# Patient Record
Sex: FEMALE | Race: WHITE | Employment: PART TIME | URBAN - METROPOLITAN AREA
[De-identification: names, ages, dates, MRNs, and addresses within clinical notes are randomized per-mention and may not be internally consistent; named-entity substitution may affect disease eponyms.]

---

## 2017-01-03 ENCOUNTER — HOSPITAL ENCOUNTER (OUTPATIENT)
Dept: OTHER | Age: 27
Discharge: HOME | End: 2017-01-03
Attending: NURSE PRACTITIONER | Admitting: NURSE PRACTITIONER

## 2017-01-03 ENCOUNTER — OFFICE VISIT (OUTPATIENT)
Dept: FAMILY MEDICINE CLINIC | Age: 27
End: 2017-01-03

## 2017-01-03 ENCOUNTER — TELEPHONE (OUTPATIENT)
Dept: CARDIOLOGY CLINIC | Age: 27
End: 2017-01-03

## 2017-01-03 VITALS
SYSTOLIC BLOOD PRESSURE: 132 MMHG | TEMPERATURE: 98.1 F | DIASTOLIC BLOOD PRESSURE: 94 MMHG | HEART RATE: 110 BPM | BODY MASS INDEX: 30.79 KG/M2 | WEIGHT: 173.8 LBS | HEIGHT: 63 IN | OXYGEN SATURATION: 97 % | RESPIRATION RATE: 14 BRPM

## 2017-01-03 DIAGNOSIS — F32.A DEPRESSION, UNSPECIFIED DEPRESSION TYPE: ICD-10-CM

## 2017-01-03 DIAGNOSIS — R07.9 CHEST PAIN, UNSPECIFIED TYPE: ICD-10-CM

## 2017-01-03 DIAGNOSIS — R00.0 TACHYCARDIA: ICD-10-CM

## 2017-01-03 DIAGNOSIS — M54.42 ACUTE MIDLINE LOW BACK PAIN WITH LEFT-SIDED SCIATICA: ICD-10-CM

## 2017-01-03 DIAGNOSIS — M54.42 ACUTE MIDLINE LOW BACK PAIN WITH LEFT-SIDED SCIATICA: Primary | ICD-10-CM

## 2017-01-03 DIAGNOSIS — R51.9 ACUTE NONINTRACTABLE HEADACHE, UNSPECIFIED HEADACHE TYPE: ICD-10-CM

## 2017-01-03 PROCEDURE — 93000 ELECTROCARDIOGRAM COMPLETE: CPT | Performed by: NURSE PRACTITIONER

## 2017-01-03 PROCEDURE — 99213 OFFICE O/P EST LOW 20 MIN: CPT | Performed by: NURSE PRACTITIONER

## 2017-01-03 RX ORDER — BUPROPION HYDROCHLORIDE 100 MG/1
100 TABLET ORAL 2 TIMES DAILY
Qty: 60 TABLET | Refills: 3 | Status: SHIPPED | OUTPATIENT
Start: 2017-01-03 | End: 2017-04-04 | Stop reason: ALTCHOICE

## 2017-01-03 RX ORDER — METHOCARBAMOL 500 MG/1
500 TABLET, FILM COATED ORAL 4 TIMES DAILY
Qty: 40 TABLET | Refills: 0 | Status: SHIPPED | OUTPATIENT
Start: 2017-01-03 | End: 2017-01-13

## 2017-01-03 ASSESSMENT — ENCOUNTER SYMPTOMS
GASTROINTESTINAL NEGATIVE: 1
EYES NEGATIVE: 1
BACK PAIN: 1

## 2017-01-04 ENCOUNTER — TELEPHONE (OUTPATIENT)
Dept: FAMILY MEDICINE CLINIC | Age: 27
End: 2017-01-04

## 2017-01-04 DIAGNOSIS — M54.42 ACUTE BILATERAL LOW BACK PAIN WITH BILATERAL SCIATICA: Primary | ICD-10-CM

## 2017-01-04 DIAGNOSIS — M54.41 ACUTE BILATERAL LOW BACK PAIN WITH BILATERAL SCIATICA: Primary | ICD-10-CM

## 2017-01-04 DIAGNOSIS — M54.5 ACUTE MIDLINE LOW BACK PAIN, WITH SCIATICA PRESENCE UNSPECIFIED: Primary | ICD-10-CM

## 2017-01-04 RX ORDER — HYDROCODONE BITARTRATE AND ACETAMINOPHEN 5; 325 MG/1; MG/1
1 TABLET ORAL EVERY 8 HOURS PRN
Qty: 15 TABLET | Refills: 0 | Status: SHIPPED | OUTPATIENT
Start: 2017-01-04 | End: 2017-01-11

## 2017-01-11 ENCOUNTER — OFFICE VISIT (OUTPATIENT)
Dept: CARDIOLOGY CLINIC | Age: 27
End: 2017-01-11

## 2017-01-11 VITALS
OXYGEN SATURATION: 98 % | HEIGHT: 63 IN | WEIGHT: 177 LBS | BODY MASS INDEX: 31.36 KG/M2 | HEART RATE: 90 BPM | SYSTOLIC BLOOD PRESSURE: 131 MMHG | DIASTOLIC BLOOD PRESSURE: 82 MMHG

## 2017-01-11 DIAGNOSIS — R55 SYNCOPE AND COLLAPSE: ICD-10-CM

## 2017-01-11 DIAGNOSIS — R00.0 SINUS TACHYCARDIA: Primary | ICD-10-CM

## 2017-01-11 PROCEDURE — 99214 OFFICE O/P EST MOD 30 MIN: CPT | Performed by: NURSE PRACTITIONER

## 2017-01-11 RX ORDER — PROPRANOLOL HYDROCHLORIDE 20 MG/1
20 TABLET ORAL 2 TIMES DAILY
Qty: 60 TABLET | Refills: 3 | Status: SHIPPED | OUTPATIENT
Start: 2017-01-11 | End: 2017-06-28 | Stop reason: SDUPTHER

## 2017-01-24 ENCOUNTER — TELEPHONE (OUTPATIENT)
Dept: CARDIOLOGY CLINIC | Age: 27
End: 2017-01-24

## 2017-01-25 DIAGNOSIS — R45.4 IRRITABILITY: ICD-10-CM

## 2017-01-25 DIAGNOSIS — F32.89 OTHER DEPRESSION: ICD-10-CM

## 2017-01-25 RX ORDER — ESCITALOPRAM OXALATE 10 MG/1
TABLET ORAL
Qty: 30 TABLET | Refills: 5 | Status: SHIPPED | OUTPATIENT
Start: 2017-01-25 | End: 2017-02-07 | Stop reason: ALTCHOICE

## 2017-02-07 ENCOUNTER — TELEPHONE (OUTPATIENT)
Dept: CARDIOLOGY CLINIC | Age: 27
End: 2017-02-07

## 2017-02-07 ENCOUNTER — OFFICE VISIT (OUTPATIENT)
Dept: CARDIOLOGY CLINIC | Age: 27
End: 2017-02-07

## 2017-02-07 VITALS
SYSTOLIC BLOOD PRESSURE: 102 MMHG | DIASTOLIC BLOOD PRESSURE: 66 MMHG | HEART RATE: 72 BPM | WEIGHT: 176 LBS | HEIGHT: 63 IN | BODY MASS INDEX: 31.18 KG/M2

## 2017-02-07 DIAGNOSIS — R00.2 PALPITATIONS: ICD-10-CM

## 2017-02-07 DIAGNOSIS — R55 SYNCOPE AND COLLAPSE: Primary | ICD-10-CM

## 2017-02-07 DIAGNOSIS — R00.0 SINUS TACHYCARDIA: ICD-10-CM

## 2017-02-07 PROCEDURE — 93000 ELECTROCARDIOGRAM COMPLETE: CPT | Performed by: NURSE PRACTITIONER

## 2017-02-07 PROCEDURE — 99214 OFFICE O/P EST MOD 30 MIN: CPT | Performed by: NURSE PRACTITIONER

## 2017-02-08 ENCOUNTER — OFFICE VISIT (OUTPATIENT)
Dept: FAMILY MEDICINE CLINIC | Age: 27
End: 2017-02-08

## 2017-02-08 VITALS
DIASTOLIC BLOOD PRESSURE: 68 MMHG | TEMPERATURE: 98.3 F | HEIGHT: 63 IN | RESPIRATION RATE: 16 BRPM | SYSTOLIC BLOOD PRESSURE: 112 MMHG | OXYGEN SATURATION: 98 % | WEIGHT: 179.2 LBS | HEART RATE: 98 BPM | BODY MASS INDEX: 31.75 KG/M2

## 2017-02-08 DIAGNOSIS — R73.09 BLOOD GLUCOSE LOWER THAN PRIOR MEASUREMENT: ICD-10-CM

## 2017-02-08 DIAGNOSIS — R55 NEAR SYNCOPE: Primary | ICD-10-CM

## 2017-02-08 LAB — GLUCOSE BLD-MCNC: 113 MG/DL

## 2017-02-08 PROCEDURE — 82962 GLUCOSE BLOOD TEST: CPT | Performed by: NURSE PRACTITIONER

## 2017-02-08 PROCEDURE — 99213 OFFICE O/P EST LOW 20 MIN: CPT | Performed by: NURSE PRACTITIONER

## 2017-02-08 RX ORDER — LANCETS 30 GAUGE
EACH MISCELLANEOUS
Qty: 100 EACH | Refills: 3 | Status: SHIPPED | OUTPATIENT
Start: 2017-02-08 | End: 2017-05-08

## 2017-02-08 RX ORDER — GLUCOSAMINE HCL/CHONDROITIN SU 500-400 MG
CAPSULE ORAL
Qty: 100 STRIP | Refills: 0 | Status: SHIPPED | OUTPATIENT
Start: 2017-02-08 | End: 2017-05-08

## 2017-02-08 ASSESSMENT — ENCOUNTER SYMPTOMS
EYES NEGATIVE: 1
RESPIRATORY NEGATIVE: 1
ABDOMINAL PAIN: 0
VISUAL CHANGE: 0
NAUSEA: 1

## 2017-02-10 ENCOUNTER — TELEPHONE (OUTPATIENT)
Dept: FAMILY MEDICINE CLINIC | Age: 27
End: 2017-02-10

## 2017-03-08 ENCOUNTER — OFFICE VISIT (OUTPATIENT)
Dept: FAMILY MEDICINE CLINIC | Age: 27
End: 2017-03-08

## 2017-03-08 VITALS
BODY MASS INDEX: 31.36 KG/M2 | OXYGEN SATURATION: 97 % | SYSTOLIC BLOOD PRESSURE: 128 MMHG | HEIGHT: 63 IN | TEMPERATURE: 98.1 F | HEART RATE: 80 BPM | DIASTOLIC BLOOD PRESSURE: 78 MMHG | WEIGHT: 177 LBS

## 2017-03-08 DIAGNOSIS — R10.33 PERIUMBILICAL ABDOMINAL PAIN: ICD-10-CM

## 2017-03-08 DIAGNOSIS — Z98.890 S/P ENDOMETRIAL ABLATION: Primary | ICD-10-CM

## 2017-03-08 PROCEDURE — 99213 OFFICE O/P EST LOW 20 MIN: CPT | Performed by: NURSE PRACTITIONER

## 2017-03-08 ASSESSMENT — ENCOUNTER SYMPTOMS
VOMITING: 0
EYES NEGATIVE: 1
DIARRHEA: 0
RESPIRATORY NEGATIVE: 1
CONSTIPATION: 0
NAUSEA: 0
ABDOMINAL PAIN: 1

## 2017-04-04 ENCOUNTER — OFFICE VISIT (OUTPATIENT)
Dept: FAMILY MEDICINE CLINIC | Age: 27
End: 2017-04-04

## 2017-04-04 VITALS
OXYGEN SATURATION: 97 % | DIASTOLIC BLOOD PRESSURE: 76 MMHG | SYSTOLIC BLOOD PRESSURE: 104 MMHG | BODY MASS INDEX: 31.18 KG/M2 | HEIGHT: 63 IN | WEIGHT: 176 LBS | HEART RATE: 113 BPM

## 2017-04-04 DIAGNOSIS — M25.562 ACUTE PAIN OF LEFT KNEE: Primary | ICD-10-CM

## 2017-04-04 DIAGNOSIS — R00.0 TACHYCARDIA: ICD-10-CM

## 2017-04-04 DIAGNOSIS — F32.89 OTHER DEPRESSION: ICD-10-CM

## 2017-04-04 PROCEDURE — 99214 OFFICE O/P EST MOD 30 MIN: CPT | Performed by: NURSE PRACTITIONER

## 2017-04-04 RX ORDER — BUPROPION HYDROCHLORIDE 150 MG/1
150 TABLET, EXTENDED RELEASE ORAL 2 TIMES DAILY
Qty: 60 TABLET | Refills: 3 | Status: SHIPPED | OUTPATIENT
Start: 2017-04-04 | End: 2017-12-11 | Stop reason: SDUPTHER

## 2017-04-04 ASSESSMENT — ENCOUNTER SYMPTOMS
DIARRHEA: 0
CONSTIPATION: 0
VOMITING: 0
WHEEZING: 0
BLURRED VISION: 0
NAUSEA: 0
COUGH: 0

## 2017-04-27 ENCOUNTER — TELEPHONE (OUTPATIENT)
Dept: CARDIOLOGY CLINIC | Age: 27
End: 2017-04-27

## 2017-04-28 ENCOUNTER — OFFICE VISIT (OUTPATIENT)
Dept: CARDIOLOGY CLINIC | Age: 27
End: 2017-04-28

## 2017-04-28 VITALS
WEIGHT: 176 LBS | BODY MASS INDEX: 31.18 KG/M2 | HEIGHT: 63 IN | DIASTOLIC BLOOD PRESSURE: 94 MMHG | HEART RATE: 79 BPM | SYSTOLIC BLOOD PRESSURE: 120 MMHG

## 2017-04-28 DIAGNOSIS — R00.2 PALPITATIONS: Primary | ICD-10-CM

## 2017-04-28 DIAGNOSIS — R41.3 MEMORY LOSS: ICD-10-CM

## 2017-04-28 DIAGNOSIS — R55 SYNCOPE AND COLLAPSE: ICD-10-CM

## 2017-04-28 PROCEDURE — 93000 ELECTROCARDIOGRAM COMPLETE: CPT | Performed by: INTERNAL MEDICINE

## 2017-04-28 PROCEDURE — 99214 OFFICE O/P EST MOD 30 MIN: CPT | Performed by: INTERNAL MEDICINE

## 2017-05-01 ENCOUNTER — TELEPHONE (OUTPATIENT)
Dept: CARDIOLOGY CLINIC | Age: 27
End: 2017-05-01

## 2017-05-02 ENCOUNTER — OFFICE VISIT (OUTPATIENT)
Dept: FAMILY MEDICINE CLINIC | Age: 27
End: 2017-05-02

## 2017-05-02 VITALS
SYSTOLIC BLOOD PRESSURE: 110 MMHG | WEIGHT: 176.6 LBS | DIASTOLIC BLOOD PRESSURE: 64 MMHG | HEART RATE: 102 BPM | HEIGHT: 63 IN | OXYGEN SATURATION: 98 % | BODY MASS INDEX: 31.29 KG/M2

## 2017-05-02 DIAGNOSIS — R00.2 PALPITATIONS: ICD-10-CM

## 2017-05-02 DIAGNOSIS — G89.29 CHRONIC PAIN OF BOTH KNEES: ICD-10-CM

## 2017-05-02 DIAGNOSIS — M25.562 CHRONIC PAIN OF BOTH KNEES: ICD-10-CM

## 2017-05-02 DIAGNOSIS — M25.561 CHRONIC PAIN OF BOTH KNEES: ICD-10-CM

## 2017-05-02 DIAGNOSIS — R55 SYNCOPE, UNSPECIFIED SYNCOPE TYPE: Primary | ICD-10-CM

## 2017-05-02 PROCEDURE — 99214 OFFICE O/P EST MOD 30 MIN: CPT | Performed by: NURSE PRACTITIONER

## 2017-05-02 RX ORDER — HYDROCODONE BITARTRATE AND ACETAMINOPHEN 5; 325 MG/1; MG/1
1 TABLET ORAL EVERY 8 HOURS PRN
Qty: 21 TABLET | Refills: 0 | Status: SHIPPED | OUTPATIENT
Start: 2017-05-02 | End: 2017-05-08

## 2017-05-02 ASSESSMENT — ENCOUNTER SYMPTOMS
WHEEZING: 0
CONSTIPATION: 0
DIARRHEA: 0
NAUSEA: 0
BLURRED VISION: 0
COUGH: 0
VOMITING: 0
BOWEL INCONTINENCE: 0

## 2017-05-08 ENCOUNTER — INITIAL CONSULT (OUTPATIENT)
Dept: NEUROLOGY | Age: 27
End: 2017-05-08

## 2017-05-08 VITALS
DIASTOLIC BLOOD PRESSURE: 73 MMHG | HEIGHT: 63 IN | SYSTOLIC BLOOD PRESSURE: 118 MMHG | WEIGHT: 177 LBS | BODY MASS INDEX: 31.36 KG/M2 | HEART RATE: 94 BPM | OXYGEN SATURATION: 95 %

## 2017-05-08 DIAGNOSIS — R55 SYNCOPE AND COLLAPSE: Primary | ICD-10-CM

## 2017-05-08 DIAGNOSIS — R56.9 NEW ONSET SEIZURE (HCC): ICD-10-CM

## 2017-05-08 DIAGNOSIS — R41.3 MEMORY LOSS: ICD-10-CM

## 2017-05-08 DIAGNOSIS — F51.01 PRIMARY INSOMNIA: ICD-10-CM

## 2017-05-08 PROCEDURE — 99204 OFFICE O/P NEW MOD 45 MIN: CPT | Performed by: PSYCHIATRY & NEUROLOGY

## 2017-05-09 ENCOUNTER — OFFICE VISIT (OUTPATIENT)
Dept: ORTHOPEDIC SURGERY | Age: 27
End: 2017-05-09

## 2017-05-09 VITALS — BODY MASS INDEX: 31.37 KG/M2 | HEIGHT: 63 IN | WEIGHT: 177.03 LBS

## 2017-05-09 DIAGNOSIS — M22.2X1 PATELLOFEMORAL STRESS SYNDROME OF BOTH KNEES: Primary | ICD-10-CM

## 2017-05-09 DIAGNOSIS — M25.562 LEFT KNEE PAIN, UNSPECIFIED CHRONICITY: ICD-10-CM

## 2017-05-09 DIAGNOSIS — M25.561 RIGHT KNEE PAIN, UNSPECIFIED CHRONICITY: ICD-10-CM

## 2017-05-09 DIAGNOSIS — M22.2X2 PATELLOFEMORAL STRESS SYNDROME OF BOTH KNEES: Primary | ICD-10-CM

## 2017-05-09 PROCEDURE — 99203 OFFICE O/P NEW LOW 30 MIN: CPT | Performed by: ORTHOPAEDIC SURGERY

## 2017-05-09 PROCEDURE — 73564 X-RAY EXAM KNEE 4 OR MORE: CPT | Performed by: ORTHOPAEDIC SURGERY

## 2017-05-11 ENCOUNTER — HOSPITAL ENCOUNTER (OUTPATIENT)
Dept: MRI IMAGING | Age: 27
Discharge: HOME | End: 2017-05-11
Attending: PSYCHIATRY & NEUROLOGY | Admitting: PSYCHIATRY & NEUROLOGY

## 2017-05-11 DIAGNOSIS — R55 SYNCOPE AND COLLAPSE: ICD-10-CM

## 2017-05-11 PROCEDURE — 95816 EEG AWAKE AND DROWSY: CPT | Performed by: PSYCHIATRY & NEUROLOGY

## 2017-05-15 ENCOUNTER — TELEPHONE (OUTPATIENT)
Dept: NEUROLOGY | Age: 27
End: 2017-05-15

## 2017-05-24 DIAGNOSIS — Z45.09 ENCOUNTER FOR LOOP RECORDER CHECK: ICD-10-CM

## 2017-05-24 DIAGNOSIS — Z45.09 ENCOUNTER FOR ELECTRONIC ANALYSIS OF REVEAL EVENT RECORDER: Primary | ICD-10-CM

## 2017-05-30 ENCOUNTER — PROCEDURE VISIT (OUTPATIENT)
Dept: CARDIOLOGY CLINIC | Age: 27
End: 2017-05-30

## 2017-05-30 DIAGNOSIS — Z45.09 ENCOUNTER FOR ELECTRONIC ANALYSIS OF REVEAL EVENT RECORDER: Primary | ICD-10-CM

## 2017-06-12 ENCOUNTER — PROCEDURE VISIT (OUTPATIENT)
Dept: CARDIOLOGY CLINIC | Age: 27
End: 2017-06-12

## 2017-06-12 DIAGNOSIS — Z45.09 ENCOUNTER FOR ELECTRONIC ANALYSIS OF REVEAL EVENT RECORDER: Primary | ICD-10-CM

## 2017-06-12 DIAGNOSIS — R55 SYNCOPE AND COLLAPSE: ICD-10-CM

## 2017-06-12 DIAGNOSIS — R00.0 SINUS TACHYCARDIA: ICD-10-CM

## 2017-06-12 PROCEDURE — 93298 REM INTERROG DEV EVAL SCRMS: CPT | Performed by: INTERNAL MEDICINE

## 2017-06-12 PROCEDURE — 93299 PR REM INTERROG ICPMS/SCRMS <30 D TECH REVIEW: CPT | Performed by: INTERNAL MEDICINE

## 2017-06-14 ENCOUNTER — TELEPHONE (OUTPATIENT)
Dept: CARDIOLOGY CLINIC | Age: 27
End: 2017-06-14

## 2017-06-20 ENCOUNTER — TELEPHONE (OUTPATIENT)
Dept: CARDIOLOGY CLINIC | Age: 27
End: 2017-06-20

## 2017-06-28 ENCOUNTER — NURSE ONLY (OUTPATIENT)
Dept: CARDIOLOGY CLINIC | Age: 27
End: 2017-06-28

## 2017-06-28 ENCOUNTER — TELEPHONE (OUTPATIENT)
Dept: CARDIOLOGY CLINIC | Age: 27
End: 2017-06-28

## 2017-06-28 DIAGNOSIS — Z45.09 ENCOUNTER FOR ELECTRONIC ANALYSIS OF REVEAL EVENT RECORDER: Primary | ICD-10-CM

## 2017-06-29 RX ORDER — PROPRANOLOL HYDROCHLORIDE 20 MG/1
20 TABLET ORAL DAILY
Qty: 60 TABLET | Refills: 2
Start: 2017-06-29 | End: 2017-07-10 | Stop reason: ALTCHOICE

## 2017-06-30 ENCOUNTER — TELEPHONE (OUTPATIENT)
Dept: CARDIOLOGY CLINIC | Age: 27
End: 2017-06-30

## 2017-07-10 ENCOUNTER — PROCEDURE VISIT (OUTPATIENT)
Dept: CARDIOLOGY CLINIC | Age: 27
End: 2017-07-10

## 2017-07-10 ENCOUNTER — TELEPHONE (OUTPATIENT)
Dept: CARDIOLOGY CLINIC | Age: 27
End: 2017-07-10

## 2017-07-10 DIAGNOSIS — R00.0 SINUS TACHYCARDIA: ICD-10-CM

## 2017-07-10 DIAGNOSIS — R55 SYNCOPE AND COLLAPSE: ICD-10-CM

## 2017-07-10 DIAGNOSIS — Z45.09 ENCOUNTER FOR ELECTRONIC ANALYSIS OF REVEAL EVENT RECORDER: Primary | ICD-10-CM

## 2017-07-10 PROCEDURE — 93291 INTERROG DEV EVAL SCRMS IP: CPT | Performed by: INTERNAL MEDICINE

## 2017-07-10 RX ORDER — METOPROLOL SUCCINATE 25 MG/1
25 TABLET, EXTENDED RELEASE ORAL DAILY
Qty: 90 TABLET | Refills: 3 | Status: SHIPPED | OUTPATIENT
Start: 2017-07-10 | End: 2017-09-18 | Stop reason: SDUPTHER

## 2017-08-06 PROBLEM — R10.2 ADNEXAL PAIN: Status: ACTIVE | Noted: 2017-01-23

## 2017-08-06 RX ORDER — HYDROCODONE BITARTRATE AND ACETAMINOPHEN 5; 325 MG/1; MG/1
1 TABLET ORAL EVERY 6 HOURS PRN
COMMUNITY
Start: 2017-07-26 | End: 2017-12-07 | Stop reason: ALTCHOICE

## 2017-08-06 RX ORDER — ONDANSETRON 4 MG/1
4 TABLET, FILM COATED ORAL EVERY 8 HOURS PRN
COMMUNITY
Start: 2017-07-26 | End: 2017-12-07 | Stop reason: ALTCHOICE

## 2017-08-06 RX ORDER — IBUPROFEN 800 MG/1
800 TABLET ORAL EVERY 6 HOURS PRN
COMMUNITY
Start: 2017-07-14 | End: 2018-03-31 | Stop reason: ALTCHOICE

## 2017-08-08 ENCOUNTER — PROCEDURE VISIT (OUTPATIENT)
Dept: CARDIOLOGY CLINIC | Age: 27
End: 2017-08-08

## 2017-08-08 DIAGNOSIS — Z45.09 ENCOUNTER FOR ELECTRONIC ANALYSIS OF REVEAL EVENT RECORDER: Primary | ICD-10-CM

## 2017-08-15 ENCOUNTER — NURSE ONLY (OUTPATIENT)
Dept: CARDIOLOGY CLINIC | Age: 27
End: 2017-08-15

## 2017-08-15 DIAGNOSIS — Z45.09 ENCOUNTER FOR ELECTRONIC ANALYSIS OF REVEAL EVENT RECORDER: Primary | ICD-10-CM

## 2017-08-15 DIAGNOSIS — R00.0 SINUS TACHYCARDIA: ICD-10-CM

## 2017-08-15 PROCEDURE — 93299 PR REM INTERROG ICPMS/SCRMS <30 D TECH REVIEW: CPT | Performed by: INTERNAL MEDICINE

## 2017-08-15 PROCEDURE — 93298 REM INTERROG DEV EVAL SCRMS: CPT | Performed by: INTERNAL MEDICINE

## 2017-09-15 ENCOUNTER — PROCEDURE VISIT (OUTPATIENT)
Dept: CARDIOLOGY CLINIC | Age: 27
End: 2017-09-15

## 2017-09-15 DIAGNOSIS — Z45.09 ENCOUNTER FOR ELECTRONIC ANALYSIS OF REVEAL EVENT RECORDER: Primary | ICD-10-CM

## 2017-09-15 DIAGNOSIS — R55 SYNCOPE AND COLLAPSE: ICD-10-CM

## 2017-09-15 PROCEDURE — 93299 PR REM INTERROG ICPMS/SCRMS <30 D TECH REVIEW: CPT | Performed by: INTERNAL MEDICINE

## 2017-09-15 PROCEDURE — 93298 REM INTERROG DEV EVAL SCRMS: CPT | Performed by: INTERNAL MEDICINE

## 2017-09-19 RX ORDER — METOPROLOL SUCCINATE 50 MG/1
50 TABLET, EXTENDED RELEASE ORAL DAILY
Qty: 90 TABLET | Refills: 1 | Status: SHIPPED | OUTPATIENT
Start: 2017-09-19 | End: 2017-11-02 | Stop reason: SDUPTHER

## 2017-09-28 ENCOUNTER — OFFICE VISIT (OUTPATIENT)
Dept: FAMILY MEDICINE CLINIC | Age: 27
End: 2017-09-28

## 2017-09-28 ENCOUNTER — HOSPITAL ENCOUNTER (OUTPATIENT)
Dept: OTHER | Age: 27
Discharge: HOME | End: 2017-09-28
Attending: NURSE PRACTITIONER | Admitting: NURSE PRACTITIONER

## 2017-09-28 VITALS
BODY MASS INDEX: 30.82 KG/M2 | RESPIRATION RATE: 18 BRPM | SYSTOLIC BLOOD PRESSURE: 112 MMHG | OXYGEN SATURATION: 98 % | WEIGHT: 174 LBS | DIASTOLIC BLOOD PRESSURE: 70 MMHG | HEART RATE: 85 BPM

## 2017-09-28 DIAGNOSIS — M25.511 CHRONIC PAIN OF BOTH SHOULDERS: ICD-10-CM

## 2017-09-28 DIAGNOSIS — M25.551 RIGHT HIP PAIN: ICD-10-CM

## 2017-09-28 DIAGNOSIS — M25.512 CHRONIC PAIN OF BOTH SHOULDERS: ICD-10-CM

## 2017-09-28 DIAGNOSIS — G89.29 CHRONIC PAIN OF BOTH SHOULDERS: ICD-10-CM

## 2017-09-28 DIAGNOSIS — G56.03 BILATERAL CARPAL TUNNEL SYNDROME: ICD-10-CM

## 2017-09-28 DIAGNOSIS — R53.83 FATIGUE, UNSPECIFIED TYPE: ICD-10-CM

## 2017-09-28 DIAGNOSIS — R12 HEARTBURN: Primary | ICD-10-CM

## 2017-09-28 LAB
BASOPHILS ABSOLUTE: 0 K/UL (ref 0–0.2)
BASOPHILS RELATIVE PERCENT: 0.8 %
EOSINOPHILS ABSOLUTE: 0.1 K/UL (ref 0–0.6)
EOSINOPHILS RELATIVE PERCENT: 1.4 %
HCT VFR BLD CALC: 40.9 % (ref 36–48)
HEMOGLOBIN: 13.8 G/DL (ref 12–16)
LYMPHOCYTES ABSOLUTE: 1.7 K/UL (ref 1–5.1)
LYMPHOCYTES RELATIVE PERCENT: 31.5 %
MCH RBC QN AUTO: 32.4 PG (ref 26–34)
MCHC RBC AUTO-ENTMCNC: 33.7 G/DL (ref 31–36)
MCV RBC AUTO: 96 FL (ref 80–100)
MONOCYTES ABSOLUTE: 0.3 K/UL (ref 0–1.3)
MONOCYTES RELATIVE PERCENT: 5.8 %
NEUTROPHILS ABSOLUTE: 3.2 K/UL (ref 1.7–7.7)
NEUTROPHILS RELATIVE PERCENT: 60.5 %
PDW BLD-RTO: 12.8 % (ref 12.4–15.4)
PLATELET # BLD: 243 K/UL (ref 135–450)
PMV BLD AUTO: 9.6 FL (ref 5–10.5)
RBC # BLD: 4.26 M/UL (ref 4–5.2)
WBC # BLD: 5.3 K/UL (ref 4–11)

## 2017-09-28 PROCEDURE — 36415 COLL VENOUS BLD VENIPUNCTURE: CPT | Performed by: NURSE PRACTITIONER

## 2017-09-28 PROCEDURE — 99214 OFFICE O/P EST MOD 30 MIN: CPT | Performed by: NURSE PRACTITIONER

## 2017-09-28 RX ORDER — PREDNISONE 10 MG/1
TABLET ORAL
Qty: 30 TABLET | Refills: 0 | Status: SHIPPED | OUTPATIENT
Start: 2017-09-28 | End: 2017-09-28 | Stop reason: CLARIF

## 2017-09-28 RX ORDER — PANTOPRAZOLE SODIUM 40 MG/1
40 TABLET, DELAYED RELEASE ORAL DAILY
Qty: 30 TABLET | Refills: 3 | Status: SHIPPED | OUTPATIENT
Start: 2017-09-28 | End: 2017-09-28 | Stop reason: CLARIF

## 2017-09-28 RX ORDER — PREDNISONE 10 MG/1
TABLET ORAL
Qty: 30 TABLET | Refills: 0 | Status: SHIPPED | OUTPATIENT
Start: 2017-09-28 | End: 2017-12-07 | Stop reason: ALTCHOICE

## 2017-09-28 RX ORDER — PANTOPRAZOLE SODIUM 40 MG/1
40 TABLET, DELAYED RELEASE ORAL DAILY
Qty: 30 TABLET | Refills: 3 | Status: SHIPPED | OUTPATIENT
Start: 2017-09-28 | End: 2017-12-07 | Stop reason: ALTCHOICE

## 2017-09-28 RX ORDER — METHOCARBAMOL 500 MG/1
500 TABLET, FILM COATED ORAL 3 TIMES DAILY PRN
Qty: 30 TABLET | Refills: 0 | Status: SHIPPED | OUTPATIENT
Start: 2017-09-28 | End: 2017-12-07 | Stop reason: ALTCHOICE

## 2017-09-28 RX ORDER — METHOCARBAMOL 500 MG/1
500 TABLET, FILM COATED ORAL 4 TIMES DAILY PRN
Qty: 40 TABLET | Refills: 0 | Status: SHIPPED | OUTPATIENT
Start: 2017-09-28 | End: 2017-09-28 | Stop reason: CLARIF

## 2017-09-28 NOTE — PATIENT INSTRUCTIONS
are not able to stand or walk or bear weight. · Your buttocks, legs, or feet feel numb or tingly. · Your leg or foot is cool or pale or changes color. · You have severe pain. Call your doctor now or seek immediate medical care if:  · You have signs of infection, such as:  ¨ Increased pain, swelling, warmth, or redness in the hip area. ¨ Red streaks leading from the hip area. ¨ Pus draining from the hip area. ¨ A fever. · You have signs of a blood clot, such as:  ¨ Pain in your calf, back of the knee, thigh, or groin. ¨ Redness and swelling in your leg or groin. · You are not able to bend, straighten, or move your leg normally. · You have trouble urinating or having bowel movements. Watch closely for changes in your health, and be sure to contact your doctor if:  · You do not get better as expected. Where can you learn more? Go to https://HearToday.Org.Nitro PDF. org and sign in to your CitalDoc account. Enter U117 in the SozializeMe box to learn more about \"Hip Pain: Care Instructions. \"     If you do not have an account, please click on the \"Sign Up Now\" link. Current as of: March 20, 2017  Content Version: 11.3  © 0493-0707 [employer]. Care instructions adapted under license by [facility] ([facility]). If you have questions about a medical condition or this instruction, always ask your healthcare professional. [place] [address] any warranty or liability for your use of this information. Plantar Fasciitis: Care Instructions  Your Care Instructions    Plantar fasciitis is pain and inflammation of the plantar fascia, the tissue at the bottom of your foot that connects the heel bone to the toes. The plantar fascia also supports the arch. If you strain the plantar fascia, it can develop small tears and cause heel pain when you stand or walk. Plantar fasciitis can be caused by running or other sports.  It also may occur in people who are overweight or who have high arches or flat feet. You may get plantar fasciitis if you walk or stand for long periods, or have a tight Achilles tendon or calf muscles. You can improve your foot pain with rest and other care at home. It might take a few weeks to a few months for your foot to heal completely. Follow-up care is a key part of your treatment and safety. Be sure to make and go to all appointments, and call your doctor if you are having problems. It's also a good idea to know your test results and keep a list of the medicines you take. How can you care for yourself at home? · Rest your feet often. Reduce your activity to a level that lets you avoid pain. If possible, do not run or walk on hard surfaces. · Take pain medicines exactly as directed. ¨ If the doctor gave you a prescription medicine for pain, take it as prescribed. ¨ If you are not taking a prescription pain medicine, take an over-the-counter anti-inflammatory medicine for pain and swelling, such as ibuprofen (Advil, Motrin) or naproxen (Aleve). Read and follow all instructions on the label. · Use ice massage to help with pain and swelling. You can use an ice cube or an ice cup several times a day. To make an ice cup, fill a paper cup with water and freeze it. Cut off the top of the cup until a half-inch of ice shows. Hold onto the remaining paper to use the cup. Rub the ice in small circles over the area for 5 to 7 minutes. · Contrast baths, which alternate hot and cold water, can also help reduce swelling. But because heat alone may make pain and swelling worse, end a contrast bath with a soak in cold water. · Wear a night splint if your doctor suggests it. A night splint holds your foot with the toes pointed up and the foot and ankle at a 90-degree angle. This position gives the bottom of your foot a constant, gentle stretch. · Do simple exercises such as calf stretches and towel stretches 2 to 3 times each day, especially when you first get up in the morning. These can help the plantar fascia become more flexible. They also make the muscles that support your arch stronger. Hold these stretches for 15 to 30 seconds per stretch. Repeat 2 to 4 times. ¨ Stand about 1 foot from a wall. Place the palms of both hands against the wall at chest level. Lean forward against the wall, keeping one leg with the knee straight and heel on the ground while bending the knee of the other leg. ¨ Sit down on the floor or a mat with your feet stretched in front of you. Roll up a towel lengthwise, and loop it over the ball of your foot. Holding the towel at both ends, gently pull the towel toward you to stretch your foot. · Wear shoes with good arch support. Athletic shoes or shoes with a well-cushioned sole are good choices. · Try heel cups or shoe inserts (orthotics) to help cushion your heel. You can buy these at many shoe stores. · Put on your shoes as soon as you get out of bed. Going barefoot or wearing slippers may make your pain worse. · Reach and stay at a good weight for your height. This puts less strain on your feet. When should you call for help? Call your doctor now or seek immediate medical care if:  · You have heel pain with fever, redness, or warmth in your heel. · You cannot put weight on the sore foot. Watch closely for changes in your health, and be sure to contact your doctor if:  · You have numbness or tingling in your heel. · Your heel pain lasts more than 2 weeks. Where can you learn more? Go to https://Lighting Science GroupyovanyAddy.Uni-Power Group. org and sign in to your Kenguru account. Enter X584 in the Scripted box to learn more about \"Plantar Fasciitis: Care Instructions. \"     If you do not have an account, please click on the \"Sign Up Now\" link. Current as of: March 21, 2017  Content Version: 11.3  © 5150-5898 [employer]. Care instructions adapted under license by [facility] ([facility]).  If you have questions about a medical condition or this

## 2017-09-29 LAB
A/G RATIO: 1.6 (ref 1.1–2.2)
ALBUMIN SERPL-MCNC: 4.4 G/DL (ref 3.4–5)
ALP BLD-CCNC: 59 U/L (ref 40–129)
ALT SERPL-CCNC: 21 U/L (ref 10–40)
ANION GAP SERPL CALCULATED.3IONS-SCNC: 13 MMOL/L (ref 3–16)
AST SERPL-CCNC: 19 U/L (ref 15–37)
BILIRUB SERPL-MCNC: 0.4 MG/DL (ref 0–1)
BUN BLDV-MCNC: 12 MG/DL (ref 7–20)
CALCIUM SERPL-MCNC: 9.6 MG/DL (ref 8.3–10.6)
CHLORIDE BLD-SCNC: 101 MMOL/L (ref 99–110)
CO2: 26 MMOL/L (ref 21–32)
CREAT SERPL-MCNC: 0.6 MG/DL (ref 0.6–1.1)
FOLATE: >20 NG/ML (ref 4.78–24.2)
GFR AFRICAN AMERICAN: >60
GFR NON-AFRICAN AMERICAN: >60
GLOBULIN: 2.7 G/DL
GLUCOSE BLD-MCNC: 100 MG/DL (ref 70–99)
MAGNESIUM: 1.9 MG/DL (ref 1.8–2.4)
POTASSIUM SERPL-SCNC: 4.2 MMOL/L (ref 3.5–5.1)
RHEUMATOID FACTOR: <10 IU/ML
SEDIMENTATION RATE, ERYTHROCYTE: 10 MM/HR (ref 0–20)
SODIUM BLD-SCNC: 140 MMOL/L (ref 136–145)
T4 FREE: 1 NG/DL (ref 0.9–1.8)
TOTAL PROTEIN: 7.1 G/DL (ref 6.4–8.2)
TSH SERPL DL<=0.05 MIU/L-ACNC: 3.02 UIU/ML (ref 0.27–4.2)
VITAMIN B-12: 356 PG/ML (ref 211–911)
VITAMIN D 25-HYDROXY: 30.9 NG/ML

## 2017-10-01 ASSESSMENT — ENCOUNTER SYMPTOMS
ABDOMINAL PAIN: 0
ABDOMINAL DISTENTION: 0
COUGH: 0
CHEST TIGHTNESS: 0
BLOOD IN STOOL: 0
NAUSEA: 0
RECTAL PAIN: 0
WHEEZING: 0
VOMITING: 0
DIARRHEA: 0
SHORTNESS OF BREATH: 0
BACK PAIN: 0
ANAL BLEEDING: 0
CONSTIPATION: 0

## 2017-10-02 ENCOUNTER — TELEPHONE (OUTPATIENT)
Dept: FAMILY MEDICINE CLINIC | Age: 27
End: 2017-10-02

## 2017-10-05 ENCOUNTER — TELEPHONE (OUTPATIENT)
Dept: FAMILY MEDICINE CLINIC | Age: 27
End: 2017-10-05

## 2017-10-05 NOTE — TELEPHONE ENCOUNTER
[phone] (home)  I spoke to the pt at this time and made her aware we do not provide pain medication and she states \" yeah thanks\" The pt instructed to follow up with ortho as directed while in the office and she states she does have an appointment on 10/10/17 with eliel.

## 2017-10-10 ENCOUNTER — OFFICE VISIT (OUTPATIENT)
Dept: ORTHOPEDIC SURGERY | Age: 27
End: 2017-10-10

## 2017-10-10 VITALS
WEIGHT: 173.94 LBS | BODY MASS INDEX: 30.82 KG/M2 | HEIGHT: 63 IN | HEART RATE: 68 BPM | SYSTOLIC BLOOD PRESSURE: 121 MMHG | DIASTOLIC BLOOD PRESSURE: 89 MMHG

## 2017-10-10 DIAGNOSIS — M25.552 PAIN OF LEFT HIP JOINT: Primary | ICD-10-CM

## 2017-10-10 PROBLEM — Z90.710 H/O: HYSTERECTOMY: Status: ACTIVE | Noted: 2017-08-21

## 2017-10-10 PROCEDURE — 99214 OFFICE O/P EST MOD 30 MIN: CPT | Performed by: ORTHOPAEDIC SURGERY

## 2017-10-10 NOTE — PROGRESS NOTES
Spouse name: N/A    Number of children: N/A    Years of education: N/A     Occupational History    Not on file. Social History Main Topics    Smoking status: Never Smoker    Smokeless tobacco: Never Used    Alcohol use No    Drug use: No    Sexual activity: Yes     Partners: Male     Other Topics Concern    Not on file     Social History Narrative    No narrative on file       Family History   Problem Relation Age of Onset    Cancer Maternal Grandmother      Thyroid    Cancer Paternal Grandmother     Arthritis Mother     Mental Retardation Maternal Uncle         Review of Systems  Pertinent items are noted in HPI  Review of systems reviewed from Patient History Form dated on 10/10/17 and available in the patient's chart under the Media tab. Vital Signs  Vitals:    10/10/17 1407   BP: 121/89   Pulse: 68       General Exam:   Constitutional: Patient is adequately groomed with no evidence of malnutrition  Mental Status: The patient is oriented to time, place and person. The patient's mood and affect are appropriate. Lymphatic: The lymphatic examination bilaterally reveals all areas to be without enlargement or induration. Neurological: The patient has good coordination. There is no weakness or sensory deficit.      Gait: Mildly antalgic    Left Hip Examination  Inspection:  No skin injury or bruising    Palpation:  Moderate tenderness around IT band    Range of Motion:  Flexion 120°, internal rotation at 90° to 30°, external rotation at 90° to 45°, symmetric range of motion    Sensation: In tact to light touch all nerve distributions     Strength:  5 out of 5 dorsiflexion, plantar flexion, EHL, knee flexion, knee extension, hip flexion    Special Tests:  Log Roll Pain  negative  JOSE C Pain:    Positive  FADIR Pain:    Positive  Pain with extension positive  Lolly's test  Positive  Negative straight leg test or radicular symptoms down the left leg    Skin: There are no additional worrisome rashes, ulcerations or lesions. Circulation normal    Additional Examinations:  Right Lower Extremity: Examination of the right lower extremity does not show any tenderness, deformity or injury. Range of motion is unremarkable. There is no gross instability. There are no rashes, ulcerations or lesions. Strength and tone are normal.        Radiology:     X-rays obtained and reviewed in office:  AP pelvis and lateral of left hip obtained on 9/28/17 demonstrate no major degenerative changes. She does not have evidence of cam or pincer deformities to the left hip joint. No acute fractures, or other injuries      Assessment:  43-year-old female with left hip pain, IT band syndrome and iliopsoas tendinitis    Office Procedures:  No orders of the defined types were placed in this encounter. Plan:   1. We will start with nonsurgical treatment at this time. We discussed that she does. Have some pain that is from her IT band but also may have some pain as radiating from her low back. She appears to have some iliopsoas pain as well as a snapping feeling in the anterior aspect of her hip. We'll start with physical therapy to address these things. 2.  Discussed that if she continues to have symptoms that suggest either a intra-articular issue for her left hip or iliopsoas tendon may have her see another physician who addresses acing surgically, but we will follow and see if she does nonsurgically first.  3.  She can continue to take the medications prescribed by her primary care doctor including ibuprofen. 4.  I'll see her back in 6 weeks and if there are any issues the interim she should contact the office      Voice Recognition Dictation disclaimer: Please note that portions of this chart were generated using Dragon dictation software. Although every effort was made to ensure the accuracy of this automated transcription, some errors in transcription may have occurred.

## 2017-10-17 ENCOUNTER — NURSE ONLY (OUTPATIENT)
Dept: CARDIOLOGY CLINIC | Age: 27
End: 2017-10-17

## 2017-10-17 DIAGNOSIS — Z45.09 ENCOUNTER FOR ELECTRONIC ANALYSIS OF REVEAL EVENT RECORDER: Primary | ICD-10-CM

## 2017-10-17 DIAGNOSIS — I49.5 SINOATRIAL NODE DYSFUNCTION (HCC): ICD-10-CM

## 2017-10-17 DIAGNOSIS — R00.0 SINUS TACHYCARDIA: ICD-10-CM

## 2017-10-17 DIAGNOSIS — R55 SYNCOPE AND COLLAPSE: ICD-10-CM

## 2017-10-17 PROCEDURE — 93298 REM INTERROG DEV EVAL SCRMS: CPT | Performed by: INTERNAL MEDICINE

## 2017-10-17 PROCEDURE — 93299 PR REM INTERROG ICPMS/SCRMS <30 D TECH REVIEW: CPT | Performed by: INTERNAL MEDICINE

## 2017-10-23 ENCOUNTER — TELEPHONE (OUTPATIENT)
Dept: ORTHOPEDIC SURGERY | Age: 27
End: 2017-10-23

## 2017-10-23 NOTE — TELEPHONE ENCOUNTER
Patient called stating her hip pain is increasing and that she has been up for the past two days crying with pain. Patient states her first therapy appointment is scheduled for 10/31/17. Per Dr. Cat Mcdonald, I advised patient to see if she could move up her therapy visit and she states she is unable to due to her  has her car. I advised trying heat, she states it causes more pressure.  Patient states \"nothing you are recommending is going to help\" I apologized to patient and advised once she is able to start PT they will be able to do heat and ultrasound to help get her symptoms under control, patient voiced understanding.---DMD

## 2017-10-24 NOTE — PROGRESS NOTES
See PACEART report under Cardiology tab. Carelink/loop recorder transmission. 2 new tachy episodes recorded-ST/SVT-< 1 min. No symptom episodes recorded. She is on Toprol XL 50 mg daily. Follow up in 1 month via carelink.

## 2017-10-30 ENCOUNTER — TELEPHONE (OUTPATIENT)
Dept: CARDIOLOGY CLINIC | Age: 27
End: 2017-10-30

## 2017-10-30 NOTE — TELEPHONE ENCOUNTER
I left a message on her voicemail. I gave her the number to call [employer] to receive another symptom recorder.  I spoke with Aviva Laws who said she had a transmission sent this morning that was normal.

## 2017-10-31 ENCOUNTER — HOSPITAL ENCOUNTER (OUTPATIENT)
Dept: PHYSICAL THERAPY | Age: 27
Discharge: HOME | End: 2017-10-31
Admitting: ORTHOPAEDIC SURGERY

## 2017-11-01 ENCOUNTER — HOSPITAL ENCOUNTER (OUTPATIENT)
Dept: PHYSICAL THERAPY | Age: 27
Discharge: HOME | End: 2017-11-30
Attending: ORTHOPAEDIC SURGERY | Admitting: ORTHOPAEDIC SURGERY

## 2017-11-02 ENCOUNTER — OFFICE VISIT (OUTPATIENT)
Dept: CARDIOLOGY CLINIC | Age: 27
End: 2017-11-02

## 2017-11-02 ENCOUNTER — PROCEDURE VISIT (OUTPATIENT)
Dept: CARDIOLOGY CLINIC | Age: 27
End: 2017-11-02

## 2017-11-02 VITALS
WEIGHT: 174 LBS | SYSTOLIC BLOOD PRESSURE: 96 MMHG | DIASTOLIC BLOOD PRESSURE: 64 MMHG | HEIGHT: 63 IN | HEART RATE: 76 BPM | BODY MASS INDEX: 30.83 KG/M2

## 2017-11-02 DIAGNOSIS — R00.0 SINUS TACHYCARDIA: ICD-10-CM

## 2017-11-02 DIAGNOSIS — R55 SYNCOPE AND COLLAPSE: ICD-10-CM

## 2017-11-02 DIAGNOSIS — R00.0 SINUS TACHYCARDIA: Primary | ICD-10-CM

## 2017-11-02 DIAGNOSIS — Z45.09 ENCOUNTER FOR ELECTRONIC ANALYSIS OF REVEAL EVENT RECORDER: Primary | ICD-10-CM

## 2017-11-02 PROCEDURE — 93291 INTERROG DEV EVAL SCRMS IP: CPT | Performed by: INTERNAL MEDICINE

## 2017-11-02 PROCEDURE — G8427 DOCREV CUR MEDS BY ELIG CLIN: HCPCS | Performed by: NURSE PRACTITIONER

## 2017-11-02 PROCEDURE — G8417 CALC BMI ABV UP PARAM F/U: HCPCS | Performed by: NURSE PRACTITIONER

## 2017-11-02 PROCEDURE — G8484 FLU IMMUNIZE NO ADMIN: HCPCS | Performed by: NURSE PRACTITIONER

## 2017-11-02 PROCEDURE — 99213 OFFICE O/P EST LOW 20 MIN: CPT | Performed by: NURSE PRACTITIONER

## 2017-11-02 PROCEDURE — 1036F TOBACCO NON-USER: CPT | Performed by: NURSE PRACTITIONER

## 2017-11-02 RX ORDER — METOPROLOL SUCCINATE 25 MG/1
50 TABLET, EXTENDED RELEASE ORAL 2 TIMES DAILY
Qty: 60 TABLET | Refills: 11 | Status: SHIPPED | OUTPATIENT
Start: 2017-11-02 | End: 2017-11-07 | Stop reason: DRUGHIGH

## 2017-11-02 NOTE — LETTER
· Heart tones are crisp and normal. Regular S1 and S2.  · Jugular venous pulsation Normal  · The carotid upstroke is normal in amplitude and contour without delay or bruit  · Peripheral pulses are symmetrical and full   Abdomen:  · No masses or tenderness  · Bowel sounds present  Extremities:  ·  No cyanosis or clubbing  ·  No lower extremity edema  ·  Skin: warm and dry  Neurological:  · Alert and oriented  · Moves all extremities well  · No abnormalities of mood, affect, memory, mentation, or behavior are noted    DATA:    ECG 2/7/2017:  NSR HR 72    2 week monitor 6/2016:   Symptoms with sinus tach, max     Echo 10/22/2015:  Normal left ventricle size, wall thickness and systolic function with an   estimated ejection fraction of 55%.   No regional wall motion abnormalities are seen.   Diastolic filling parameters suggest normal diastolic filing pressure.   Mild tricuspid regurgitation.   Systolic pulmonary artery pressure (SPAP) is normal and estimated at 29 mmHg   (RA pressure 3 mmHg). Stress echo 1/6/2016:  Normal stress ECHO.   The stress ECG showed no ischemia at target heart rate. Pryor Score of 4. Pt with poor exercise tolerance. CARDIOLOGY LABS:   CBC: No results for input(s): WBC, HGB, HCT, PLT in the last 72 hours. BMP: No results for input(s): NA, K, CO2, BUN, CREATININE, LABGLOM, GLUCOSE in the last 72 hours. PT/INR: No results for input(s): PROTIME, INR in the last 72 hours. APTT:No results for input(s): APTT in the last 72 hours. FASTING LIPID PANEL:No results found for: HDL, LDLDIRECT, LDLCALC, TRIG  LIVER PROFILE:No results for input(s): AST, ALT, ALB in the last 72 hours. Assessment:   1.  Symptomatic tachycardia: s/p loop recorder implant in 5/2017   -device check today shows one tachy episode since increasing Toprol in 9/2017   -patient had agitation with metoprolol tartrate, declined betaxolol, had hypotension with propranolol, and has tolerated metoprolol succinate

## 2017-11-02 NOTE — PROGRESS NOTES
[address]   Electrophysiology  Note              Date:  November 2, 2017  Patient name: Alice Black  YOB: 1990    Primary Care physician: Yamile Miner CNP    HISTORY OF PRESENT ILLNESS: The patient is a 28 y.o.  female with a past medical history of syncope, sinus tachycardia, decreased exercise tolerance, and palpitations. In 10/2015 she complained of presyncope, blacking out, and tachycardia. Echo in 10/2015 was normal. She was started on midodrine and continued to have presyncope associated with exertion. Metoprolol was then started. A stress echo in 1/2016 was normal. She wore a 2 week monitor off all cardiac medications in 6/2016 that showed symptoms associated with sinus tachycardia, max . She complained of agitation with metoprolol and was switched to betaxolol. The patient never started the betaxolol due to her fear of potential side effects. At her visit on 1/11/2017 she complained of tachycardia and palpitations. Propranolol 20mg po BID was started. She went to the ER on 1/24/2017 complaining of near syncope. Her symptoms improved with IV fluids. She called to report experiencing a low BP and heart rate after taking propranolol so it was decreased to 20mg po QD. She continued to have near syncopal events and had a negative tilt table test in 2/2017. In 4/2017 she complained of recurrent syncope and memory loss. She was referred to neurology and had a normal MRI and EEG. A loop recorder was implanted in 5/2017 to rule out an arrhythmia as a cause of syncope. Her device checks showed PSVT in 7/2017 and Toprol was started. In 9/2017, her Toprol was increased to 50mg po QD due to sinus tach. Today she is being seen for a follow up for near syncope and tachycardia. Her device check shows one episode of sinus tach (x22 seconds) since increasing Toprol in 9/2017. BP is 96/64. She states she has good days and bad days.  Had been feeling better with the increased dose of Toprol but has not been well for two weeks. She has difficulty describing her symptoms on a bad day, except reports waking up tired. States she had a near syncopal event on 11/1/2017 after becoming dizzy when bending over. She complains of her heart racing at times and has also has been having hip pain. Denies chest pain and shortness of breath. The patient lost her remote for her loop recorder and has not yet had it replaced. Past Medical History:   has a past medical history of Anemia; Anxiety; Heart abnormalities; Miscarriage; Ovarian cyst; and Tricuspid regurgitation. Past Surgical History:   has a past surgical history that includes Appendectomy (2006); Ovarian cyst surgery; Endometrial ablation (02/01/2017); and Insertable Cardiac Monitor (05/24/2017). Home Medications:    Prior to Admission medications    Medication Sig Start Date End Date Taking? Authorizing Provider   metoprolol succinate (TOPROL XL) 25 MG extended release tablet Take 2 tablets by mouth 2 times daily 11/2/17  Yes Edwin Ochoa CNP   pantoprazole (PROTONIX) 40 MG tablet Take 1 tablet by mouth daily 9/28/17  Yes Favio Black CNP   methocarbamol (ROBAXIN) 500 MG tablet Take 1 tablet by mouth 3 times daily as needed (Muscle spasms) 9/28/17  Yes Favio Black CNP   predniSONE (DELTASONE) 10 MG tablet Take 40 mg for 3 days then 30 mg for 3 days then 20 mg for 3 days then 10 mg for 3 days 9/28/17  Yes Favio Black CNP   HYDROcodone-acetaminophen (NORCO) 5-325 MG per tablet Take 1 tablet by mouth every 6 hours as needed for Pain .  7/26/17  Yes Historical Provider, MD   ibuprofen (ADVIL;MOTRIN) 800 MG tablet Take 800 mg by mouth every 6 hours as needed for Pain 7/14/17  Yes Historical Provider, MD   ondansetron (ZOFRAN) 4 MG tablet Take 4 mg by mouth every 8 hours as needed for Nausea 7/26/17  Yes Historical Provider, MD   buPROPion (WELLBUTRIN SR) 150 MG extended release tablet Take 1 tablet by mouth 2 times daily 4/4/17  Yes Mohan Shrestha NP       Allergies:  Demerol; Meperidine; Percocet [oxycodone-acetaminophen]; and Procardia [nifedipine]    Social History:   reports that she has never smoked. She has never used smokeless tobacco. She reports that she does not drink alcohol or use drugs. Family History: family history includes Arthritis in her mother; Cancer in her maternal grandmother and paternal grandmother; Mental Retardation in her maternal uncle. Review of Systems   Constitutional: + fatigue   HENT: Negative. Eyes: Negative. Respiratory: Negative. Cardiovascular: see HPI  Gastrointestinal: Negative. Genitourinary: Negative. Musculoskeletal: + hip pain  Skin: Negative. Neurological: + dizziness  Hematological: Negative. Psychiatric/Behavioral: Negative. PHYSICAL EXAM:    Physical Examination:    BP 96/64   Pulse 76   Ht 5' 3\" (1.6 m)   Wt 174 lb (78.9 kg)   BMI 30.82 kg/m²      Constitutional and general appearance: alert, cooperative, no distress and appears stated age  [de-identified]: PERRL, no cervical lymphadenopathy. No masses palpable.  Normal oral mucosa  Respiratory:  · Normal excursion and expansion without use of accessory muscles  · Resp auscultation: Normal breath sounds without dullness or wheezing  Cardiovascular:  · The apical impulse is not displaced  · Heart tones are crisp and normal. Regular S1 and S2.  · Jugular venous pulsation Normal  · The carotid upstroke is normal in amplitude and contour without delay or bruit  · Peripheral pulses are symmetrical and full   Abdomen:  · No masses or tenderness  · Bowel sounds present  Extremities:  ·  No cyanosis or clubbing  ·  No lower extremity edema  ·  Skin: warm and dry  Neurological:  · Alert and oriented  · Moves all extremities well  · No abnormalities of mood, affect, memory, mentation, or behavior are noted    DATA:    ECG 2/7/2017:  NSR HR 72    2 week monitor 6/2016:   Symptoms with sinus tach, max

## 2017-11-03 NOTE — PROGRESS NOTES
Device interrogation by company representative. See interrogation for further details. Patient to see NP-Kira today.

## 2017-11-06 ENCOUNTER — TELEPHONE (OUTPATIENT)
Dept: CARDIOLOGY CLINIC | Age: 27
End: 2017-11-06

## 2017-11-06 RX ORDER — METOPROLOL SUCCINATE 100 MG/1
100 TABLET, EXTENDED RELEASE ORAL DAILY
Qty: 30 TABLET | Refills: 3 | Status: CANCELLED | OUTPATIENT
Start: 2017-11-06

## 2017-11-06 RX ORDER — METOPROLOL SUCCINATE 25 MG/1
25 TABLET, EXTENDED RELEASE ORAL 2 TIMES DAILY
Qty: 30 TABLET | Refills: 5 | Status: SHIPPED | OUTPATIENT
Start: 2017-11-06 | End: 2017-11-07 | Stop reason: DRUGHIGH

## 2017-11-06 NOTE — TELEPHONE ENCOUNTER
Prescription was supposed to be for 25mg po BID. I sent it over. If denied, she should split 50mg tablets into half BID.

## 2017-11-06 NOTE — TELEPHONE ENCOUNTER
[employer] will not pay for 50 mg BID being the succinate. Can I order 100 and just let her know to cur them in half?   TY

## 2017-11-07 RX ORDER — METOPROLOL SUCCINATE 50 MG/1
50 TABLET, EXTENDED RELEASE ORAL DAILY
Qty: 30 TABLET | Refills: 3 | Status: SHIPPED | OUTPATIENT
Start: 2017-11-07 | End: 2018-02-02 | Stop reason: SDUPTHER

## 2017-11-07 NOTE — COMMUNICATION BODY
increased dose of Toprol but has not been well for two weeks. She has difficulty describing her symptoms on a bad day, except reports waking up tired. States she had a near syncopal event on 11/1/2017 after becoming dizzy when bending over. She complains of her heart racing at times and has also has been having hip pain. Denies chest pain and shortness of breath. The patient lost her remote for her loop recorder and has not yet had it replaced. Past Medical History:   has a past medical history of Anemia; Anxiety; Heart abnormalities; Miscarriage; Ovarian cyst; and Tricuspid regurgitation. Past Surgical History:   has a past surgical history that includes Appendectomy (2006); Ovarian cyst surgery; Endometrial ablation (02/01/2017); and Insertable Cardiac Monitor (05/24/2017). Home Medications:    Prior to Admission medications    Medication Sig Start Date End Date Taking? Authorizing Provider   metoprolol succinate (TOPROL XL) 25 MG extended release tablet Take 2 tablets by mouth 2 times daily 11/2/17  Yes Claude Yi CNP   pantoprazole (PROTONIX) 40 MG tablet Take 1 tablet by mouth daily 9/28/17  Yes Rella No, CNP   methocarbamol (ROBAXIN) 500 MG tablet Take 1 tablet by mouth 3 times daily as needed (Muscle spasms) 9/28/17  Yes Rella No, CNP   predniSONE (DELTASONE) 10 MG tablet Take 40 mg for 3 days then 30 mg for 3 days then 20 mg for 3 days then 10 mg for 3 days 9/28/17  Yes Rella No, CNP   HYDROcodone-acetaminophen (NORCO) 5-325 MG per tablet Take 1 tablet by mouth every 6 hours as needed for Pain .  7/26/17  Yes Historical Provider, MD   ibuprofen (ADVIL;MOTRIN) 800 MG tablet Take 800 mg by mouth every 6 hours as needed for Pain 7/14/17  Yes Historical Provider, MD   ondansetron (ZOFRAN) 4 MG tablet Take 4 mg by mouth every 8 hours as needed for Nausea 7/26/17  Yes Historical Provider, MD   buPROPion (WELLBUTRIN SR) 150 MG extended release tablet Take 1 tablet

## 2017-12-05 ENCOUNTER — NURSE ONLY (OUTPATIENT)
Dept: CARDIOLOGY CLINIC | Age: 27
End: 2017-12-05

## 2017-12-05 DIAGNOSIS — Z45.09 ENCOUNTER FOR ELECTRONIC ANALYSIS OF REVEAL EVENT RECORDER: Primary | ICD-10-CM

## 2017-12-05 NOTE — LETTER
[address] [facility] [phone]  [address] [phone]    Pacemaker/Defibrillator Clinic          12/11/17        [address] [facility] [address]        Dear Jessica Carmen    This letter is to inform you that we received the transmission from your monitor at home that checks your pacemaker and/or defibrillator, or implanted heart monitor. Everything is within normal limits. The next date your monitor will automatically transmit will be 1/9/18. Please do not send additional routine transmissions unless specifically requested. Your device and monitor are wireless and most transmit cellularly, but please periodically check your monitor is still plugged in to the electrical outlet. If you still use the telephone land line to send please ensure the connection to the phone katy is secure. This will help to ensure successful automatic transmissions in the future. Also, the monitor needs to be close to you while sleeping at night. Please be aware that the remote device transmission sites are periodically monitored only during regular business hours during which simultaneous in-office device clinics are being run. If your transmission requires attention, we will contact you as soon as possible. Thank you.             [address]

## 2017-12-07 ENCOUNTER — OFFICE VISIT (OUTPATIENT)
Dept: FAMILY MEDICINE CLINIC | Age: 27
End: 2017-12-07

## 2017-12-07 VITALS
OXYGEN SATURATION: 97 % | BODY MASS INDEX: 30.23 KG/M2 | HEIGHT: 63 IN | SYSTOLIC BLOOD PRESSURE: 114 MMHG | HEART RATE: 90 BPM | DIASTOLIC BLOOD PRESSURE: 76 MMHG | WEIGHT: 170.6 LBS | RESPIRATION RATE: 16 BRPM

## 2017-12-07 DIAGNOSIS — M25.532 LEFT WRIST PAIN: Primary | ICD-10-CM

## 2017-12-07 DIAGNOSIS — M25.552 LEFT HIP PAIN: ICD-10-CM

## 2017-12-07 DIAGNOSIS — R20.2 LEFT HAND PARESTHESIA: ICD-10-CM

## 2017-12-07 PROCEDURE — G8484 FLU IMMUNIZE NO ADMIN: HCPCS | Performed by: NURSE PRACTITIONER

## 2017-12-07 PROCEDURE — 1036F TOBACCO NON-USER: CPT | Performed by: NURSE PRACTITIONER

## 2017-12-07 PROCEDURE — G8417 CALC BMI ABV UP PARAM F/U: HCPCS | Performed by: NURSE PRACTITIONER

## 2017-12-07 PROCEDURE — 99213 OFFICE O/P EST LOW 20 MIN: CPT | Performed by: NURSE PRACTITIONER

## 2017-12-07 PROCEDURE — G8427 DOCREV CUR MEDS BY ELIG CLIN: HCPCS | Performed by: NURSE PRACTITIONER

## 2017-12-07 ASSESSMENT — PATIENT HEALTH QUESTIONNAIRE - PHQ9
SUM OF ALL RESPONSES TO PHQ QUESTIONS 1-9: 0
1. LITTLE INTEREST OR PLEASURE IN DOING THINGS: 0
2. FEELING DOWN, DEPRESSED OR HOPELESS: 0
SUM OF ALL RESPONSES TO PHQ9 QUESTIONS 1 & 2: 0

## 2017-12-07 ASSESSMENT — ENCOUNTER SYMPTOMS
EYES NEGATIVE: 1
GASTROINTESTINAL NEGATIVE: 1
RESPIRATORY NEGATIVE: 1

## 2017-12-07 NOTE — PROGRESS NOTES
Subjective:     Patient Name: Latonya Valdez is a 28 y.o. female. Chief Complaint   Patient presents with    Wrist Pain     recheck from 09/2017, was told to do exercises, and wers splints, and used meds, no of these have helped. extreme pain and weakness, that is off and on.     Hip Pain     referred to ortho but has not gone     Other     pt states she does not get flu vac    Headache     2 weeks, off on used otc ibuprofen that gave relief, frontal pain mostly        Wrist Pain    The pain is present in the left wrist. This is a recurrent problem. The current episode started more than 1 month ago (started approx 8/2017). There has been no history of extremity trauma. The problem occurs daily. The problem has been gradually worsening. The quality of the pain is described as sharp and aching. The pain is moderate. Associated symptoms include joint swelling, a limited range of motion, numbness, stiffness (in the morning) and tingling. Pertinent negatives include no fever, inability to bear weight, itching or joint locking. The symptoms are aggravated by activity (hand goes numb when talking on phone and while driving). She has tried rest, heat, cold and NSAIDS (wrist,exercises) for the symptoms. The treatment provided no relief. Family history does not include gout or rheumatoid arthritis. There is no history of diabetes, gout, osteoarthritis or rheumatoid arthritis. Hip pain  Patient continues to complain of left hip pain that radiates into the groin area and lateral side of her hip. She has seen or so on October 10, 2017. At that time orthopedist recommended physical therapy and to continue NSAIDs and then to return for further evaluation. Patient only went to one physical therapy evaluation appointment on October 24, 2017 and has not returned.  The patient states that her hip pain has not improved and she has not scheduled follow-up with the orthopedist    Review of Systems   Constitutional: Negative. Negative for fever. HENT: Negative. Eyes: Negative. Respiratory: Negative. Cardiovascular: Negative. Gastrointestinal: Negative. Genitourinary: Negative. Musculoskeletal: Positive for stiffness (in the morning). Negative for gout. Skin: Negative. Negative for itching. Neurological: Positive for tingling and numbness. Endo/Heme/Allergies: Negative. Psychiatric/Behavioral: Negative. All other systems reviewed and are negative. Past Medical History:   Diagnosis Date    Anemia     Currently on Iron PO    Anxiety     Heart abnormalities     \"heart flutters real fast\"    Miscarriage 2009    Ovarian cyst     Tricuspid regurgitation      Family History   Problem Relation Age of Onset    Cancer Maternal Grandmother      Thyroid    Cancer Paternal Grandmother     Arthritis Mother     Mental Retardation Maternal Uncle      Past Surgical History:   Procedure Laterality Date    APPENDECTOMY  2006    ENDOMETRIAL ABLATION  02/01/2017    With Tubal ligation    INSERTABLE CARDIAC MONITOR  05/24/2017    Loop Recorder Left Chest    OVARIAN CYST SURGERY       Social History     Social History    Marital status:      Spouse name: N/A    Number of children: N/A    Years of education: N/A     Occupational History    Not on file.      Social History Main Topics    Smoking status: Never Smoker    Smokeless tobacco: Never Used    Alcohol use No    Drug use: No    Sexual activity: Yes     Partners: Male     Other Topics Concern    Not on file     Social History Narrative    No narrative on file     Current Outpatient Prescriptions   Medication Sig Dispense Refill    metoprolol succinate (TOPROL XL) 50 MG extended release tablet Take 1 tablet by mouth daily 30 tablet 3    ibuprofen (ADVIL;MOTRIN) 800 MG tablet Take 800 mg by mouth every 6 hours as needed for Pain      buPROPion (WELLBUTRIN SR) 150 MG extended release tablet Take 1 tablet by mouth 2 times daily 60 tablet 3     No current facility-administered medications for this visit. Objective:       Physical Exam   Constitutional: She is oriented to person, place, and time. Vital signs are normal. She appears well-developed and well-nourished. She is cooperative. Non-toxic appearance. She does not have a sickly appearance. No distress. HENT:   Head: Normocephalic and atraumatic. Right Ear: Hearing, tympanic membrane and ear canal normal.   Left Ear: Hearing, tympanic membrane and ear canal normal.   Nose: Nose normal.   Mouth/Throat: Uvula is midline, oropharynx is clear and moist and mucous membranes are normal.   Eyes: Conjunctivae and lids are normal.   Neck: Neck supple. Cardiovascular: Normal rate, regular rhythm, normal heart sounds and normal pulses. Pulmonary/Chest: Effort normal and breath sounds normal. No accessory muscle usage. No respiratory distress. Abdominal: Soft. Normal appearance. There is no tenderness. Musculoskeletal:        Left wrist: She exhibits tenderness. She exhibits no swelling, no effusion, no crepitus, no deformity and no laceration. Left hip: She exhibits tenderness. She exhibits no swelling and no crepitus. Exam reveals positive Phalen and Tinel sign on left. There is no muscle atrophy noted. Lymphadenopathy:        Head (right side): No submental and no submandibular adenopathy present. Head (left side): No submental and no submandibular adenopathy present. She has no cervical adenopathy. Neurological: She is alert and oriented to person, place, and time. Skin: Skin is warm and dry. No lesion and no rash noted. Psychiatric: She has a normal mood and affect.  Her speech is normal and behavior is normal. Cognition and memory are normal.       /76 (Site: Right Arm, Position: Sitting, Cuff Size: Medium Adult)   Pulse 90   Resp 16   Ht 5' 2.99\" (1.6 m)   Wt 170 lb 9.6 oz (77.4 kg)   LMP 08/07/2017 Comment: hyst 08/2017  SpO2 97%   Breastfeeding? No   BMI 30.23 kg/m²   Body mass index is 30.23 kg/m². BP Readings from Last 2 Encounters:   12/07/17 114/76   11/02/17 96/64       Wt Readings from Last 3 Encounters:   12/07/17 170 lb 9.6 oz (77.4 kg)   11/02/17 174 lb (78.9 kg)   10/10/17 173 lb 15.1 oz (78.9 kg)       Lab Review   No visits with results within 2 Month(s) from this visit.    Latest known visit with results is:   Office Visit on 09/28/2017   Component Date Value    Magnesium 09/29/2017 1.90     WBC 09/28/2017 5.3     RBC 09/28/2017 4.26     Hemoglobin 09/28/2017 13.8     Hematocrit 09/28/2017 40.9     MCV 09/28/2017 96.0     MCH 09/28/2017 32.4     MCHC 09/28/2017 33.7     RDW 09/28/2017 12.8     Platelets 48/58/9699 243     MPV 09/28/2017 9.6     Neutrophils % 09/28/2017 60.5     Lymphocytes % 09/28/2017 31.5     Monocytes % 09/28/2017 5.8     Eosinophils % 09/28/2017 1.4     Basophils % 09/28/2017 0.8     Neutrophils # 09/28/2017 3.2     Lymphocytes # 09/28/2017 1.7     Monocytes # 09/28/2017 0.3     Eosinophils # 09/28/2017 0.1     Basophils # 09/28/2017 0.0     Sodium 09/29/2017 140     Potassium 09/29/2017 4.2     Chloride 09/29/2017 101     CO2 09/29/2017 26     Anion Gap 09/29/2017 13     Glucose 09/29/2017 100*    BUN 09/29/2017 12     CREATININE 09/29/2017 0.6     GFR Non- 09/29/2017 >60     GFR  09/29/2017 >60     Calcium 09/29/2017 9.6     Total Protein 09/29/2017 7.1     Alb 09/29/2017 4.4     Albumin/Globulin Ratio 09/29/2017 1.6     Total Bilirubin 09/29/2017 0.4     Alkaline Phosphatase 09/29/2017 59     ALT 09/29/2017 21     AST 09/29/2017 19     Globulin 09/29/2017 2.7     T4 Free 09/29/2017 1.0     TSH 09/29/2017 3.02     Vit D, 25-Hydroxy 09/29/2017 30.9     Vitamin B-12 09/29/2017 356     Folate 09/29/2017 >20.00     Rheumatoid Factor 09/29/2017 <10.0     Sed Rate 09/29/2017 10        No results found for this visit on 12/07/17. Assessment:       1. Left wrist pain    2. Left hand paresthesia    3. Left hip pain               Plan:       Roselyn was seen today for wrist pain, hip pain and other. Diagnoses and all orders for this visit:    Left wrist pain  -     EMG; Future  Patient is to continue with the wrist splint. Ice and anti-inflammatories. Patient given carpal tunnel exercises to perform at home. Once the EMG results return may consider orthopedic referral    Left hand paresthesia  -     EMG; Future    Left hip pain  I recommend that the patient return to physical therapy for her left hip and schedule a follow-up appointment with Dr. Deshawn Urbina the orthopedist        Patient has been instructed call the office immediately with new symptoms, change in symptoms or worsening of symptoms. If this is not feasible, patient is instructed to report to the emergency room. Medication profile reviewed. Medication side effects and possible impairments from medications were discussed as applicable. Allergies were reviewed. Health maintenance was reviewed and updated as appropriate.

## 2017-12-07 NOTE — PATIENT INSTRUCTIONS
[email or web address] and sign in to your Episonahart account. Enter L796 in the [employer] box to learn more about \"Carpal Tunnel Syndrome: Exercises. \"     If you do not have an account, please click on the \"Sign Up Now\" link. Current as of: March 21, 2017  Content Version: 11.4  © 6522-0393 Healthwise, [employer]. Care instructions adapted under license by [facility] ([facility]). If you have questions about a medical condition or this instruction, always ask your healthcare professional. [address] any warranty or liability for your use of this information.

## 2017-12-11 DIAGNOSIS — F32.89 OTHER DEPRESSION: ICD-10-CM

## 2017-12-11 RX ORDER — BUPROPION HYDROCHLORIDE 150 MG/1
150 TABLET, EXTENDED RELEASE ORAL 2 TIMES DAILY
Qty: 60 TABLET | Refills: 3 | Status: SHIPPED | OUTPATIENT
Start: 2017-12-11 | End: 2018-09-27 | Stop reason: CLARIF

## 2018-01-09 ENCOUNTER — NURSE ONLY (OUTPATIENT)
Dept: CARDIOLOGY CLINIC | Age: 28
End: 2018-01-09

## 2018-01-09 DIAGNOSIS — Z45.09 ENCOUNTER FOR ELECTRONIC ANALYSIS OF REVEAL EVENT RECORDER: Primary | ICD-10-CM

## 2018-01-23 ENCOUNTER — NURSE ONLY (OUTPATIENT)
Dept: CARDIOLOGY CLINIC | Age: 28
End: 2018-01-23

## 2018-01-23 DIAGNOSIS — Z45.09 ENCOUNTER FOR ELECTRONIC ANALYSIS OF REVEAL EVENT RECORDER: Primary | ICD-10-CM

## 2018-01-23 DIAGNOSIS — R00.0 SINUS TACHYCARDIA: ICD-10-CM

## 2018-01-23 DIAGNOSIS — R55 SYNCOPE AND COLLAPSE: ICD-10-CM

## 2018-01-23 PROCEDURE — 93299 PR REM INTERROG ICPMS/SCRMS <30 D TECH REVIEW: CPT | Performed by: INTERNAL MEDICINE

## 2018-01-23 PROCEDURE — 93298 REM INTERROG DEV EVAL SCRMS: CPT | Performed by: INTERNAL MEDICINE

## 2018-02-02 ENCOUNTER — OFFICE VISIT (OUTPATIENT)
Dept: CARDIOLOGY CLINIC | Age: 28
End: 2018-02-02

## 2018-02-02 ENCOUNTER — PROCEDURE VISIT (OUTPATIENT)
Dept: CARDIOLOGY CLINIC | Age: 28
End: 2018-02-02

## 2018-02-02 VITALS
DIASTOLIC BLOOD PRESSURE: 50 MMHG | BODY MASS INDEX: 30.48 KG/M2 | HEART RATE: 69 BPM | SYSTOLIC BLOOD PRESSURE: 102 MMHG | WEIGHT: 172 LBS | OXYGEN SATURATION: 98 %

## 2018-02-02 DIAGNOSIS — R00.0 SINUS TACHYCARDIA: ICD-10-CM

## 2018-02-02 DIAGNOSIS — Z45.09 ENCOUNTER FOR ELECTRONIC ANALYSIS OF REVEAL EVENT RECORDER: ICD-10-CM

## 2018-02-02 DIAGNOSIS — Z45.09 ENCOUNTER FOR LOOP RECORDER CHECK: ICD-10-CM

## 2018-02-02 DIAGNOSIS — R55 SYNCOPE AND COLLAPSE: ICD-10-CM

## 2018-02-02 DIAGNOSIS — Z45.09 ENCOUNTER FOR LOOP RECORDER CHECK: Primary | ICD-10-CM

## 2018-02-02 DIAGNOSIS — R00.2 PALPITATIONS: Primary | ICD-10-CM

## 2018-02-02 PROCEDURE — 99214 OFFICE O/P EST MOD 30 MIN: CPT | Performed by: INTERNAL MEDICINE

## 2018-02-02 PROCEDURE — G8484 FLU IMMUNIZE NO ADMIN: HCPCS | Performed by: INTERNAL MEDICINE

## 2018-02-02 PROCEDURE — G8427 DOCREV CUR MEDS BY ELIG CLIN: HCPCS | Performed by: INTERNAL MEDICINE

## 2018-02-02 PROCEDURE — 1036F TOBACCO NON-USER: CPT | Performed by: INTERNAL MEDICINE

## 2018-02-02 PROCEDURE — G8417 CALC BMI ABV UP PARAM F/U: HCPCS | Performed by: INTERNAL MEDICINE

## 2018-02-02 PROCEDURE — 93291 INTERROG DEV EVAL SCRMS IP: CPT | Performed by: INTERNAL MEDICINE

## 2018-02-02 RX ORDER — METOPROLOL SUCCINATE 25 MG/1
TABLET, EXTENDED RELEASE ORAL
Qty: 90 TABLET | Refills: 1 | Status: SHIPPED | OUTPATIENT
Start: 2018-02-02 | End: 2018-06-12 | Stop reason: SDUPTHER

## 2018-02-02 NOTE — PROGRESS NOTES
Interrogation of implanted cardiac event monitor shows normal function. Symptomatic ST recorded. Patient to see Dr. Martinez Duvall today. 2/2/18-poor cell signal-she will send manual transmission when sees carelink monitor disconnected.

## 2018-02-02 NOTE — LETTER
· No abnormalities of mood, affect, memory, mentation, or behavior are noted      DATA:    ECG:  normal EKG, normal sinus rhythm, unchanged from previous tracings. ECHO: 1/6/2016   Normal stress ECHO.   The stress ECG showed no ischemia at target heart rate. Pryor Score of 4 . Pt   with poor exercise tolerance. IMPRESSION:    1. Sinus Tachycardia    2. Syncope,      Event monitor reviewed with patient. Symptoms with sinus tachycardia and normal sinus rhythm      RECOMMENDATIONS:  1. Increase metoprolol to 50 mg in the AM and 12.5 mg in the PM  2. Continue to monitor BP. Call office in 7-10 days with updates  3. Keep up with hydration  4. Follow up with Rosemary Delgado NP in 6 months    QUALITY MEASURES  1. Tobacco Cessation Counseling: NA  2. Retake of BP if >140/90:   NA  3. Documentation to PCP/referring for new patient:  Sent to PCP at close of office visit  4. CAD patient on anti-platelet: NA  5. CAD patient on STATIN therapy:  NA  6. Patient with CHF and aFib on anticoagulation:  NA       All questions and concerns were addressed to the patient/family. Alternatives to my treatment were discussed. GREGORIA YusufC. Electrophysiology  [facility] [address]. [place], [address]  Phone: [phone]  Fax: [phone]           If you have questions, please do not hesitate to call me. I look forward to following Roselyn along with you.     Sincerely,        Jovana Mariscal MD

## 2018-02-12 ENCOUNTER — TELEPHONE (OUTPATIENT)
Dept: CARDIOLOGY CLINIC | Age: 28
End: 2018-02-12

## 2018-02-12 ENCOUNTER — PROCEDURE VISIT (OUTPATIENT)
Dept: CARDIOLOGY CLINIC | Age: 28
End: 2018-02-12

## 2018-02-12 DIAGNOSIS — Z45.09 ENCOUNTER FOR LOOP RECORDER CHECK: Primary | ICD-10-CM

## 2018-02-12 DIAGNOSIS — Z45.09 ENCOUNTER FOR ELECTRONIC ANALYSIS OF REVEAL EVENT RECORDER: ICD-10-CM

## 2018-02-14 NOTE — PROGRESS NOTES
Remote interrogation of implanted cardiac event monitor shows normal function. 3 new symptom episodes recorded on 2/6/18, 2/9/18 and 2/12/18-NSR.

## 2018-02-14 NOTE — COMMUNICATION BODY
No change in gait, balance, coordination, mood, affect, memory,  mentation, behavior. · Psychiatric: No anxiety, or depression. · Endocrine: No temperature intolerance. No excessive thirst, fluid intake, or urination. No tremor. · Hematologic/Lymphatic: No abnormal bruising or bleeding, blood clots or  swollen lymph nodes. · Allergic/Immunologic: No nasal congestion or hives. Physical Examination:    [facility] 08/07/2017 Comment: hyst 08/2017     Constitutional and General Appearance: alert, cooperative, no distress and appears stated age  [de-identified]: PERRL, no cervical lymphadenopathy. No masses palpable. Normal oral mucosa  Respiratory:  · Normal excursion and expansion without use of accessory muscles  · Resp Auscultation: Normal breath sounds without dullness or wheezing  Cardiovascular:  · The apical impulse is not displaced  · Heart tones are crisp and normal. regular S1 and S2.  · Jugular venous pulsation Normal  · The carotid upstroke is normal in amplitude and contour without delay or bruit  · Peripheral pulses are symmetrical and full  Abdomen:  · No masses or tenderness  · Bowel sounds present  Extremities:  ·  No Cyanosis or Clubbing  ·  Lower extremity edema: No  · Skin: Warm and dry  Neurological:  · Alert and oriented. · Moves all extremities well  · No abnormalities of mood, affect, memory, mentation, or behavior are noted      DATA:    ECG:  normal EKG, normal sinus rhythm, unchanged from previous tracings. ECHO: 1/6/2016   Normal stress ECHO.   The stress ECG showed no ischemia at target heart rate. Pryor Score of 4 . Pt   with poor exercise tolerance. IMPRESSION:    1. Sinus Tachycardia    2. Syncope,      Event monitor reviewed with patient. Symptoms with sinus tachycardia and normal sinus rhythm      RECOMMENDATIONS:  1. Increase metoprolol to 50 mg in the AM and 12.5 mg in the PM  2. Continue to monitor BP. Call office in 7-10 days with updates  3. Keep up with hydration  4.  Follow up with

## 2018-02-15 ENCOUNTER — TELEPHONE (OUTPATIENT)
Dept: CARDIOLOGY CLINIC | Age: 28
End: 2018-02-15

## 2018-02-16 ENCOUNTER — PROCEDURE VISIT (OUTPATIENT)
Dept: CARDIOLOGY CLINIC | Age: 28
End: 2018-02-16

## 2018-02-16 DIAGNOSIS — Z45.09 ENCOUNTER FOR LOOP RECORDER CHECK: Primary | ICD-10-CM

## 2018-02-16 NOTE — PROGRESS NOTES
See PACEART report under Cardiology tab. Carelink/loop recorder transmission. 2 new symptom episodes on 2-14 and 2-15. They appear to be NSR, no ectopy seen. Will forward to Dr. Vahe Paz for review.

## 2018-02-18 ENCOUNTER — TELEPHONE (OUTPATIENT)
Dept: FAMILY MEDICINE CLINIC | Age: 28
End: 2018-02-18

## 2018-02-18 DIAGNOSIS — K64.9 HEMORRHOIDS, UNSPECIFIED HEMORRHOID TYPE: ICD-10-CM

## 2018-02-18 DIAGNOSIS — K59.04 CHRONIC IDIOPATHIC CONSTIPATION: Primary | ICD-10-CM

## 2018-02-18 RX ORDER — MAGNESIUM CITRATE
SOLUTION, ORAL ORAL
Qty: 296 ML | Refills: 0 | Status: SHIPPED | OUTPATIENT
Start: 2018-02-18 | End: 2018-09-27 | Stop reason: CLARIF

## 2018-02-18 RX ORDER — AMOXICILLIN 250 MG
2 CAPSULE ORAL DAILY
Qty: 30 TABLET | Refills: 0 | Status: SHIPPED | OUTPATIENT
Start: 2018-02-18 | End: 2018-03-31

## 2018-02-18 RX ORDER — HYDROCORTISONE ACETATE 25 MG/1
25 SUPPOSITORY RECTAL EVERY 12 HOURS
Qty: 14 SUPPOSITORY | Refills: 0 | Status: SHIPPED | OUTPATIENT
Start: 2018-02-18 | End: 2018-09-27 | Stop reason: CLARIF

## 2018-02-18 RX ORDER — POLYETHYLENE GLYCOL 3350 17 G/17G
POWDER, FOR SOLUTION ORAL
Qty: 3 BOTTLE | Refills: 0 | Status: SHIPPED | OUTPATIENT
Start: 2018-02-18 | End: 2018-03-31

## 2018-03-09 NOTE — PROGRESS NOTES
Remote interrogation of implanted cardiac event monitor shows normal function. Episodes recorded from 1/16-3/5/18.  6 new symptom episodes show SR with occ pac's. 4 new tachy episodes show ST, artifact also noted.   Will review with RPS, she takes Toprol XL

## 2018-03-11 PROCEDURE — 93298 REM INTERROG DEV EVAL SCRMS: CPT | Performed by: INTERNAL MEDICINE

## 2018-03-11 PROCEDURE — 93299 PR REM INTERROG ICPMS/SCRMS <30 D TECH REVIEW: CPT | Performed by: INTERNAL MEDICINE

## 2018-03-12 ENCOUNTER — PROCEDURE VISIT (OUTPATIENT)
Dept: CARDIOLOGY CLINIC | Age: 28
End: 2018-03-12

## 2018-03-12 DIAGNOSIS — Z45.09 ENCOUNTER FOR LOOP RECORDER CHECK: Primary | ICD-10-CM

## 2018-03-12 NOTE — PROGRESS NOTES
Carelink/loop recorder transmission. Symptomatic SVT recorded 3/10/18, she is on Toprol XL 25mg-50mg AM/12.5mg PM.   [facility] PLEASE REVIEW. See PACEART report under Cardiology tab.

## 2018-03-13 ENCOUNTER — NURSE ONLY (OUTPATIENT)
Dept: CARDIOLOGY CLINIC | Age: 28
End: 2018-03-13

## 2018-03-13 DIAGNOSIS — R00.0 SINUS TACHYCARDIA: ICD-10-CM

## 2018-03-13 DIAGNOSIS — Z45.09 ENCOUNTER FOR ELECTRONIC ANALYSIS OF REVEAL EVENT RECORDER: ICD-10-CM

## 2018-03-13 DIAGNOSIS — R55 SYNCOPE AND COLLAPSE: ICD-10-CM

## 2018-03-13 DIAGNOSIS — Z45.09 ENCOUNTER FOR LOOP RECORDER CHECK: Primary | ICD-10-CM

## 2018-04-11 ENCOUNTER — PROCEDURE VISIT (OUTPATIENT)
Dept: CARDIOLOGY CLINIC | Age: 28
End: 2018-04-11

## 2018-04-11 DIAGNOSIS — Z45.09 ENCOUNTER FOR ELECTRONIC ANALYSIS OF REVEAL EVENT RECORDER: ICD-10-CM

## 2018-04-11 DIAGNOSIS — Z45.09 ENCOUNTER FOR LOOP RECORDER CHECK: Primary | ICD-10-CM

## 2018-04-17 ENCOUNTER — NURSE ONLY (OUTPATIENT)
Dept: CARDIOLOGY CLINIC | Age: 28
End: 2018-04-17

## 2018-04-17 DIAGNOSIS — Z45.09 ENCOUNTER FOR ELECTRONIC ANALYSIS OF REVEAL EVENT RECORDER: ICD-10-CM

## 2018-04-17 DIAGNOSIS — R00.0 SINUS TACHYCARDIA: ICD-10-CM

## 2018-04-17 DIAGNOSIS — Z45.09 ENCOUNTER FOR LOOP RECORDER CHECK: Primary | ICD-10-CM

## 2018-04-17 DIAGNOSIS — R55 SYNCOPE AND COLLAPSE: ICD-10-CM

## 2018-04-17 PROCEDURE — 93298 REM INTERROG DEV EVAL SCRMS: CPT | Performed by: INTERNAL MEDICINE

## 2018-04-17 PROCEDURE — 93299 PR REM INTERROG ICPMS/SCRMS <30 D TECH REVIEW: CPT | Performed by: INTERNAL MEDICINE

## 2018-04-20 ENCOUNTER — PROCEDURE VISIT (OUTPATIENT)
Dept: CARDIOLOGY CLINIC | Age: 28
End: 2018-04-20

## 2018-04-20 DIAGNOSIS — Z45.09 ENCOUNTER FOR LOOP RECORDER CHECK: Primary | ICD-10-CM

## 2018-05-22 ENCOUNTER — NURSE ONLY (OUTPATIENT)
Dept: CARDIOLOGY CLINIC | Age: 28
End: 2018-05-22

## 2018-05-22 DIAGNOSIS — Z45.09 ENCOUNTER FOR ELECTRONIC ANALYSIS OF REVEAL EVENT RECORDER: Primary | ICD-10-CM

## 2018-05-22 DIAGNOSIS — R00.2 PALPITATIONS: ICD-10-CM

## 2018-05-22 PROCEDURE — 93298 REM INTERROG DEV EVAL SCRMS: CPT | Performed by: INTERNAL MEDICINE

## 2018-05-22 PROCEDURE — 93299 PR REM INTERROG ICPMS/SCRMS <30 D TECH REVIEW: CPT | Performed by: INTERNAL MEDICINE

## 2018-06-12 DIAGNOSIS — R00.0 SINUS TACHYCARDIA: Primary | ICD-10-CM

## 2018-06-13 RX ORDER — METOPROLOL SUCCINATE 25 MG/1
TABLET, EXTENDED RELEASE ORAL
Qty: 90 TABLET | Refills: 3 | Status: SHIPPED | OUTPATIENT
Start: 2018-06-13 | End: 2018-06-14 | Stop reason: SDUPTHER

## 2018-06-14 ENCOUNTER — TELEPHONE (OUTPATIENT)
Dept: CARDIOLOGY CLINIC | Age: 28
End: 2018-06-14

## 2018-06-14 DIAGNOSIS — R00.0 SINUS TACHYCARDIA: ICD-10-CM

## 2018-06-14 RX ORDER — METOPROLOL SUCCINATE 50 MG/1
25 TABLET, EXTENDED RELEASE ORAL 2 TIMES DAILY
Qty: 30 TABLET | Refills: 5 | Status: SHIPPED | OUTPATIENT
Start: 2018-06-14 | End: 2019-12-03

## 2018-06-14 RX ORDER — METOPROLOL SUCCINATE 25 MG/1
25 TABLET, EXTENDED RELEASE ORAL 2 TIMES DAILY
Qty: 60 TABLET | Refills: 5 | Status: SHIPPED | OUTPATIENT
Start: 2018-06-14 | End: 2018-06-14 | Stop reason: SDUPTHER

## 2018-06-26 ENCOUNTER — NURSE ONLY (OUTPATIENT)
Dept: CARDIOLOGY CLINIC | Age: 28
End: 2018-06-26

## 2018-06-26 DIAGNOSIS — Z45.09 ENCOUNTER FOR ELECTRONIC ANALYSIS OF REVEAL EVENT RECORDER: Primary | ICD-10-CM

## 2018-06-26 DIAGNOSIS — R00.0 SINUS TACHYCARDIA: ICD-10-CM

## 2018-06-26 PROCEDURE — 93298 REM INTERROG DEV EVAL SCRMS: CPT | Performed by: INTERNAL MEDICINE

## 2018-06-26 PROCEDURE — 93299 PR REM INTERROG ICPMS/SCRMS <30 D TECH REVIEW: CPT | Performed by: INTERNAL MEDICINE

## 2018-07-11 ENCOUNTER — PROCEDURE VISIT (OUTPATIENT)
Dept: CARDIOLOGY CLINIC | Age: 28
End: 2018-07-11

## 2018-07-11 DIAGNOSIS — Z45.09 ENCOUNTER FOR ELECTRONIC ANALYSIS OF REVEAL EVENT RECORDER: ICD-10-CM

## 2018-07-11 NOTE — PROGRESS NOTES
See PACEART report under Cardiology tab. Carelink/loop recorder transmission. Symptomatic tachycardia recorded on 7/10/18 x 28 seconds with rates 158-162 bpm. She is on Toprol XL 50mg bid.

## 2018-07-25 ENCOUNTER — PROCEDURE VISIT (OUTPATIENT)
Dept: CARDIOLOGY CLINIC | Age: 28
End: 2018-07-25

## 2018-07-25 DIAGNOSIS — Z45.09 ENCOUNTER FOR ELECTRONIC ANALYSIS OF REVEAL EVENT RECORDER: ICD-10-CM

## 2018-07-25 NOTE — PROGRESS NOTES
Remote interrogation of implanted cardiac event monitor shows normal function. Since last carelink 7/11/18: symptom episode 7/18/18-NSR.  2 tachy episodes on 7/19/18 lasting < 30 seconds at rates of 167 bpm.  Will print report for RPS to review.   NO CHARGE

## 2018-07-31 ENCOUNTER — NURSE ONLY (OUTPATIENT)
Dept: CARDIOLOGY CLINIC | Age: 28
End: 2018-07-31

## 2018-07-31 DIAGNOSIS — R00.2 PALPITATIONS: ICD-10-CM

## 2018-07-31 DIAGNOSIS — Z45.09 ENCOUNTER FOR ELECTRONIC ANALYSIS OF REVEAL EVENT RECORDER: Primary | ICD-10-CM

## 2018-08-25 DIAGNOSIS — R12 HEARTBURN: ICD-10-CM

## 2018-08-28 RX ORDER — PANTOPRAZOLE SODIUM 40 MG/1
TABLET, DELAYED RELEASE ORAL
Qty: 30 TABLET | Refills: 1 | Status: SHIPPED | OUTPATIENT
Start: 2018-08-28 | End: 2018-09-27 | Stop reason: CLARIF

## 2018-08-30 ENCOUNTER — TELEPHONE (OUTPATIENT)
Dept: PRIMARY CARE CLINIC | Age: 28
End: 2018-08-30

## 2018-09-18 ENCOUNTER — NURSE ONLY (OUTPATIENT)
Dept: CARDIOLOGY CLINIC | Age: 28
End: 2018-09-18

## 2018-09-18 DIAGNOSIS — R00.2 PALPITATIONS: ICD-10-CM

## 2018-09-18 DIAGNOSIS — Z45.09 ENCOUNTER FOR ELECTRONIC ANALYSIS OF REVEAL EVENT RECORDER: Primary | ICD-10-CM

## 2018-09-18 PROCEDURE — 93298 REM INTERROG DEV EVAL SCRMS: CPT | Performed by: INTERNAL MEDICINE

## 2018-09-18 PROCEDURE — 93299 PR REM INTERROG ICPMS/SCRMS <30 D TECH REVIEW: CPT | Performed by: INTERNAL MEDICINE

## 2018-09-27 ENCOUNTER — OFFICE VISIT (OUTPATIENT)
Dept: INTERNAL MEDICINE CLINIC | Age: 28
End: 2018-09-27

## 2018-09-27 VITALS
DIASTOLIC BLOOD PRESSURE: 75 MMHG | SYSTOLIC BLOOD PRESSURE: 135 MMHG | BODY MASS INDEX: 30.12 KG/M2 | WEIGHT: 170 LBS | HEART RATE: 70 BPM | RESPIRATION RATE: 18 BRPM | HEIGHT: 63 IN

## 2018-09-27 DIAGNOSIS — Z76.89 ENCOUNTER TO ESTABLISH CARE: Primary | ICD-10-CM

## 2018-09-27 DIAGNOSIS — M25.552 LEFT HIP PAIN: ICD-10-CM

## 2018-09-27 DIAGNOSIS — Z00.00 ANNUAL PHYSICAL EXAM: ICD-10-CM

## 2018-09-27 DIAGNOSIS — M54.50 CHRONIC MIDLINE LOW BACK PAIN WITHOUT SCIATICA: ICD-10-CM

## 2018-09-27 DIAGNOSIS — G89.29 CHRONIC MIDLINE LOW BACK PAIN WITHOUT SCIATICA: ICD-10-CM

## 2018-09-27 DIAGNOSIS — R00.0 SINUS TACHYCARDIA: ICD-10-CM

## 2018-09-27 DIAGNOSIS — N63.0 BREAST NODULE: ICD-10-CM

## 2018-09-27 PROBLEM — R41.3 MEMORY LOSS: Status: RESOLVED | Noted: 2017-05-08 | Resolved: 2018-09-27

## 2018-09-27 PROBLEM — R56.9 NEW ONSET SEIZURE (HCC): Status: RESOLVED | Noted: 2017-05-08 | Resolved: 2018-09-27

## 2018-09-27 PROBLEM — R10.2 ADNEXAL PAIN: Status: RESOLVED | Noted: 2017-01-23 | Resolved: 2018-09-27

## 2018-09-27 PROCEDURE — 99202 OFFICE O/P NEW SF 15 MIN: CPT | Performed by: INTERNAL MEDICINE

## 2018-09-27 PROCEDURE — 81002 URINALYSIS NONAUTO W/O SCOPE: CPT | Performed by: INTERNAL MEDICINE

## 2018-09-27 RX ORDER — MELOXICAM 15 MG/1
15 TABLET ORAL DAILY
Qty: 15 TABLET | Refills: 0 | Status: SHIPPED | OUTPATIENT
Start: 2018-09-27 | End: 2018-10-02 | Stop reason: ALTCHOICE

## 2018-09-27 ASSESSMENT — PATIENT HEALTH QUESTIONNAIRE - PHQ9
SUM OF ALL RESPONSES TO PHQ QUESTIONS 1-9: 0
SUM OF ALL RESPONSES TO PHQ QUESTIONS 1-9: 0
2. FEELING DOWN, DEPRESSED OR HOPELESS: 0
SUM OF ALL RESPONSES TO PHQ9 QUESTIONS 1 & 2: 0
1. LITTLE INTEREST OR PLEASURE IN DOING THINGS: 0

## 2018-09-27 ASSESSMENT — ENCOUNTER SYMPTOMS
TROUBLE SWALLOWING: 0
NAUSEA: 0
COLOR CHANGE: 0
PHOTOPHOBIA: 0
SHORTNESS OF BREATH: 0
ABDOMINAL PAIN: 0
CHEST TIGHTNESS: 0
CONSTIPATION: 0
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
## 2018-09-28 ASSESSMENT — ENCOUNTER SYMPTOMS: BACK PAIN: 1

## 2018-10-02 ENCOUNTER — TELEPHONE (OUTPATIENT)
Dept: INTERNAL MEDICINE CLINIC | Age: 28
End: 2018-10-02

## 2018-10-02 DIAGNOSIS — R26.2 UNABLE TO AMBULATE: ICD-10-CM

## 2018-10-02 DIAGNOSIS — G89.29 OTHER CHRONIC PAIN: Primary | ICD-10-CM

## 2018-10-02 RX ORDER — CELECOXIB 100 MG/1
100 CAPSULE ORAL 2 TIMES DAILY
Qty: 60 CAPSULE | Refills: 0 | Status: SHIPPED | OUTPATIENT
Start: 2018-10-02 | End: 2018-11-17

## 2018-10-03 ENCOUNTER — TELEPHONE (OUTPATIENT)
Dept: INTERNAL MEDICINE CLINIC | Age: 28
End: 2018-10-03

## 2018-10-09 ENCOUNTER — TELEPHONE (OUTPATIENT)
Dept: INTERNAL MEDICINE CLINIC | Age: 28
End: 2018-10-09

## 2018-10-09 ENCOUNTER — HOSPITAL ENCOUNTER (OUTPATIENT)
Dept: MRI IMAGING | Age: 28
Discharge: HOME | End: 2018-10-09
Payer: COMMERCIAL

## 2018-10-09 DIAGNOSIS — M25.552 LEFT HIP PAIN: Primary | ICD-10-CM

## 2018-10-09 DIAGNOSIS — R26.2 UNABLE TO AMBULATE: ICD-10-CM

## 2018-10-09 DIAGNOSIS — G89.29 OTHER CHRONIC PAIN: ICD-10-CM

## 2018-10-09 PROCEDURE — 73721 MRI JNT OF LWR EXTRE W/O DYE: CPT

## 2018-10-09 RX ORDER — TIZANIDINE 4 MG/1
2 TABLET ORAL EVERY 8 HOURS PRN
Qty: 30 TABLET | Refills: 0 | Status: SHIPPED | OUTPATIENT
Start: 2018-10-09 | End: 2019-02-28 | Stop reason: ALTCHOICE

## 2018-10-11 ENCOUNTER — TELEPHONE (OUTPATIENT)
Dept: INTERNAL MEDICINE CLINIC | Age: 28
End: 2018-10-11

## 2018-10-12 ENCOUNTER — TELEPHONE (OUTPATIENT)
Dept: INTERNAL MEDICINE CLINIC | Age: 28
End: 2018-10-12

## 2018-10-12 DIAGNOSIS — M25.559 ARTHRALGIA OF HIP, UNSPECIFIED LATERALITY: Primary | ICD-10-CM

## 2018-11-17 ENCOUNTER — HOSPITAL ENCOUNTER (EMERGENCY)
Age: 28
Discharge: HOME | End: 2018-11-17
Attending: EMERGENCY MEDICINE
Payer: COMMERCIAL

## 2018-11-17 VITALS
OXYGEN SATURATION: 100 % | RESPIRATION RATE: 16 BRPM | BODY MASS INDEX: 31.1 KG/M2 | DIASTOLIC BLOOD PRESSURE: 66 MMHG | WEIGHT: 169 LBS | SYSTOLIC BLOOD PRESSURE: 116 MMHG | HEART RATE: 96 BPM | TEMPERATURE: 97.3 F | HEIGHT: 62 IN

## 2018-11-17 DIAGNOSIS — G56.01 CARPAL TUNNEL SYNDROME OF RIGHT WRIST: Primary | ICD-10-CM

## 2018-11-17 PROCEDURE — 96372 THER/PROPH/DIAG INJ SC/IM: CPT

## 2018-11-17 PROCEDURE — 6360000002 HC RX W HCPCS: Performed by: EMERGENCY MEDICINE

## 2018-11-17 PROCEDURE — 6370000000 HC RX 637 (ALT 250 FOR IP): Performed by: EMERGENCY MEDICINE

## 2018-11-17 PROCEDURE — 99283 EMERGENCY DEPT VISIT LOW MDM: CPT

## 2018-11-17 RX ORDER — PREDNISONE 20 MG/1
40 TABLET ORAL ONCE
Status: COMPLETED | OUTPATIENT
Start: 2018-11-17 | End: 2018-11-17

## 2018-11-17 RX ORDER — PREDNISONE 50 MG/1
50 TABLET ORAL DAILY
Qty: 5 TABLET | Refills: 0 | Status: SHIPPED | OUTPATIENT
Start: 2018-11-17 | End: 2018-11-22

## 2018-11-17 RX ORDER — KETOROLAC TROMETHAMINE 30 MG/ML
30 INJECTION, SOLUTION INTRAMUSCULAR; INTRAVENOUS ONCE
Status: COMPLETED | OUTPATIENT
Start: 2018-11-17 | End: 2018-11-17

## 2018-11-17 RX ADMIN — KETOROLAC TROMETHAMINE 30 MG: 30 INJECTION, SOLUTION INTRAMUSCULAR at 20:59

## 2018-11-17 RX ADMIN — PREDNISONE 40 MG: 20 TABLET ORAL at 20:59

## 2018-11-17 ASSESSMENT — PAIN SCALES - GENERAL: PAINLEVEL_OUTOF10: 10

## 2018-11-18 NOTE — ED PROVIDER NOTES
Emergency Department Attending Note    Lacey Billingsley MD    Date of ED VIsit: 11/17/2018    CHIEF COMPLAINT  No chief complaint on file. HISTORY OF PRESENT ILLNESS  Oumou Nava is a 33 y.o. female  With Vital signs of LMP 08/07/2017 Comment: hyst 08/2017 who presents to the ED with a complaint of right wrist and hand pain. Patient seen and evaluated in room 6. Patient states that she was moving a lot of boxes and stuff yesterday while she was moving her house and also was such cleaning her [employer] today which is a significant increase in her activities. She's previously diagnosed with carpal tunnel syndrome and states that this morning she woke up with pain in the palmar aspect of her hand. She does not report any loss of function. She does state that she couldn't grab the vacuum at [employer] today because of the pain. She denies any fevers or chills no nausea vomiting diarrhea constipation chest pain shortness of breath. .  No other complaints, modifying factors or associated symptoms. I have reviewed the following from the nursing documentation. Past Medical History:   Diagnosis Date    Anemia     Currently on Iron PO    Anxiety     Heart abnormalities     \"heart flutters real fast\"    Miscarriage 2009    Ovarian cyst     Tricuspid regurgitation      Past Surgical History:   Procedure Laterality Date    APPENDECTOMY  2006    ENDOMETRIAL ABLATION  02/01/2017    With Tubal ligation    INSERTABLE CARDIAC MONITOR  05/24/2017    Loop Recorder Left Chest    OVARIAN CYST SURGERY       Family History   Problem Relation Age of Onset    Cancer Maternal Grandmother         Thyroid    Cancer Paternal Grandmother     Arthritis Mother     Mental Retardation Maternal Uncle      Social History     Social History    Marital status:      Spouse name: N/A    Number of children: N/A    Years of education: N/A     Occupational History    Not on file.      Social History Main Topics    Smoking

## 2018-11-30 ENCOUNTER — OFFICE VISIT (OUTPATIENT)
Dept: ORTHOPEDIC SURGERY | Age: 28
End: 2018-11-30
Payer: COMMERCIAL

## 2018-11-30 VITALS — WEIGHT: 169.09 LBS | HEIGHT: 62 IN | BODY MASS INDEX: 31.12 KG/M2

## 2018-11-30 DIAGNOSIS — R20.0 BILATERAL HAND NUMBNESS: Primary | ICD-10-CM

## 2018-11-30 PROCEDURE — 99213 OFFICE O/P EST LOW 20 MIN: CPT | Performed by: ORTHOPAEDIC SURGERY

## 2018-11-30 PROCEDURE — G8417 CALC BMI ABV UP PARAM F/U: HCPCS | Performed by: ORTHOPAEDIC SURGERY

## 2018-11-30 PROCEDURE — G8484 FLU IMMUNIZE NO ADMIN: HCPCS | Performed by: ORTHOPAEDIC SURGERY

## 2018-11-30 PROCEDURE — 1036F TOBACCO NON-USER: CPT | Performed by: ORTHOPAEDIC SURGERY

## 2018-11-30 PROCEDURE — G8428 CUR MEDS NOT DOCUMENT: HCPCS | Performed by: ORTHOPAEDIC SURGERY

## 2018-11-30 NOTE — PROGRESS NOTES
and assess severity. Follow-up after EMG testing to discuss results and treatment options  All questions and concerns were addressed today. Patient is in agreement with the plan. Ghada Taylor MD  Hand & Upper Extremity Surgery  [address]  A partner of [facility] ([facility])        Please note that this transcription was created using voice recognition software. Any errors are unintentional and may be due to voice recognition transcription.

## 2018-12-04 ENCOUNTER — NURSE ONLY (OUTPATIENT)
Dept: CARDIOLOGY CLINIC | Age: 28
End: 2018-12-04
Payer: COMMERCIAL

## 2018-12-04 DIAGNOSIS — R00.0 SINUS TACHYCARDIA: ICD-10-CM

## 2018-12-04 DIAGNOSIS — R55 SYNCOPE AND COLLAPSE: ICD-10-CM

## 2018-12-04 DIAGNOSIS — Z45.09 ENCOUNTER FOR LOOP RECORDER CHECK: ICD-10-CM

## 2018-12-04 PROCEDURE — 93299 PR REM INTERROG ICPMS/SCRMS <30 D TECH REVIEW: CPT | Performed by: INTERNAL MEDICINE

## 2018-12-04 PROCEDURE — 93298 REM INTERROG DEV EVAL SCRMS: CPT | Performed by: INTERNAL MEDICINE

## 2018-12-04 NOTE — LETTER
[address] [facility] [phone]  [address] [place], [place] [address] [address] [phone]    Pacemaker/Defibrillator Clinic          12/06/18        Sharona Coleman 44  [address] [facility]        Dear Sharona Nobles    This letter is to inform you that we received the transmission from your monitor at home that checks your pacemaker and/or defibrillator, or implanted heart monitor. The next date your monitor will automatically transmit will be 1/8/19. Please do not send additional routine transmissions unless specifically requested. Your device and monitor are wireless and most transmit cellularly, but please periodically check your monitor is still plugged in to the electrical outlet. If you still use the telephone land line to send please ensure the connection to the phone katy is secure. This will help to ensure successful automatic transmissions in the future. Also, the monitor needs to be close to you while sleeping at night. Please be aware that the remote device transmission sites are periodically monitored only during regular business hours during which simultaneous in-office device clinics are being run. If your transmission requires attention, we will contact you as soon as possible. Thank you.             [facility]

## 2018-12-06 NOTE — PROGRESS NOTES
Carelink transmission shows normal Linq/ILR function. Battery function stable. No symptoms recorded. Tach episodes look like sinus tach as the rate variation is gradual as opposed to abrupt. There is no abrupt onset or offset on this transmission. See Paceart report under cardiology tab. Recheck 1 mos.  mk

## 2019-01-08 ENCOUNTER — NURSE ONLY (OUTPATIENT)
Dept: CARDIOLOGY CLINIC | Age: 29
End: 2019-01-08
Payer: COMMERCIAL

## 2019-01-08 DIAGNOSIS — Z45.09 ENCOUNTER FOR ELECTRONIC ANALYSIS OF REVEAL EVENT RECORDER: Primary | ICD-10-CM

## 2019-01-08 DIAGNOSIS — R00.2 PALPITATIONS: ICD-10-CM

## 2019-01-08 PROCEDURE — 93298 REM INTERROG DEV EVAL SCRMS: CPT | Performed by: INTERNAL MEDICINE

## 2019-01-08 PROCEDURE — 93299 PR REM INTERROG ICPMS/SCRMS <30 D TECH REVIEW: CPT | Performed by: INTERNAL MEDICINE

## 2019-02-21 ENCOUNTER — TELEPHONE (OUTPATIENT)
Dept: INTERNAL MEDICINE CLINIC | Age: 29
End: 2019-02-21

## 2019-02-26 ENCOUNTER — NURSE ONLY (OUTPATIENT)
Dept: CARDIOLOGY CLINIC | Age: 29
End: 2019-02-26
Payer: COMMERCIAL

## 2019-02-26 DIAGNOSIS — R00.0 SINUS TACHYCARDIA: ICD-10-CM

## 2019-02-26 DIAGNOSIS — Z45.09 ENCOUNTER FOR ELECTRONIC ANALYSIS OF REVEAL EVENT RECORDER: Primary | ICD-10-CM

## 2019-02-26 DIAGNOSIS — R55 SYNCOPE AND COLLAPSE: ICD-10-CM

## 2019-02-26 PROCEDURE — 93299 PR REM INTERROG ICPMS/SCRMS <30 D TECH REVIEW: CPT | Performed by: INTERNAL MEDICINE

## 2019-02-26 PROCEDURE — 93298 REM INTERROG DEV EVAL SCRMS: CPT | Performed by: INTERNAL MEDICINE

## 2019-02-28 ENCOUNTER — OFFICE VISIT (OUTPATIENT)
Dept: INTERNAL MEDICINE CLINIC | Age: 29
End: 2019-02-28

## 2019-02-28 VITALS
HEIGHT: 62 IN | DIASTOLIC BLOOD PRESSURE: 75 MMHG | HEART RATE: 70 BPM | WEIGHT: 180 LBS | SYSTOLIC BLOOD PRESSURE: 125 MMHG | BODY MASS INDEX: 33.13 KG/M2 | RESPIRATION RATE: 18 BRPM

## 2019-02-28 DIAGNOSIS — M25.552 LEFT HIP PAIN: ICD-10-CM

## 2019-02-28 DIAGNOSIS — M54.50 CHRONIC BILATERAL LOW BACK PAIN WITHOUT SCIATICA: Primary | ICD-10-CM

## 2019-02-28 DIAGNOSIS — G89.29 CHRONIC BILATERAL LOW BACK PAIN WITHOUT SCIATICA: Primary | ICD-10-CM

## 2019-02-28 PROCEDURE — 99213 OFFICE O/P EST LOW 20 MIN: CPT | Performed by: INTERNAL MEDICINE

## 2019-02-28 RX ORDER — BACLOFEN 5 MG/1
5 TABLET ORAL 2 TIMES DAILY
Qty: 30 TABLET | Refills: 0 | Status: SHIPPED | OUTPATIENT
Start: 2019-02-28 | End: 2019-04-30

## 2019-02-28 ASSESSMENT — ENCOUNTER SYMPTOMS
ABDOMINAL PAIN: 0
TROUBLE SWALLOWING: 0
SHORTNESS OF BREATH: 0
CHEST TIGHTNESS: 0
COLOR CHANGE: 0
PHOTOPHOBIA: 0
CONSTIPATION: 0
BACK PAIN: 1
NAUSEA: 0

## 2019-03-01 ENCOUNTER — TELEPHONE (OUTPATIENT)
Dept: INTERNAL MEDICINE CLINIC | Age: 29
End: 2019-03-01

## 2019-03-08 ENCOUNTER — TELEPHONE (OUTPATIENT)
Dept: INTERNAL MEDICINE CLINIC | Age: 29
End: 2019-03-08

## 2019-03-08 RX ORDER — ONDANSETRON 4 MG/1
4 TABLET, FILM COATED ORAL 3 TIMES DAILY PRN
Qty: 20 TABLET | Refills: 0 | Status: SHIPPED | OUTPATIENT
Start: 2019-03-08 | End: 2019-04-30

## 2019-03-11 ENCOUNTER — HOSPITAL ENCOUNTER (EMERGENCY)
Age: 29
Discharge: HOME | End: 2019-03-11
Payer: COMMERCIAL

## 2019-03-11 ENCOUNTER — APPOINTMENT (OUTPATIENT)
Dept: GENERAL RADIOLOGY | Age: 29
End: 2019-03-11
Payer: COMMERCIAL

## 2019-03-11 VITALS
OXYGEN SATURATION: 96 % | TEMPERATURE: 99.5 F | SYSTOLIC BLOOD PRESSURE: 137 MMHG | WEIGHT: 179 LBS | HEIGHT: 62 IN | HEART RATE: 104 BPM | RESPIRATION RATE: 20 BRPM | DIASTOLIC BLOOD PRESSURE: 88 MMHG | BODY MASS INDEX: 32.94 KG/M2

## 2019-03-11 DIAGNOSIS — J10.1 INFLUENZA A: Primary | ICD-10-CM

## 2019-03-11 LAB
BILIRUBIN URINE: NEGATIVE
BLOOD, URINE: NEGATIVE
CLARITY: CLEAR
COLOR: YELLOW
GLUCOSE URINE: NEGATIVE MG/DL
HCG(URINE) PREGNANCY TEST: NEGATIVE
KETONES, URINE: NEGATIVE MG/DL
LEUKOCYTE ESTERASE, URINE: NEGATIVE
MICROSCOPIC EXAMINATION: NORMAL
NITRITE, URINE: NEGATIVE
PH UA: 5.5 (ref 5–8)
PROTEIN UA: NEGATIVE MG/DL
RAPID INFLUENZA  B AGN: NEGATIVE
RAPID INFLUENZA A AGN: POSITIVE
S PYO AG THROAT QL: NEGATIVE
SPECIFIC GRAVITY UA: 1.02 (ref 1–1.03)
URINE REFLEX TO CULTURE: NORMAL
URINE TYPE: NORMAL
UROBILINOGEN, URINE: 0.2 E.U./DL

## 2019-03-11 PROCEDURE — 84703 CHORIONIC GONADOTROPIN ASSAY: CPT

## 2019-03-11 PROCEDURE — 6370000000 HC RX 637 (ALT 250 FOR IP): Performed by: PHYSICIAN ASSISTANT

## 2019-03-11 PROCEDURE — 81003 URINALYSIS AUTO W/O SCOPE: CPT

## 2019-03-11 PROCEDURE — 87804 INFLUENZA ASSAY W/OPTIC: CPT

## 2019-03-11 PROCEDURE — 99283 EMERGENCY DEPT VISIT LOW MDM: CPT

## 2019-03-11 PROCEDURE — 71046 X-RAY EXAM CHEST 2 VIEWS: CPT

## 2019-03-11 PROCEDURE — 87880 STREP A ASSAY W/OPTIC: CPT

## 2019-03-11 PROCEDURE — 87081 CULTURE SCREEN ONLY: CPT

## 2019-03-11 RX ORDER — PROMETHAZINE HYDROCHLORIDE AND CODEINE PHOSPHATE 6.25; 1 MG/5ML; MG/5ML
5 SYRUP ORAL 4 TIMES DAILY PRN
Qty: 100 ML | Refills: 0 | Status: SHIPPED | OUTPATIENT
Start: 2019-03-11 | End: 2019-03-21 | Stop reason: SDUPTHER

## 2019-03-11 RX ORDER — ONDANSETRON 4 MG/1
4 TABLET, ORALLY DISINTEGRATING ORAL ONCE
Status: COMPLETED | OUTPATIENT
Start: 2019-03-11 | End: 2019-03-11

## 2019-03-11 RX ORDER — ONDANSETRON 4 MG/1
4 TABLET, ORALLY DISINTEGRATING ORAL EVERY 8 HOURS PRN
Qty: 10 TABLET | Refills: 0 | Status: SHIPPED | OUTPATIENT
Start: 2019-03-11 | End: 2019-04-30

## 2019-03-11 RX ORDER — IBUPROFEN 600 MG/1
600 TABLET ORAL ONCE
Status: COMPLETED | OUTPATIENT
Start: 2019-03-11 | End: 2019-03-11

## 2019-03-11 RX ORDER — ACETAMINOPHEN 325 MG/1
650 TABLET ORAL ONCE
Status: COMPLETED | OUTPATIENT
Start: 2019-03-11 | End: 2019-03-11

## 2019-03-11 RX ADMIN — IBUPROFEN 600 MG: 600 TABLET, FILM COATED ORAL at 21:19

## 2019-03-11 RX ADMIN — ONDANSETRON 4 MG: 4 TABLET, ORALLY DISINTEGRATING ORAL at 21:19

## 2019-03-11 RX ADMIN — ACETAMINOPHEN 650 MG: 325 TABLET ORAL at 21:18

## 2019-03-11 ASSESSMENT — PAIN DESCRIPTION - FREQUENCY: FREQUENCY: CONTINUOUS

## 2019-03-11 ASSESSMENT — PAIN - FUNCTIONAL ASSESSMENT: PAIN_FUNCTIONAL_ASSESSMENT: ACTIVITIES ARE NOT PREVENTED

## 2019-03-11 ASSESSMENT — PAIN DESCRIPTION - LOCATION: LOCATION: GENERALIZED

## 2019-03-11 ASSESSMENT — PAIN DESCRIPTION - DESCRIPTORS: DESCRIPTORS: ACHING

## 2019-03-11 ASSESSMENT — PAIN DESCRIPTION - ONSET: ONSET: SUDDEN

## 2019-03-11 ASSESSMENT — PAIN SCALES - GENERAL
PAINLEVEL_OUTOF10: 6
PAINLEVEL_OUTOF10: 6

## 2019-03-11 ASSESSMENT — PAIN DESCRIPTION - PAIN TYPE: TYPE: ACUTE PAIN

## 2019-03-11 ASSESSMENT — PAIN DESCRIPTION - PROGRESSION: CLINICAL_PROGRESSION: NOT CHANGED

## 2019-03-12 ASSESSMENT — ENCOUNTER SYMPTOMS
NAUSEA: 1
COUGH: 1
SORE THROAT: 1
FLU SYMPTOMS: 1

## 2019-03-14 LAB — S PYO THROAT QL CULT: NORMAL

## 2019-03-21 DIAGNOSIS — J10.1 INFLUENZA A: ICD-10-CM

## 2019-03-21 RX ORDER — PROMETHAZINE HYDROCHLORIDE AND CODEINE PHOSPHATE 6.25; 1 MG/5ML; MG/5ML
5 SYRUP ORAL 4 TIMES DAILY PRN
Qty: 100 ML | Refills: 0 | Status: SHIPPED | OUTPATIENT
Start: 2019-03-21 | End: 2019-03-26

## 2019-04-02 ENCOUNTER — NURSE ONLY (OUTPATIENT)
Dept: CARDIOLOGY CLINIC | Age: 29
End: 2019-04-02
Payer: COMMERCIAL

## 2019-04-02 DIAGNOSIS — Z45.09 ENCOUNTER FOR ELECTRONIC ANALYSIS OF REVEAL EVENT RECORDER: ICD-10-CM

## 2019-04-02 DIAGNOSIS — R00.0 SINUS TACHYCARDIA: ICD-10-CM

## 2019-04-02 DIAGNOSIS — R55 SYNCOPE AND COLLAPSE: ICD-10-CM

## 2019-04-02 PROCEDURE — 93298 REM INTERROG DEV EVAL SCRMS: CPT | Performed by: INTERNAL MEDICINE

## 2019-04-02 PROCEDURE — 93299 PR REM INTERROG ICPMS/SCRMS <30 D TECH REVIEW: CPT | Performed by: INTERNAL MEDICINE

## 2019-04-02 NOTE — LETTER
[address] [phone]  Luige Eder [address] [facility] [phone]    Pacemaker/Defibrillator Clinic          04/02/19        Karthikeyan Jaramillo  [address] [address]        Dear Karthikeyan Jaramillo    This letter is to inform you that we received the transmission from your monitor at home that checks your pacemaker and/or defibrillator, or implanted heart monitor. The next date your monitor will automatically transmit will be 5/7/19. Please do not send additional routine transmissions unless specifically requested. Your device and monitor are wireless and most transmit cellularly, but please periodically check your monitor is still plugged in to the electrical outlet. If you still use the telephone land line to send please ensure the connection to the phone katy is secure. This will help to ensure successful automatic transmissions in the future. Also, the monitor needs to be close to you while sleeping at night. Please be aware that the remote device transmission sites are periodically monitored only during regular business hours during which simultaneous in-office device clinics are being run. If your transmission requires attention, we will contact you as soon as possible. Thank you.             Ok [address]

## 2019-04-02 NOTE — PROGRESS NOTES
Remote interrogation of implanted cardiac event monitor shows normal function. Dx syncope, palpitations, ST.  Observations Summary 09-Jan-2019 00:05 to 02-Apr-2019 00:05  3 SYMPTOM-2 EGM's to view-appear to be SR/ST-no ectopy seen, artifact noted. 114 TACHY-asymptomatic, appear to be ST, no abrupt inclines/declines in heart rate noted. Follow up 1 month via [employer].

## 2019-04-30 ENCOUNTER — HOSPITAL ENCOUNTER (EMERGENCY)
Age: 29
Discharge: HOME | End: 2019-04-30
Attending: EMERGENCY MEDICINE
Payer: COMMERCIAL

## 2019-04-30 ENCOUNTER — APPOINTMENT (OUTPATIENT)
Dept: CT IMAGING | Age: 29
End: 2019-04-30
Payer: COMMERCIAL

## 2019-04-30 VITALS
SYSTOLIC BLOOD PRESSURE: 153 MMHG | BODY MASS INDEX: 33.13 KG/M2 | WEIGHT: 180 LBS | HEIGHT: 62 IN | RESPIRATION RATE: 16 BRPM | TEMPERATURE: 98.2 F | HEART RATE: 87 BPM | OXYGEN SATURATION: 99 % | DIASTOLIC BLOOD PRESSURE: 95 MMHG

## 2019-04-30 DIAGNOSIS — G89.29 ACUTE EXACERBATION OF CHRONIC LOW BACK PAIN: Primary | ICD-10-CM

## 2019-04-30 DIAGNOSIS — M54.50 ACUTE EXACERBATION OF CHRONIC LOW BACK PAIN: Primary | ICD-10-CM

## 2019-04-30 LAB
A/G RATIO: 1.4 (ref 1.1–2.2)
ALBUMIN SERPL-MCNC: 4.6 G/DL (ref 3.4–5)
ALP BLD-CCNC: 77 U/L (ref 40–129)
ALT SERPL-CCNC: 90 U/L (ref 10–40)
ANION GAP SERPL CALCULATED.3IONS-SCNC: 11 MMOL/L (ref 3–16)
AST SERPL-CCNC: 61 U/L (ref 15–37)
BASOPHILS ABSOLUTE: 0.1 K/UL (ref 0–0.2)
BASOPHILS RELATIVE PERCENT: 1 %
BILIRUB SERPL-MCNC: 0.3 MG/DL (ref 0–1)
BILIRUBIN URINE: NEGATIVE
BLOOD, URINE: NEGATIVE
BUN BLDV-MCNC: 12 MG/DL (ref 7–20)
CALCIUM SERPL-MCNC: 10 MG/DL (ref 8.3–10.6)
CHLORIDE BLD-SCNC: 102 MMOL/L (ref 99–110)
CLARITY: ABNORMAL
CO2: 29 MMOL/L (ref 21–32)
COLOR: YELLOW
CREAT SERPL-MCNC: 0.6 MG/DL (ref 0.6–1.1)
EOSINOPHILS ABSOLUTE: 0.1 K/UL (ref 0–0.6)
EOSINOPHILS RELATIVE PERCENT: 1 %
GFR AFRICAN AMERICAN: >60
GFR NON-AFRICAN AMERICAN: >60
GLOBULIN: 3.3 G/DL
GLUCOSE BLD-MCNC: 117 MG/DL (ref 70–99)
GLUCOSE URINE: NEGATIVE MG/DL
HCG(URINE) PREGNANCY TEST: NEGATIVE
HCT VFR BLD CALC: 40 % (ref 36–48)
HEMOGLOBIN: 13.6 G/DL (ref 12–16)
KETONES, URINE: NEGATIVE MG/DL
LEUKOCYTE ESTERASE, URINE: NEGATIVE
LIPASE: 33 U/L (ref 13–60)
LYMPHOCYTES ABSOLUTE: 2.3 K/UL (ref 1–5.1)
LYMPHOCYTES RELATIVE PERCENT: 25.8 %
MCH RBC QN AUTO: 31.5 PG (ref 26–34)
MCHC RBC AUTO-ENTMCNC: 34 G/DL (ref 31–36)
MCV RBC AUTO: 92.9 FL (ref 80–100)
MICROSCOPIC EXAMINATION: ABNORMAL
MONOCYTES ABSOLUTE: 0.6 K/UL (ref 0–1.3)
MONOCYTES RELATIVE PERCENT: 6.6 %
NEUTROPHILS ABSOLUTE: 5.9 K/UL (ref 1.7–7.7)
NEUTROPHILS RELATIVE PERCENT: 65.6 %
NITRITE, URINE: NEGATIVE
PDW BLD-RTO: 12.5 % (ref 12.4–15.4)
PH UA: 5.5 (ref 5–8)
PLATELET # BLD: 286 K/UL (ref 135–450)
PMV BLD AUTO: 8.6 FL (ref 5–10.5)
POTASSIUM SERPL-SCNC: 4.1 MMOL/L (ref 3.5–5.1)
PROTEIN UA: NEGATIVE MG/DL
RBC # BLD: 4.31 M/UL (ref 4–5.2)
SODIUM BLD-SCNC: 142 MMOL/L (ref 136–145)
SPECIFIC GRAVITY UA: 1.02 (ref 1–1.03)
TOTAL PROTEIN: 7.9 G/DL (ref 6.4–8.2)
URINE TYPE: ABNORMAL
UROBILINOGEN, URINE: 0.2 E.U./DL
WBC # BLD: 9 K/UL (ref 4–11)

## 2019-04-30 PROCEDURE — 99283 EMERGENCY DEPT VISIT LOW MDM: CPT

## 2019-04-30 PROCEDURE — 83690 ASSAY OF LIPASE: CPT

## 2019-04-30 PROCEDURE — 85025 COMPLETE CBC W/AUTO DIFF WBC: CPT

## 2019-04-30 PROCEDURE — 2580000003 HC RX 258: Performed by: EMERGENCY MEDICINE

## 2019-04-30 PROCEDURE — 96376 TX/PRO/DX INJ SAME DRUG ADON: CPT

## 2019-04-30 PROCEDURE — 96374 THER/PROPH/DIAG INJ IV PUSH: CPT

## 2019-04-30 PROCEDURE — 87086 URINE CULTURE/COLONY COUNT: CPT

## 2019-04-30 PROCEDURE — 96375 TX/PRO/DX INJ NEW DRUG ADDON: CPT

## 2019-04-30 PROCEDURE — 80053 COMPREHEN METABOLIC PANEL: CPT

## 2019-04-30 PROCEDURE — 36415 COLL VENOUS BLD VENIPUNCTURE: CPT

## 2019-04-30 PROCEDURE — 74176 CT ABD & PELVIS W/O CONTRAST: CPT

## 2019-04-30 PROCEDURE — 84703 CHORIONIC GONADOTROPIN ASSAY: CPT

## 2019-04-30 PROCEDURE — 81003 URINALYSIS AUTO W/O SCOPE: CPT

## 2019-04-30 PROCEDURE — 6360000002 HC RX W HCPCS: Performed by: EMERGENCY MEDICINE

## 2019-04-30 RX ORDER — PREDNISONE 10 MG/1
10 TABLET ORAL SEE ADMIN INSTRUCTIONS
Qty: 30 TABLET | Refills: 0 | Status: SHIPPED | OUTPATIENT
Start: 2019-05-01 | End: 2019-09-16

## 2019-04-30 RX ORDER — KETOROLAC TROMETHAMINE 30 MG/ML
30 INJECTION, SOLUTION INTRAMUSCULAR; INTRAVENOUS ONCE
Status: COMPLETED | OUTPATIENT
Start: 2019-04-30 | End: 2019-04-30

## 2019-04-30 RX ORDER — ONDANSETRON 2 MG/ML
4 INJECTION INTRAMUSCULAR; INTRAVENOUS ONCE
Status: COMPLETED | OUTPATIENT
Start: 2019-04-30 | End: 2019-04-30

## 2019-04-30 RX ORDER — 0.9 % SODIUM CHLORIDE 0.9 %
1000 INTRAVENOUS SOLUTION INTRAVENOUS ONCE
Status: COMPLETED | OUTPATIENT
Start: 2019-04-30 | End: 2019-04-30

## 2019-04-30 RX ORDER — IBUPROFEN 600 MG/1
600 TABLET ORAL EVERY 6 HOURS PRN
Qty: 20 TABLET | Refills: 0 | Status: SHIPPED | OUTPATIENT
Start: 2019-04-30 | End: 2019-09-16

## 2019-04-30 RX ORDER — SODIUM CHLORIDE 9 MG/ML
INJECTION, SOLUTION INTRAVENOUS CONTINUOUS
Status: DISCONTINUED | OUTPATIENT
Start: 2019-04-30 | End: 2019-05-01 | Stop reason: HOSPADM

## 2019-04-30 RX ORDER — HYDROCODONE BITATRATE AND ACETAMINOPHEN 5; 325 MG/1; MG/1
1 TABLET ORAL EVERY 6 HOURS PRN
Qty: 10 TABLET | Refills: 0 | Status: SHIPPED | OUTPATIENT
Start: 2019-04-30 | End: 2019-05-07

## 2019-04-30 RX ADMIN — KETOROLAC TROMETHAMINE 30 MG: 30 INJECTION, SOLUTION INTRAMUSCULAR at 20:13

## 2019-04-30 RX ADMIN — ONDANSETRON 4 MG: 2 INJECTION INTRAMUSCULAR; INTRAVENOUS at 20:13

## 2019-04-30 RX ADMIN — SODIUM CHLORIDE 1000 ML: 9 INJECTION, SOLUTION INTRAVENOUS at 20:12

## 2019-04-30 RX ADMIN — HYDROMORPHONE HYDROCHLORIDE 1 MG: 1 INJECTION, SOLUTION INTRAMUSCULAR; INTRAVENOUS; SUBCUTANEOUS at 21:41

## 2019-04-30 RX ADMIN — HYDROMORPHONE HYDROCHLORIDE 1 MG: 1 INJECTION, SOLUTION INTRAMUSCULAR; INTRAVENOUS; SUBCUTANEOUS at 20:12

## 2019-04-30 ASSESSMENT — ENCOUNTER SYMPTOMS
VOMITING: 0
WHEEZING: 0
CHOKING: 0
RECTAL PAIN: 0
ABDOMINAL DISTENTION: 0
SHORTNESS OF BREATH: 0
NAUSEA: 0
BACK PAIN: 1
CHEST TIGHTNESS: 0
ABDOMINAL PAIN: 0
DIARRHEA: 0

## 2019-04-30 ASSESSMENT — PAIN SCALES - GENERAL
PAINLEVEL_OUTOF10: 10
PAINLEVEL_OUTOF10: 10
PAINLEVEL_OUTOF10: 2
PAINLEVEL_OUTOF10: 10
PAINLEVEL_OUTOF10: 10

## 2019-04-30 ASSESSMENT — PAIN DESCRIPTION - PAIN TYPE
TYPE: ACUTE PAIN;CHRONIC PAIN
TYPE: ACUTE PAIN

## 2019-04-30 ASSESSMENT — PAIN DESCRIPTION - LOCATION
LOCATION: BACK
LOCATION: BACK

## 2019-04-30 ASSESSMENT — PAIN DESCRIPTION - ORIENTATION: ORIENTATION: LOWER;MID;LEFT

## 2019-04-30 ASSESSMENT — PAIN DESCRIPTION - FREQUENCY: FREQUENCY: CONTINUOUS

## 2019-04-30 ASSESSMENT — PAIN DESCRIPTION - DESCRIPTORS: DESCRIPTORS: SHOOTING;SHARP

## 2019-04-30 ASSESSMENT — PAIN DESCRIPTION - PROGRESSION: CLINICAL_PROGRESSION: GRADUALLY WORSENING
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
## 2019-05-01 ENCOUNTER — TELEPHONE (OUTPATIENT)
Dept: INTERNAL MEDICINE CLINIC | Age: 29
End: 2019-05-01

## 2019-05-01 DIAGNOSIS — M54.5 LOW BACK PAIN, UNSPECIFIED BACK PAIN LATERALITY, UNSPECIFIED CHRONICITY, WITH SCIATICA PRESENCE UNSPECIFIED: Primary | ICD-10-CM

## 2019-05-01 NOTE — TELEPHONE ENCOUNTER
----- Message from Oumar Melgoza MD sent at 5/1/2019 11:51 AM EDT -----  Contact: [phone]  I believe pain is from her left groin and hip  I happy to order MRI if her back is bothering more  MRI LS spine without contrast  ----- Message -----  From: Sangita Clark  Sent: 5/1/2019  11:30 AM  To: Oumar Melgoza MD    Patient saw Ashlie Estrada, he did everything he could for her back pain. He told patient that he recommends you ordering an MRI with contrast of her back. Please advise.

## 2019-05-02 LAB — URINE CULTURE, ROUTINE: NORMAL

## 2019-05-07 ENCOUNTER — NURSE ONLY (OUTPATIENT)
Dept: CARDIOLOGY CLINIC | Age: 29
End: 2019-05-07
Payer: COMMERCIAL

## 2019-05-07 DIAGNOSIS — R00.0 SINUS TACHYCARDIA: ICD-10-CM

## 2019-05-07 DIAGNOSIS — R00.2 PALPITATIONS: ICD-10-CM

## 2019-05-07 DIAGNOSIS — Z45.09 ENCOUNTER FOR ELECTRONIC ANALYSIS OF REVEAL EVENT RECORDER: ICD-10-CM

## 2019-05-07 DIAGNOSIS — R55 SYNCOPE AND COLLAPSE: ICD-10-CM

## 2019-05-07 PROCEDURE — 93299 PR REM INTERROG ICPMS/SCRMS <30 D TECH REVIEW: CPT | Performed by: INTERNAL MEDICINE

## 2019-05-07 PROCEDURE — 93298 REM INTERROG DEV EVAL SCRMS: CPT | Performed by: INTERNAL MEDICINE
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
## 2019-05-10 ENCOUNTER — HOSPITAL ENCOUNTER (OUTPATIENT)
Dept: MRI IMAGING | Age: 29
Discharge: HOME | End: 2019-05-10
Payer: COMMERCIAL

## 2019-05-10 DIAGNOSIS — M54.5 LOW BACK PAIN, UNSPECIFIED BACK PAIN LATERALITY, UNSPECIFIED CHRONICITY, WITH SCIATICA PRESENCE UNSPECIFIED: ICD-10-CM

## 2019-05-10 PROCEDURE — 72148 MRI LUMBAR SPINE W/O DYE: CPT

## 2019-05-13 ENCOUNTER — TELEPHONE (OUTPATIENT)
Dept: INTERNAL MEDICINE CLINIC | Age: 29
End: 2019-05-13

## 2019-05-13 DIAGNOSIS — M54.9 BACK PAIN, UNSPECIFIED BACK LOCATION, UNSPECIFIED BACK PAIN LATERALITY, UNSPECIFIED CHRONICITY: Primary | ICD-10-CM

## 2019-05-13 NOTE — TELEPHONE ENCOUNTER
----- Message from Wilder Brooke sent at 5/13/2019  3:18 PM EDT -----  Contact: pt calld [phone]  Pt needs a referral to dr. Cabello Singer office for a pain specialist. The office phone is [phone]. Next terese 5/28. Last seen 2/28.

## 2019-05-20 ENCOUNTER — TELEPHONE (OUTPATIENT)
Dept: CARDIOLOGY CLINIC | Age: 29
End: 2019-05-20

## 2019-05-21 ENCOUNTER — PROCEDURE VISIT (OUTPATIENT)
Dept: CARDIOLOGY CLINIC | Age: 29
End: 2019-05-21

## 2019-05-21 DIAGNOSIS — Z45.09 ENCOUNTER FOR ELECTRONIC ANALYSIS OF REVEAL EVENT RECORDER: ICD-10-CM

## 2019-05-21 NOTE — TELEPHONE ENCOUNTER
See PACEART report under Cardiology tab. See telephone encounter from 5-20-19. Remote interrogation of implanted cardiac event monitor shows normal function. Dx syncope, palpitations, ST.  Last remote check 4-30-19.  30 TACHY-asymptomatic, appear to be ST. ? AT, no abrupt inclines/declines in heart rate noted. All < 2.5 min. episode     EXCEPT episode (5/18 @ 1057)  #849 (page 50/45) she is tachy-rate 410ms then  Rate rate drop to 640-740ms x 8 sec then back up to 430ms. Drop in heart rate noted on graph. Episode #844 (5/18 @ [id number]) page 56/75 shows a decline in heart rate per graph, appears steady. She episode list for specific dates and times of tachy episodes. She takes Toprol XL    SYMPTOM episode 5-7, appears to be ST, occ PAC's noted. No abrupt inclines/declines in heart rate. Report sent to DR Mckenna Tucker for review and confirmation. She is symptomatic and has frequent episodes of a fast rate rate I would recommend she take her remote monitor with her to [place].

## 2019-05-21 NOTE — PROGRESS NOTES
See telephone encounter from 5-20-19. Remote interrogation of implanted cardiac event monitor shows normal function. Dx syncope, palpitations, ST.  Last remote check 4-30-19.  30 TACHY-asymptomatic, appear to be ST. ? AT, no abrupt inclines/declines in heart rate noted. All < 2.5 min. episode     EXCEPT episode (5/18 @ 1057)  #849 (page 00/08) she is tachy-rate 410ms then  Rate rate drop to 640-740ms x 8 sec then back up to 430ms. Drop in heart rate noted on graph. Episode #844 (5/18 @ [id number]) page 56/75 shows a decline in heart rate per graph, appears steady. She episode list for specific dates and times of tachy episodes. She takes Toprol XL    SYMPTOM episode 5-7, appears to be ST, occ PAC's noted. No abrupt inclines/declines in heart rate. Report sent to DR Juan Francisco Feng for review and confirmation. See PACEART report under Cardiology tab. Follow up as scheduled.     Calvin Munroe MD  P [facility] Staff   Cc: Mirian Bryantt             Appears to be sinus tachycardia

## 2019-06-07 PROCEDURE — 93299 PR REM INTERROG ICPMS/SCRMS <30 D TECH REVIEW: CPT | Performed by: INTERNAL MEDICINE

## 2019-06-07 PROCEDURE — 93298 REM INTERROG DEV EVAL SCRMS: CPT | Performed by: INTERNAL MEDICINE

## 2019-06-11 ENCOUNTER — NURSE ONLY (OUTPATIENT)
Dept: CARDIOLOGY CLINIC | Age: 29
End: 2019-06-11
Payer: COMMERCIAL

## 2019-06-11 DIAGNOSIS — Z45.09 ENCOUNTER FOR ELECTRONIC ANALYSIS OF REVEAL EVENT RECORDER: ICD-10-CM

## 2019-06-11 DIAGNOSIS — R00.0 SINUS TACHYCARDIA: ICD-10-CM

## 2019-06-11 DIAGNOSIS — R55 SYNCOPE AND COLLAPSE: ICD-10-CM

## 2019-07-16 ENCOUNTER — NURSE ONLY (OUTPATIENT)
Dept: CARDIOLOGY CLINIC | Age: 29
End: 2019-07-16
Payer: COMMERCIAL

## 2019-07-16 DIAGNOSIS — R55 SYNCOPE AND COLLAPSE: ICD-10-CM

## 2019-07-16 DIAGNOSIS — R00.0 SINUS TACHYCARDIA: ICD-10-CM

## 2019-07-16 DIAGNOSIS — Z45.09 ENCOUNTER FOR ELECTRONIC ANALYSIS OF REVEAL EVENT RECORDER: ICD-10-CM

## 2019-07-16 PROCEDURE — 93299 PR REM INTERROG ICPMS/SCRMS <30 D TECH REVIEW: CPT | Performed by: INTERNAL MEDICINE

## 2019-07-16 PROCEDURE — 93298 REM INTERROG DEV EVAL SCRMS: CPT | Performed by: INTERNAL MEDICINE

## 2019-07-17 NOTE — PROGRESS NOTES
Remote interrogation of implanted cardiac event monitor shows normal function. Dx syncope, palpitations, hx ST. (Toprol XL)  Last check 6/10/19. No new symptom episodes recorded. No new arrhythmias. 45 TACHY episodes appear to be ST w/  Gradual incline and decline in HR. oversensing of noise/artifact noted. Longest 14  Min. Episode 939 on 6/27 @ 5675 shows a steep incline in HR from SR-ST-rate 167 bpm x 5.5 min. Follow up 1 month via [employer].     See PACEART report under Cardiology tab.

## 2019-09-16 ENCOUNTER — APPOINTMENT (OUTPATIENT)
Dept: ULTRASOUND IMAGING | Age: 29
End: 2019-09-16
Payer: COMMERCIAL

## 2019-09-16 ENCOUNTER — HOSPITAL ENCOUNTER (EMERGENCY)
Age: 29
Discharge: HOME | End: 2019-09-16
Attending: EMERGENCY MEDICINE
Payer: COMMERCIAL

## 2019-09-16 ENCOUNTER — APPOINTMENT (OUTPATIENT)
Dept: CT IMAGING | Age: 29
End: 2019-09-16
Payer: COMMERCIAL

## 2019-09-16 VITALS
SYSTOLIC BLOOD PRESSURE: 114 MMHG | HEIGHT: 62 IN | BODY MASS INDEX: 34.41 KG/M2 | OXYGEN SATURATION: 100 % | RESPIRATION RATE: 16 BRPM | DIASTOLIC BLOOD PRESSURE: 76 MMHG | WEIGHT: 187 LBS | HEART RATE: 92 BPM | TEMPERATURE: 98.4 F

## 2019-09-16 DIAGNOSIS — N83.202 HEMORRHAGIC CYST OF LEFT OVARY: Primary | ICD-10-CM

## 2019-09-16 LAB
A/G RATIO: 1.5 (ref 1.1–2.2)
ALBUMIN SERPL-MCNC: 4.6 G/DL (ref 3.4–5)
ALP BLD-CCNC: 64 U/L (ref 40–129)
ALT SERPL-CCNC: 22 U/L (ref 10–40)
ANION GAP SERPL CALCULATED.3IONS-SCNC: 13 MMOL/L (ref 3–16)
AST SERPL-CCNC: 19 U/L (ref 15–37)
BASOPHILS ABSOLUTE: 0.1 K/UL (ref 0–0.2)
BASOPHILS RELATIVE PERCENT: 1 %
BILIRUB SERPL-MCNC: 0.5 MG/DL (ref 0–1)
BILIRUBIN URINE: NEGATIVE
BLOOD, URINE: NEGATIVE
BUN BLDV-MCNC: 10 MG/DL (ref 7–20)
CALCIUM SERPL-MCNC: 9.8 MG/DL (ref 8.3–10.6)
CHLORIDE BLD-SCNC: 101 MMOL/L (ref 99–110)
CLARITY: CLEAR
CO2: 25 MMOL/L (ref 21–32)
COLOR: YELLOW
CREAT SERPL-MCNC: 0.7 MG/DL (ref 0.6–1.1)
EOSINOPHILS ABSOLUTE: 0 K/UL (ref 0–0.6)
EOSINOPHILS RELATIVE PERCENT: 0.7 %
GFR AFRICAN AMERICAN: >60
GFR NON-AFRICAN AMERICAN: >60
GLOBULIN: 3.1 G/DL
GLUCOSE BLD-MCNC: 100 MG/DL (ref 70–99)
GLUCOSE URINE: NEGATIVE MG/DL
HCG QUALITATIVE: NEGATIVE
HCT VFR BLD CALC: 38.7 % (ref 36–48)
HEMOGLOBIN: 13.4 G/DL (ref 12–16)
KETONES, URINE: NEGATIVE MG/DL
LEUKOCYTE ESTERASE, URINE: NEGATIVE
LYMPHOCYTES ABSOLUTE: 1.7 K/UL (ref 1–5.1)
LYMPHOCYTES RELATIVE PERCENT: 27.6 %
MCH RBC QN AUTO: 31.8 PG (ref 26–34)
MCHC RBC AUTO-ENTMCNC: 34.5 G/DL (ref 31–36)
MCV RBC AUTO: 92.3 FL (ref 80–100)
MICROSCOPIC EXAMINATION: NORMAL
MONOCYTES ABSOLUTE: 0.4 K/UL (ref 0–1.3)
MONOCYTES RELATIVE PERCENT: 6 %
NEUTROPHILS ABSOLUTE: 4.1 K/UL (ref 1.7–7.7)
NEUTROPHILS RELATIVE PERCENT: 64.7 %
NITRITE, URINE: NEGATIVE
PDW BLD-RTO: 12.9 % (ref 12.4–15.4)
PH UA: 8 (ref 5–8)
PLATELET # BLD: 246 K/UL (ref 135–450)
PMV BLD AUTO: 8.4 FL (ref 5–10.5)
POTASSIUM REFLEX MAGNESIUM: 3.9 MMOL/L (ref 3.5–5.1)
PROTEIN UA: NEGATIVE MG/DL
RBC # BLD: 4.19 M/UL (ref 4–5.2)
SODIUM BLD-SCNC: 139 MMOL/L (ref 136–145)
SPECIFIC GRAVITY UA: 1.01 (ref 1–1.03)
TOTAL PROTEIN: 7.7 G/DL (ref 6.4–8.2)
URINE REFLEX TO CULTURE: NORMAL
URINE TYPE: NORMAL
UROBILINOGEN, URINE: 0.2 E.U./DL
WBC # BLD: 6.3 K/UL (ref 4–11)

## 2019-09-16 PROCEDURE — 96375 TX/PRO/DX INJ NEW DRUG ADDON: CPT

## 2019-09-16 PROCEDURE — 6360000002 HC RX W HCPCS: Performed by: EMERGENCY MEDICINE

## 2019-09-16 PROCEDURE — 96374 THER/PROPH/DIAG INJ IV PUSH: CPT

## 2019-09-16 PROCEDURE — 74177 CT ABD & PELVIS W/CONTRAST: CPT

## 2019-09-16 PROCEDURE — 93975 VASCULAR STUDY: CPT

## 2019-09-16 PROCEDURE — 81003 URINALYSIS AUTO W/O SCOPE: CPT

## 2019-09-16 PROCEDURE — 2580000003 HC RX 258: Performed by: EMERGENCY MEDICINE

## 2019-09-16 PROCEDURE — 80053 COMPREHEN METABOLIC PANEL: CPT

## 2019-09-16 PROCEDURE — 99284 EMERGENCY DEPT VISIT MOD MDM: CPT

## 2019-09-16 PROCEDURE — 96361 HYDRATE IV INFUSION ADD-ON: CPT

## 2019-09-16 PROCEDURE — 84703 CHORIONIC GONADOTROPIN ASSAY: CPT

## 2019-09-16 PROCEDURE — 76856 US EXAM PELVIC COMPLETE: CPT

## 2019-09-16 PROCEDURE — 76830 TRANSVAGINAL US NON-OB: CPT

## 2019-09-16 PROCEDURE — 6360000004 HC RX CONTRAST MEDICATION: Performed by: EMERGENCY MEDICINE

## 2019-09-16 PROCEDURE — 85025 COMPLETE CBC W/AUTO DIFF WBC: CPT

## 2019-09-16 RX ORDER — 0.9 % SODIUM CHLORIDE 0.9 %
1000 INTRAVENOUS SOLUTION INTRAVENOUS ONCE
Status: COMPLETED | OUTPATIENT
Start: 2019-09-16 | End: 2019-09-16

## 2019-09-16 RX ORDER — ONDANSETRON 2 MG/ML
8 INJECTION INTRAMUSCULAR; INTRAVENOUS ONCE
Status: COMPLETED | OUTPATIENT
Start: 2019-09-16 | End: 2019-09-16

## 2019-09-16 RX ORDER — PROMETHAZINE HYDROCHLORIDE 25 MG/ML
12.5 INJECTION, SOLUTION INTRAMUSCULAR; INTRAVENOUS ONCE
Status: COMPLETED | OUTPATIENT
Start: 2019-09-16 | End: 2019-09-16

## 2019-09-16 RX ORDER — MORPHINE SULFATE 4 MG/ML
4 INJECTION, SOLUTION INTRAMUSCULAR; INTRAVENOUS ONCE
Status: COMPLETED | OUTPATIENT
Start: 2019-09-16 | End: 2019-09-16

## 2019-09-16 RX ORDER — KETOROLAC TROMETHAMINE 10 MG/1
10 TABLET, FILM COATED ORAL EVERY 8 HOURS PRN
Qty: 15 TABLET | Refills: 0 | Status: SHIPPED | OUTPATIENT
Start: 2019-09-16 | End: 2019-12-03

## 2019-09-16 RX ORDER — KETOROLAC TROMETHAMINE 30 MG/ML
15 INJECTION, SOLUTION INTRAMUSCULAR; INTRAVENOUS ONCE
Status: COMPLETED | OUTPATIENT
Start: 2019-09-16 | End: 2019-09-16

## 2019-09-16 RX ADMIN — MORPHINE SULFATE 4 MG: 4 INJECTION INTRAVENOUS at 10:34

## 2019-09-16 RX ADMIN — KETOROLAC TROMETHAMINE 15 MG: 30 INJECTION, SOLUTION INTRAMUSCULAR; INTRAVENOUS at 13:19

## 2019-09-16 RX ADMIN — ONDANSETRON 8 MG: 2 INJECTION INTRAMUSCULAR; INTRAVENOUS at 10:34

## 2019-09-16 RX ADMIN — SODIUM CHLORIDE 1000 ML: 9 INJECTION, SOLUTION INTRAVENOUS at 10:37

## 2019-09-16 RX ADMIN — MORPHINE SULFATE 4 MG: 4 INJECTION INTRAVENOUS at 11:48

## 2019-09-16 RX ADMIN — IOPAMIDOL 75 ML: 755 INJECTION, SOLUTION INTRAVENOUS at 11:14

## 2019-09-16 RX ADMIN — PROMETHAZINE HYDROCHLORIDE 12.5 MG: 25 INJECTION INTRAMUSCULAR; INTRAVENOUS at 13:18

## 2019-09-16 ASSESSMENT — PAIN DESCRIPTION - ORIENTATION
ORIENTATION: LEFT
ORIENTATION: LEFT

## 2019-09-16 ASSESSMENT — PAIN DESCRIPTION - PAIN TYPE
TYPE: ACUTE PAIN
TYPE: ACUTE PAIN

## 2019-09-16 ASSESSMENT — PAIN SCALES - GENERAL
PAINLEVEL_OUTOF10: 0
PAINLEVEL_OUTOF10: 9
PAINLEVEL_OUTOF10: 10
PAINLEVEL_OUTOF10: 8
PAINLEVEL_OUTOF10: 8

## 2019-09-16 ASSESSMENT — PAIN DESCRIPTION - LOCATION
LOCATION: ABDOMEN
LOCATION: ABDOMEN

## 2019-09-16 NOTE — ED PROVIDER NOTES
0.0 - 1.0 mg/dL    Alkaline Phosphatase 64 40 - 129 U/L    ALT 22 10 - 40 U/L    AST 19 15 - 37 U/L    Globulin 3.1 g/dL   Urinalysis Reflex to Culture   Result Value Ref Range    Color, UA Yellow Straw/Yellow    Clarity, UA Clear Clear    Glucose, Ur Negative Negative mg/dL    Bilirubin Urine Negative Negative    Ketones, Urine Negative Negative mg/dL    Specific Gravity, UA 1.010 1.005 - 1.030    Blood, Urine Negative Negative    pH, UA 8.0 5.0 - 8.0    Protein, UA Negative Negative mg/dL    Urobilinogen, Urine 0.2 <2.0 E.U./dL    Nitrite, Urine Negative Negative    Leukocyte Esterase, Urine Negative Negative    Microscopic Examination Not Indicated     Urine Reflex to Culture Not Indicated     Urine Type Not Specified    HCG Qualitative, Serum   Result Value Ref Range    hCG Qual Negative Detects HCG level >10 MIU/mL       RADIOLOGY  I have reviewed all radiographic studies for this visit. US PELVIS COMPLETE   Preliminary Result   1. Findings consistent with presence of complex hypoechoic lesion within the   left ovary. Complex/hemorrhagic cyst is in the differential diagnosis. Follow-up ultrasound examination after 2-3 menstrual cycles is recommended to   document resolution. 2. No ultrasonographic findings to suggest presence of ovarian torsion. 3. Small amount of free fluid within the pelvis as described. US NON OB TRANSVAGINAL   Preliminary Result   1. Findings consistent with presence of complex hypoechoic lesion within the   left ovary. Complex/hemorrhagic cyst is in the differential diagnosis. Follow-up ultrasound examination after 2-3 menstrual cycles is recommended to   document resolution. 2. No ultrasonographic findings to suggest presence of ovarian torsion. 3. Small amount of free fluid within the pelvis as described. US DUP ABD PEL RETRO SCROT COMPLETE   Preliminary Result   1. Findings consistent with presence of complex hypoechoic lesion within the   left ovary.

## 2019-10-08 ENCOUNTER — NURSE ONLY (OUTPATIENT)
Dept: CARDIOLOGY CLINIC | Age: 29
End: 2019-10-08
Payer: COMMERCIAL

## 2019-10-08 DIAGNOSIS — R55 SYNCOPE AND COLLAPSE: ICD-10-CM

## 2019-10-08 DIAGNOSIS — Z45.09 ENCOUNTER FOR ELECTRONIC ANALYSIS OF REVEAL EVENT RECORDER: ICD-10-CM

## 2019-10-08 DIAGNOSIS — R00.0 SINUS TACHYCARDIA: ICD-10-CM

## 2019-10-08 PROCEDURE — 93298 REM INTERROG DEV EVAL SCRMS: CPT | Performed by: INTERNAL MEDICINE

## 2019-10-08 PROCEDURE — 93299 PR REM INTERROG ICPMS/SCRMS <30 D TECH REVIEW: CPT | Performed by: INTERNAL MEDICINE

## 2019-12-03 ENCOUNTER — APPOINTMENT (OUTPATIENT)
Dept: GENERAL RADIOLOGY | Age: 29
End: 2019-12-03
Payer: COMMERCIAL

## 2019-12-03 ENCOUNTER — APPOINTMENT (OUTPATIENT)
Dept: CT IMAGING | Age: 29
End: 2019-12-03
Payer: COMMERCIAL

## 2019-12-03 ENCOUNTER — HOSPITAL ENCOUNTER (EMERGENCY)
Age: 29
Discharge: HOME | End: 2019-12-03
Payer: COMMERCIAL

## 2019-12-03 VITALS
OXYGEN SATURATION: 100 % | RESPIRATION RATE: 16 BRPM | HEIGHT: 62 IN | SYSTOLIC BLOOD PRESSURE: 133 MMHG | BODY MASS INDEX: 33.49 KG/M2 | WEIGHT: 182 LBS | TEMPERATURE: 97 F | DIASTOLIC BLOOD PRESSURE: 85 MMHG | HEART RATE: 92 BPM

## 2019-12-03 DIAGNOSIS — S09.90XA CLOSED HEAD INJURY, INITIAL ENCOUNTER: ICD-10-CM
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
## 2019-12-03 PROCEDURE — 96372 THER/PROPH/DIAG INJ SC/IM: CPT

## 2019-12-03 PROCEDURE — 72125 CT NECK SPINE W/O DYE: CPT

## 2019-12-03 PROCEDURE — 73610 X-RAY EXAM OF ANKLE: CPT

## 2019-12-03 PROCEDURE — 72220 X-RAY EXAM SACRUM TAILBONE: CPT

## 2019-12-03 PROCEDURE — 73562 X-RAY EXAM OF KNEE 3: CPT

## 2019-12-03 PROCEDURE — 72100 X-RAY EXAM L-S SPINE 2/3 VWS: CPT

## 2019-12-03 PROCEDURE — 70450 CT HEAD/BRAIN W/O DYE: CPT

## 2019-12-03 PROCEDURE — 6370000000 HC RX 637 (ALT 250 FOR IP): Performed by: NURSE PRACTITIONER

## 2019-12-03 PROCEDURE — 99283 EMERGENCY DEPT VISIT LOW MDM: CPT

## 2019-12-03 PROCEDURE — 6360000002 HC RX W HCPCS: Performed by: NURSE PRACTITIONER

## 2019-12-03 PROCEDURE — 73590 X-RAY EXAM OF LOWER LEG: CPT

## 2019-12-03 RX ORDER — ONDANSETRON 4 MG/1
4 TABLET, ORALLY DISINTEGRATING ORAL ONCE
Status: COMPLETED | OUTPATIENT
Start: 2019-12-03 | End: 2019-12-03

## 2019-12-03 RX ORDER — METHOCARBAMOL 750 MG/1
750 TABLET, FILM COATED ORAL 4 TIMES DAILY
Qty: 40 TABLET | Refills: 0 | Status: SHIPPED | OUTPATIENT
Start: 2019-12-03 | End: 2019-12-13

## 2019-12-03 RX ORDER — ORPHENADRINE CITRATE 30 MG/ML
60 INJECTION INTRAMUSCULAR; INTRAVENOUS ONCE
Status: COMPLETED | OUTPATIENT
Start: 2019-12-03 | End: 2019-12-03

## 2019-12-03 RX ORDER — ONDANSETRON 4 MG/1
4 TABLET, ORALLY DISINTEGRATING ORAL EVERY 8 HOURS PRN
Qty: 20 TABLET | Refills: 0 | Status: SHIPPED | OUTPATIENT
Start: 2019-12-03 | End: 2020-01-02

## 2019-12-03 RX ORDER — KETOROLAC TROMETHAMINE 30 MG/ML
30 INJECTION, SOLUTION INTRAMUSCULAR; INTRAVENOUS ONCE
Status: COMPLETED | OUTPATIENT
Start: 2019-12-03 | End: 2019-12-03

## 2019-12-03 RX ORDER — HYDROCODONE BITARTRATE AND ACETAMINOPHEN 5; 325 MG/1; MG/1
1 TABLET ORAL EVERY 6 HOURS PRN
Qty: 10 TABLET | Refills: 0 | Status: SHIPPED | OUTPATIENT
Start: 2019-12-03 | End: 2019-12-06

## 2019-12-03 RX ORDER — KETOROLAC TROMETHAMINE 30 MG/ML
30 INJECTION, SOLUTION INTRAMUSCULAR; INTRAVENOUS ONCE
Status: DISCONTINUED | OUTPATIENT
Start: 2019-12-03 | End: 2019-12-03

## 2019-12-03 RX ADMIN — ONDANSETRON 4 MG: 4 TABLET, ORALLY DISINTEGRATING ORAL at 17:20

## 2019-12-03 RX ADMIN — KETOROLAC TROMETHAMINE 30 MG: 30 INJECTION, SOLUTION INTRAMUSCULAR; INTRAVENOUS at 17:28

## 2019-12-03 RX ADMIN — ORPHENADRINE CITRATE 60 MG: 30 INJECTION INTRAMUSCULAR; INTRAVENOUS at 17:20

## 2019-12-03 ASSESSMENT — PAIN SCALES - GENERAL: PAINLEVEL_OUTOF10: 8

## 2019-12-03 ASSESSMENT — ENCOUNTER SYMPTOMS
COUGH: 0
BACK PAIN: 1
ABDOMINAL PAIN: 0
SHORTNESS OF BREATH: 0

## 2019-12-17 ENCOUNTER — NURSE ONLY (OUTPATIENT)
Dept: CARDIOLOGY CLINIC | Age: 29
End: 2019-12-17
Payer: COMMERCIAL

## 2019-12-17 DIAGNOSIS — R55 SYNCOPE AND COLLAPSE: ICD-10-CM

## 2019-12-17 DIAGNOSIS — Z45.09 ENCOUNTER FOR ELECTRONIC ANALYSIS OF REVEAL EVENT RECORDER: ICD-10-CM

## 2019-12-17 DIAGNOSIS — R00.0 SINUS TACHYCARDIA: ICD-10-CM

## 2019-12-17 PROCEDURE — 93298 REM INTERROG DEV EVAL SCRMS: CPT | Performed by: INTERNAL MEDICINE

## 2019-12-17 PROCEDURE — 93299 PR REM INTERROG ICPMS/SCRMS <30 D TECH REVIEW: CPT | Performed by: INTERNAL MEDICINE

## 2020-01-02 ENCOUNTER — HOSPITAL ENCOUNTER (EMERGENCY)
Age: 30
Discharge: HOME | End: 2020-01-02
Payer: COMMERCIAL

## 2020-01-02 ENCOUNTER — APPOINTMENT (OUTPATIENT)
Dept: GENERAL RADIOLOGY | Age: 30
End: 2020-01-02
Payer: COMMERCIAL

## 2020-01-02 VITALS
TEMPERATURE: 99.1 F | HEIGHT: 62 IN | OXYGEN SATURATION: 98 % | RESPIRATION RATE: 16 BRPM | DIASTOLIC BLOOD PRESSURE: 88 MMHG | BODY MASS INDEX: 33.49 KG/M2 | HEART RATE: 92 BPM | WEIGHT: 182 LBS | SYSTOLIC BLOOD PRESSURE: 131 MMHG

## 2020-01-02 LAB
BILIRUBIN URINE: NEGATIVE
BLOOD, URINE: NEGATIVE
CLARITY: CLEAR
COLOR: YELLOW
GLUCOSE URINE: NEGATIVE MG/DL
KETONES, URINE: NEGATIVE MG/DL
LEUKOCYTE ESTERASE, URINE: NEGATIVE
MICROSCOPIC EXAMINATION: NORMAL
NITRITE, URINE: NEGATIVE
PH UA: 6.5 (ref 5–8)
PROTEIN UA: NEGATIVE MG/DL
RAPID INFLUENZA  B AGN: NEGATIVE
RAPID INFLUENZA A AGN: NEGATIVE
S PYO AG THROAT QL: NEGATIVE
SPECIFIC GRAVITY UA: 1.02 (ref 1–1.03)
URINE REFLEX TO CULTURE: NORMAL
URINE TYPE: NORMAL
UROBILINOGEN, URINE: 1 E.U./DL

## 2020-01-02 PROCEDURE — 87804 INFLUENZA ASSAY W/OPTIC: CPT

## 2020-01-02 PROCEDURE — 81003 URINALYSIS AUTO W/O SCOPE: CPT

## 2020-01-02 PROCEDURE — 71046 X-RAY EXAM CHEST 2 VIEWS: CPT

## 2020-01-02 PROCEDURE — 87880 STREP A ASSAY W/OPTIC: CPT

## 2020-01-02 PROCEDURE — 87081 CULTURE SCREEN ONLY: CPT

## 2020-01-02 PROCEDURE — 6370000000 HC RX 637 (ALT 250 FOR IP): Performed by: PHYSICIAN ASSISTANT

## 2020-01-02 PROCEDURE — 99283 EMERGENCY DEPT VISIT LOW MDM: CPT

## 2020-01-02 RX ORDER — BENZONATATE 100 MG/1
200 CAPSULE ORAL ONCE
Status: COMPLETED | OUTPATIENT
Start: 2020-01-02 | End: 2020-01-02

## 2020-01-02 RX ORDER — BENZONATATE 100 MG/1
100-200 CAPSULE ORAL 3 TIMES DAILY PRN
Qty: 20 CAPSULE | Refills: 0 | Status: SHIPPED | OUTPATIENT
Start: 2020-01-02 | End: 2020-01-09

## 2020-01-02 RX ORDER — AZITHROMYCIN 250 MG/1
500 TABLET, FILM COATED ORAL ONCE
Status: DISCONTINUED | OUTPATIENT
Start: 2020-01-02 | End: 2020-01-02

## 2020-01-02 RX ORDER — DOXYCYCLINE HYCLATE 100 MG
100 TABLET ORAL 2 TIMES DAILY
Qty: 20 TABLET | Refills: 0 | Status: SHIPPED | OUTPATIENT
Start: 2020-01-02 | End: 2020-01-12

## 2020-01-02 RX ORDER — ALBUTEROL SULFATE 90 UG/1
2 AEROSOL, METERED RESPIRATORY (INHALATION) ONCE
Status: COMPLETED | OUTPATIENT
Start: 2020-01-02 | End: 2020-01-02

## 2020-01-02 RX ORDER — PREDNISONE 20 MG/1
40 TABLET ORAL DAILY
Qty: 4 TABLET | Refills: 0 | Status: SHIPPED | OUTPATIENT
Start: 2020-01-02 | End: 2020-01-04

## 2020-01-02 RX ORDER — AMOXICILLIN AND CLAVULANATE POTASSIUM 875; 125 MG/1; MG/1
1 TABLET, FILM COATED ORAL ONCE
Status: DISCONTINUED | OUTPATIENT
Start: 2020-01-02 | End: 2020-01-02

## 2020-01-02 RX ORDER — PREDNISONE 20 MG/1
40 TABLET ORAL ONCE
Status: COMPLETED | OUTPATIENT
Start: 2020-01-02 | End: 2020-01-02

## 2020-01-02 RX ORDER — DOXYCYCLINE HYCLATE 100 MG
100 TABLET ORAL ONCE
Status: COMPLETED | OUTPATIENT
Start: 2020-01-02 | End: 2020-01-02

## 2020-01-02 RX ADMIN — DOXYCYCLINE HYCLATE 100 MG: 100 TABLET, COATED ORAL at 19:18

## 2020-01-02 RX ADMIN — PREDNISONE 40 MG: 20 TABLET ORAL at 19:18

## 2020-01-02 RX ADMIN — BENZONATATE 200 MG: 100 CAPSULE ORAL at 19:18

## 2020-01-02 RX ADMIN — ALBUTEROL SULFATE 2 PUFF: 90 AEROSOL, METERED RESPIRATORY (INHALATION) at 19:18

## 2020-01-02 ASSESSMENT — ENCOUNTER SYMPTOMS
COUGH: 1
CHEST TIGHTNESS: 1
SINUS CONGESTION: 1
SORE THROAT: 1
VOMITING: 0

## 2020-01-02 ASSESSMENT — PAIN DESCRIPTION - PAIN TYPE: TYPE: ACUTE PAIN

## 2020-01-02 ASSESSMENT — PAIN DESCRIPTION - FREQUENCY: FREQUENCY: CONTINUOUS

## 2020-01-02 ASSESSMENT — PAIN DESCRIPTION - LOCATION: LOCATION: GENERALIZED

## 2020-01-02 ASSESSMENT — PAIN DESCRIPTION - DESCRIPTORS: DESCRIPTORS: ACHING

## 2020-01-02 ASSESSMENT — PAIN DESCRIPTION - ONSET: ONSET: ON-GOING

## 2020-01-02 ASSESSMENT — PAIN DESCRIPTION - PROGRESSION: CLINICAL_PROGRESSION: NOT CHANGED

## 2020-01-02 ASSESSMENT — PAIN SCALES - GENERAL: PAINLEVEL_OUTOF10: 4

## 2020-01-02 NOTE — LETTER
[address] [facility] Emergency Department  [facility] [address]  Phone: [phone]               January 2, 2020    Patient: Adia Menjivar   YOB: 1990   Date of Visit: 1/2/2020       To Whom It May Concern:    Nickolas Alva was seen and treated in our emergency department on 1/2/2020. Please excuse from work 1/2-1/4.       Sincerely,       Janett Nixon PA-C         Signature:__________________________________

## 2020-01-03 NOTE — ED PROVIDER NOTES
[address] [facility]        Pt Name: Yeni Bose  MRN: [id number]  [place] 1990  Date of evaluation: 1/2/2020  Provider: Harley Thompson PA-C  PCP: Mikayla Lugo MD    This patient was not seen and evaluated by the attending physician       [address] [place] [address]       Chief Complaint   Patient presents with    Influenza     flu like symptoms for 3 weeks       HISTORY OF PRESENT ILLNESS   (Location/Symptom, Timing/Onset, Context/Setting, Quality, Duration, Modifying Factors, Severity)  Note limiting factors. Yeni Bose is a 26 y.o. female presented to the ER with complaint of cough for the past 3 weeks. States started out with sinus drainage and a sore throat has had body aches continues with coughing chest is sore from coughing. The history is provided by the patient. The history is limited by the condition of the patient. Cough   Cough characteristics:  Non-productive  Onset quality:  Gradual  Duration:  3 weeks  Progression:  Unchanged  Chronicity:  New  Smoker: no    Associated symptoms: chills, ear pain, myalgias, sinus congestion and sore throat    Associated symptoms: no fever and no rash        Nursing Notes were reviewed    REVIEW OF SYSTEMS    (2-9 systems for level 4, 10 or more for level 5)     Review of Systems   Constitutional: Positive for chills. Negative for fever. HENT: Positive for congestion, ear pain and sore throat. Respiratory: Positive for cough and chest tightness. Gastrointestinal: Negative for vomiting. Musculoskeletal: Positive for myalgias. Skin: Negative for rash. Positives and Pertinent negatives as per HPI.        PAST MEDICAL HISTORY     Past Medical History:   Diagnosis Date    Anemia     Currently on Iron PO    Anxiety     Heart abnormalities     \"heart flutters real fast\"    Miscarriage 2009    Ovarian cyst     Tricuspid regurgitation          SURGICAL HISTORY     Past Surgical History:   Procedure Laterality Date    APPENDECTOMY  2006    ENDOMETRIAL ABLATION  02/01/2017    With Tubal ligation    HYSTERECTOMY      INSERTABLE CARDIAC MONITOR  05/24/2017    Loop Recorder Left Chest    OVARIAN CYST SURGERY           CURRENTMEDICATIONS       Discharge Medication List as of 1/2/2020  7:12 PM            ALLERGIES     Demerol; Meperidine; Percocet [oxycodone-acetaminophen]; and Procardia [nifedipine]    FAMILYHISTORY       Family History   Problem Relation Age of Onset    Cancer Maternal Grandmother         Thyroid    Cancer Paternal Grandmother     Arthritis Mother     Mental Retardation Maternal Uncle           SOCIAL HISTORY       Social History     Socioeconomic History    Marital status:      Spouse name: None    Number of children: None    Years of education: None    Highest education level: None   Occupational History    None   Social Needs    Financial resource strain: None    Food insecurity:     Worry: None     Inability: None    Transportation needs:     Medical: None     Non-medical: None   Tobacco Use    Smoking status: Never Smoker    Smokeless tobacco: Never Used   Substance and Sexual Activity    Alcohol use: No    Drug use: No    Sexual activity: Yes     Partners: Male   Lifestyle    Physical activity:     Days per week: None     Minutes per session: None    Stress: None   Relationships    Social connections:     Talks on phone: None     Gets together: None     Attends [employer] service: None     Active member of club or organization: None     Attends meetings of clubs or organizations: None     Relationship status: None    Intimate partner violence:     Fear of current or ex partner: None     Emotionally abused: None     Physically abused: None     Forced sexual activity: None   Other Topics Concern    None   Social History Narrative    None       SCREENINGS             PHYSICAL EXAM    (up to 7 for level 4, 8 or more for level 5)     ED Triage MRI are read by the radiologist. Plain radiographic images are visualized andpreliminarily interpreted by the  ED Provider with the below findings:        Interpretation [facility] Radiologist below, if available at the time of this note:    XR CHEST STANDARD (2 VW)   Final Result   Left basilar airspace disease compatible with pneumonia given patient   history. Recommend follow-up to resolution. Xr Chest Standard (2 Vw)    Result Date: 1/2/2020  EXAMINATION: TWO X-RAY VIEWS OF THE CHEST 1/2/2020 6:44 pm COMPARISON: 03/11/2019 HISTORY: ORDERING SYSTEM PROVIDED HISTORY: Cough fever r/o pneumonia TECHNOLOGIST PROVIDED HISTORY: Reason for exam:->Cough fever r/o pneumonia Reason for Exam: Cough, congestion Acuity: Acute Type of Exam: Initial FINDINGS: Heart size is within normal limits. There is an implantable rate monitoring device overlying the left chest. Increased hazy and patchy opacity noted overlying the left lung base which is new from the comparison. No pleural effusion is noted. Osseous structures are unremarkable. Left basilar airspace disease compatible with pneumonia given patient history. Recommend follow-up to resolution.            PROCEDURES   Unless otherwise noted below, none     Procedures    CRITICAL CARE TIME   N/A    CONSULTS:  None      EMERGENCY DEPARTMENT COURSE and DIFFERENTIAL DIAGNOSIS/MDM:   Vitals:    Vitals:    01/02/20 1838   BP: 131/88   Pulse: 92   Resp: 16   Temp: 99.1 °F (37.3 °C)   TempSrc: Oral   SpO2: 98%   Weight: 182 lb (82.6 kg)   Height: 5' 2\" (1.575 m)       Patient was given thefollowing medications:  Medications   albuterol sulfate  (90 Base) MCG/ACT inhaler 2 puff (2 puffs Inhalation Given 1/2/20 1918)   predniSONE (DELTASONE) tablet 40 mg (40 mg Oral Given 1/2/20 1918)   doxycycline hyclate (VIBRA-TABS) tablet 100 mg (100 mg Oral Given 1/2/20 1918)   benzonatate (TESSALON) capsule 200 mg (200 mg Oral Given 1/2/20 1918)       The patient has pneumonia, but does not have HYPOXIA, VOMITING, SIGNIFICANT CO-MORBIDITIES, TOXICITY, OR SEVERE SEPSIS, thus I consider the discharge disposition reasonable. FINAL IMPRESSION      1. Pneumonia, unspecified organism          DISPOSITION/PLAN   DISPOSITION Decision to Discharge    PATIENT REFERREDTO:  [facility] [address] [facility]  [phone]    In 4 days      [address]  [facility] Emergency Department  [address] [facility],[address]  [phone]    If symptoms worsen      DISCHARGE MEDICATIONS:  Discharge Medication List as of 1/2/2020  7:12 PM      START taking these medications    Details   doxycycline hyclate (VIBRA-TABS) 100 MG tablet Take 1 tablet by mouth 2 times daily for 10 days, Disp-20 tablet, R-0Print      predniSONE (DELTASONE) 20 MG tablet Take 2 tablets by mouth daily for 2 days, Disp-4 tablet, R-0Print      benzonatate (TESSALON) 100 MG capsule Take 1-2 capsules by mouth 3 times daily as needed for Cough, Disp-20 capsule, R-0Print             DISCONTINUED MEDICATIONS:  Discharge Medication List as of 1/2/2020  7:12 PM      STOP taking these medications       ondansetron (ZOFRAN ODT) 4 MG disintegrating tablet Comments:   Reason for Stopping:         diclofenac (VOLTAREN) 50 MG EC tablet Comments:   Reason for Stopping:                      (Please note that portions ofthis note were completed with a voice recognition program.  Efforts were made to edit the dictations but occasionally words are mis-transcribed.)    Osiel Cortez PA-C (electronically signed)            Wilma Schlatter, PA-C  01/02/20 4073

## 2020-01-04 ENCOUNTER — APPOINTMENT (OUTPATIENT)
Dept: GENERAL RADIOLOGY | Age: 30
End: 2020-01-04
Payer: COMMERCIAL

## 2020-01-04 ENCOUNTER — HOSPITAL ENCOUNTER (EMERGENCY)
Age: 30
Discharge: HOME | End: 2020-01-04
Attending: EMERGENCY MEDICINE
Payer: COMMERCIAL

## 2020-01-04 VITALS
BODY MASS INDEX: 33.49 KG/M2 | RESPIRATION RATE: 18 BRPM | TEMPERATURE: 98 F | HEIGHT: 62 IN | OXYGEN SATURATION: 100 % | HEART RATE: 87 BPM | DIASTOLIC BLOOD PRESSURE: 68 MMHG | WEIGHT: 182 LBS | SYSTOLIC BLOOD PRESSURE: 118 MMHG

## 2020-01-04 LAB
A/G RATIO: 1.1 (ref 1.1–2.2)
ALBUMIN SERPL-MCNC: 4.1 G/DL (ref 3.4–5)
ALP BLD-CCNC: 83 U/L (ref 40–129)
ALT SERPL-CCNC: 47 U/L (ref 10–40)
ANION GAP SERPL CALCULATED.3IONS-SCNC: 14 MMOL/L (ref 3–16)
AST SERPL-CCNC: 25 U/L (ref 15–37)
BASOPHILS ABSOLUTE: 0 K/UL (ref 0–0.2)
BASOPHILS RELATIVE PERCENT: 0.2 %
BILIRUB SERPL-MCNC: <0.2 MG/DL (ref 0–1)
BUN BLDV-MCNC: 15 MG/DL (ref 7–20)
CALCIUM SERPL-MCNC: 9.8 MG/DL (ref 8.3–10.6)
CHLORIDE BLD-SCNC: 105 MMOL/L (ref 99–110)
CO2: 21 MMOL/L (ref 21–32)
CREAT SERPL-MCNC: 0.6 MG/DL (ref 0.6–1.1)
EOSINOPHILS ABSOLUTE: 0 K/UL (ref 0–0.6)
EOSINOPHILS RELATIVE PERCENT: 0 %
GFR AFRICAN AMERICAN: >60
GFR NON-AFRICAN AMERICAN: >60
GLOBULIN: 3.8 G/DL
GLUCOSE BLD-MCNC: 136 MG/DL (ref 70–99)
HCT VFR BLD CALC: 38.6 % (ref 36–48)
HEMOGLOBIN: 12.8 G/DL (ref 12–16)
LACTIC ACID, SEPSIS: 3.3 MMOL/L (ref 0.4–1.9)
LYMPHOCYTES ABSOLUTE: 1.1 K/UL (ref 1–5.1)
LYMPHOCYTES RELATIVE PERCENT: 7.2 %
MCH RBC QN AUTO: 31 PG (ref 26–34)
MCHC RBC AUTO-ENTMCNC: 33.3 G/DL (ref 31–36)
MCV RBC AUTO: 93.1 FL (ref 80–100)
MONOCYTES ABSOLUTE: 0.4 K/UL (ref 0–1.3)
MONOCYTES RELATIVE PERCENT: 2.6 %
NEUTROPHILS ABSOLUTE: 13.8 K/UL (ref 1.7–7.7)
NEUTROPHILS RELATIVE PERCENT: 90 %
PDW BLD-RTO: 12.7 % (ref 12.4–15.4)
PLATELET # BLD: 250 K/UL (ref 135–450)
PMV BLD AUTO: 9 FL (ref 5–10.5)
POTASSIUM REFLEX MAGNESIUM: 3.7 MMOL/L (ref 3.5–5.1)
PRO-BNP: 156 PG/ML (ref 0–124)
RBC # BLD: 4.14 M/UL (ref 4–5.2)
SODIUM BLD-SCNC: 140 MMOL/L (ref 136–145)
TOTAL PROTEIN: 7.9 G/DL (ref 6.4–8.2)
TROPONIN: <0.01 NG/ML
WBC # BLD: 15.3 K/UL (ref 4–11)

## 2020-01-04 PROCEDURE — 80053 COMPREHEN METABOLIC PANEL: CPT

## 2020-01-04 PROCEDURE — 84484 ASSAY OF TROPONIN QUANT: CPT

## 2020-01-04 PROCEDURE — 85025 COMPLETE CBC W/AUTO DIFF WBC: CPT

## 2020-01-04 PROCEDURE — 6360000002 HC RX W HCPCS: Performed by: EMERGENCY MEDICINE

## 2020-01-04 PROCEDURE — 83605 ASSAY OF LACTIC ACID: CPT

## 2020-01-04 PROCEDURE — 2500000003 HC RX 250 WO HCPCS

## 2020-01-04 PROCEDURE — 99284 EMERGENCY DEPT VISIT MOD MDM: CPT

## 2020-01-04 PROCEDURE — 2580000003 HC RX 258: Performed by: EMERGENCY MEDICINE

## 2020-01-04 PROCEDURE — 71045 X-RAY EXAM CHEST 1 VIEW: CPT

## 2020-01-04 PROCEDURE — 96374 THER/PROPH/DIAG INJ IV PUSH: CPT

## 2020-01-04 PROCEDURE — 36415 COLL VENOUS BLD VENIPUNCTURE: CPT

## 2020-01-04 PROCEDURE — 83880 ASSAY OF NATRIURETIC PEPTIDE: CPT

## 2020-01-04 PROCEDURE — 87040 BLOOD CULTURE FOR BACTERIA: CPT

## 2020-01-04 PROCEDURE — 96372 THER/PROPH/DIAG INJ SC/IM: CPT

## 2020-01-04 PROCEDURE — 96361 HYDRATE IV INFUSION ADD-ON: CPT

## 2020-01-04 PROCEDURE — 6370000000 HC RX 637 (ALT 250 FOR IP): Performed by: EMERGENCY MEDICINE

## 2020-01-04 RX ORDER — PROMETHAZINE HYDROCHLORIDE 12.5 MG/1
12.5 TABLET ORAL 4 TIMES DAILY PRN
Qty: 20 TABLET | Refills: 0 | Status: SHIPPED | OUTPATIENT
Start: 2020-01-04 | End: 2020-01-11

## 2020-01-04 RX ORDER — CEFDINIR 300 MG/1
300 CAPSULE ORAL 2 TIMES DAILY
Qty: 20 CAPSULE | Refills: 0 | Status: SHIPPED | OUTPATIENT
Start: 2020-01-04 | End: 2020-01-14

## 2020-01-04 RX ORDER — PROMETHAZINE HYDROCHLORIDE 25 MG/ML
12.5 INJECTION, SOLUTION INTRAMUSCULAR; INTRAVENOUS ONCE
Status: COMPLETED | OUTPATIENT
Start: 2020-01-04 | End: 2020-01-04

## 2020-01-04 RX ORDER — LIDOCAINE HYDROCHLORIDE 10 MG/ML
INJECTION, SOLUTION EPIDURAL; INFILTRATION; INTRACAUDAL; PERINEURAL
Status: COMPLETED
Start: 2020-01-04 | End: 2020-01-04

## 2020-01-04 RX ORDER — LIDOCAINE HYDROCHLORIDE 20 MG/ML
5 INJECTION, SOLUTION INFILTRATION; PERINEURAL ONCE
Status: DISCONTINUED | OUTPATIENT
Start: 2020-01-04 | End: 2020-01-04 | Stop reason: HOSPADM

## 2020-01-04 RX ORDER — CEFDINIR 300 MG/1
300 CAPSULE ORAL ONCE
Status: COMPLETED | OUTPATIENT
Start: 2020-01-04 | End: 2020-01-04

## 2020-01-04 RX ORDER — LIDOCAINE HYDROCHLORIDE 40 MG/ML
4 INJECTION, SOLUTION RETROBULBAR; TOPICAL ONCE
Status: DISCONTINUED | OUTPATIENT
Start: 2020-01-04 | End: 2020-01-04

## 2020-01-04 RX ORDER — 0.9 % SODIUM CHLORIDE 0.9 %
30 INTRAVENOUS SOLUTION INTRAVENOUS ONCE
Status: COMPLETED | OUTPATIENT
Start: 2020-01-04 | End: 2020-01-04

## 2020-01-04 RX ORDER — HYDROCODONE BITARTRATE AND ACETAMINOPHEN 5; 325 MG/1; MG/1
1 TABLET ORAL ONCE
Status: COMPLETED | OUTPATIENT
Start: 2020-01-04 | End: 2020-01-04

## 2020-01-04 RX ADMIN — SODIUM CHLORIDE 2478 ML: 9 INJECTION, SOLUTION INTRAVENOUS at 02:13

## 2020-01-04 RX ADMIN — HYDROCODONE BITARTRATE AND ACETAMINOPHEN 1 TABLET: 5; 325 TABLET ORAL at 04:30

## 2020-01-04 RX ADMIN — PROMETHAZINE HYDROCHLORIDE 12.5 MG: 25 INJECTION INTRAMUSCULAR; INTRAVENOUS at 03:45

## 2020-01-04 RX ADMIN — LIDOCAINE HYDROCHLORIDE 5 ML: 10 INJECTION, SOLUTION EPIDURAL; INFILTRATION; INTRACAUDAL; PERINEURAL at 02:19

## 2020-01-04 RX ADMIN — CEFDINIR 300 MG: 300 CAPSULE ORAL at 04:30

## 2020-01-04 ASSESSMENT — ENCOUNTER SYMPTOMS
RHINORRHEA: 1
BACK PAIN: 0
VOMITING: 0
SORE THROAT: 1
SHORTNESS OF BREATH: 1
EYE DISCHARGE: 0
ABDOMINAL PAIN: 0
DIARRHEA: 0
COUGH: 1
NAUSEA: 0

## 2020-01-04 ASSESSMENT — PAIN SCALES - GENERAL
PAINLEVEL_OUTOF10: 7
PAINLEVEL_OUTOF10: 7

## 2020-01-04 ASSESSMENT — PAIN DESCRIPTION - PAIN TYPE: TYPE: ACUTE PAIN

## 2020-01-04 NOTE — ED PROVIDER NOTES
Skin: Negative for rash. Neurological: Negative for weakness and headaches. Hematological: Does not bruise/bleed easily. Psychiatric/Behavioral: Negative for confusion. Positives and Pertinent negatives as per HPI. Except as noted abovein the ROS, all other systems were reviewed and negative.        PAST MEDICAL HISTORY     Past Medical History:   Diagnosis Date    Anemia     Currently on Iron PO    Anxiety     Heart abnormalities     \"heart flutters real fast\"    Miscarriage 2009    Ovarian cyst     Tricuspid regurgitation          SURGICAL HISTORY     Past Surgical History:   Procedure Laterality Date    APPENDECTOMY  2006    ENDOMETRIAL ABLATION  02/01/2017    With Tubal ligation    HYSTERECTOMY      INSERTABLE CARDIAC MONITOR  05/24/2017    Loop Recorder Left Chest    OVARIAN CYST SURGERY           CURRENTMEDICATIONS       Discharge Medication List as of 1/4/2020  4:59 AM      CONTINUE these medications which have NOT CHANGED    Details   doxycycline hyclate (VIBRA-TABS) 100 MG tablet Take 1 tablet by mouth 2 times daily for 10 days, Disp-20 tablet, R-0Print      predniSONE (DELTASONE) 20 MG tablet Take 2 tablets by mouth daily for 2 days, Disp-4 tablet, R-0Print      benzonatate (TESSALON) 100 MG capsule Take 1-2 capsules by mouth 3 times daily as needed for Cough, Disp-20 capsule, R-0Print               ALLERGIES     Demerol; Meperidine; Percocet [oxycodone-acetaminophen]; and Procardia [nifedipine]    FAMILYHISTORY       Family History   Problem Relation Age of Onset    Cancer Maternal Grandmother         Thyroid    Cancer Paternal Grandmother     Arthritis Mother     Mental Retardation Maternal Uncle           SOCIAL HISTORY       Social History     Socioeconomic History    Marital status:      Spouse name: None    Number of children: None    Years of education: None    Highest education level: None   Occupational History    None   Social Needs    Financial resource 3.7 3.5 - 5.1 mmol/L    Chloride 105 99 - 110 mmol/L    CO2 21 21 - 32 mmol/L    Anion Gap 14 3 - 16    Glucose 136 (H) 70 - 99 mg/dL    BUN 15 7 - 20 mg/dL    CREATININE 0.6 0.6 - 1.1 mg/dL    GFR Non-African American >60 >60    GFR African American >60 >60    Calcium 9.8 8.3 - 10.6 mg/dL    Total Protein 7.9 6.4 - 8.2 g/dL    Alb 4.1 3.4 - 5.0 g/dL    Albumin/Globulin Ratio 1.1 1.1 - 2.2    Total Bilirubin <0.2 0.0 - 1.0 mg/dL    Alkaline Phosphatase 83 40 - 129 U/L    ALT 47 (H) 10 - 40 U/L    AST 25 15 - 37 U/L    Globulin 3.8 g/dL   Troponin   Result Value Ref Range    Troponin <0.01 <0.01 ng/mL   Brain Natriuretic Peptide   Result Value Ref Range    Pro- (H) 0 - 124 pg/mL       All other labs were within normal range ornot returned as of this dictation. EKG: All EKG's are interpreted by the Emergency Department Physician who either signs or Co-signs this chart in the absence of a cardiologist.  Please see their note for interpretation of EKG. EKG Interpretation    Interpreted by emergency department physician    Rhythm: normal sinus   Rate: normal  Axis: normal  Ectopy: none  Conduction: normal  ST Segments: normal  T Waves: normal  Q Waves: none    Clinical Impression: normal sinus rhythm      RADIOLOGY:   Non-plain film images such as CT, Ultrasound and MRI are read by the radiologist.Plain radiographic images are visualized and preliminarily interpreted by the  ED Provider with the belowfindings:    Interpretation per the Radiologist below, if available at the time of this note:    XR CHEST PORTABLE   Final Result   Previously seen left basilar airspace disease is less conspicuous, perhaps   due to slight patient rotation. No new focal airspace disease.                PROCEDURES   Unless otherwise noted below, none     Procedures    CRITICAL CARE TIME   N/A    CONSULTS:  None      EMERGENCY DEPARTMENT COURSE and DIFFERENTIAL DIAGNOSIS/MDM:   Vitals:    Vitals:    01/04/20 0110 01/04/20 1306

## 2020-01-05 LAB — S PYO THROAT QL CULT: NORMAL

## 2020-01-09 LAB
BLOOD CULTURE, ROUTINE: NORMAL
CULTURE, BLOOD 2: NORMAL

## 2020-01-10 ENCOUNTER — OFFICE VISIT (OUTPATIENT)
Dept: INTERNAL MEDICINE CLINIC | Age: 30
End: 2020-01-10

## 2020-01-10 VITALS
HEART RATE: 90 BPM | TEMPERATURE: 98.7 F | SYSTOLIC BLOOD PRESSURE: 135 MMHG | BODY MASS INDEX: 34.6 KG/M2 | DIASTOLIC BLOOD PRESSURE: 100 MMHG | WEIGHT: 188 LBS | HEIGHT: 62 IN

## 2020-01-10 PROCEDURE — 99214 OFFICE O/P EST MOD 30 MIN: CPT | Performed by: PHYSICIAN ASSISTANT

## 2020-01-10 RX ORDER — ESCITALOPRAM OXALATE 10 MG/1
10 TABLET ORAL DAILY
Qty: 30 TABLET | Refills: 2 | Status: SHIPPED | OUTPATIENT
Start: 2020-01-10 | End: 2020-05-28 | Stop reason: ALTCHOICE

## 2020-01-10 ASSESSMENT — ENCOUNTER SYMPTOMS
SHORTNESS OF BREATH: 0
COUGH: 0
SORE THROAT: 0
NAUSEA: 0
VOMITING: 0
RHINORRHEA: 0

## 2020-01-10 NOTE — PROGRESS NOTES
1729   BP: (!) 135/95 (!) 135/100   Site: Right Upper Arm Left Upper Arm   Position: Sitting Sitting   Cuff Size: Medium Adult Medium Adult   Pulse: 90    Temp: 98.7 °F (37.1 °C)    Weight: 188 lb (85.3 kg)    Height: 5' 2\" (1.575 m)        Current Medications  Current Outpatient Medications   Medication Sig Dispense Refill    escitalopram (LEXAPRO) 10 MG tablet Take 1 tablet by mouth daily 30 tablet 2    promethazine (PHENERGAN) 12.5 MG tablet Take 1 tablet by mouth 4 times daily as needed (cough) 20 tablet 0    cefdinir (OMNICEF) 300 MG capsule Take 1 capsule by mouth 2 times daily for 10 days 20 capsule 0    doxycycline hyclate (VIBRA-TABS) 100 MG tablet Take 1 tablet by mouth 2 times daily for 10 days 20 tablet 0     No current facility-administered medications for this visit.         Past Medical History  Past Medical History:   Diagnosis Date    Anemia     Currently on Iron PO    Anxiety     Heart abnormalities     \"heart flutters real fast\"    Miscarriage 2009    Ovarian cyst     Tricuspid regurgitation        Social History  Social History     Socioeconomic History    Marital status:      Spouse name: Not on file    Number of children: Not on file    Years of education: Not on file    Highest education level: Not on file   Occupational History    Not on file   Social Needs    Financial resource strain: Not on file    Food insecurity:     Worry: Not on file     Inability: Not on file    Transportation needs:     Medical: Not on file     Non-medical: Not on file   Tobacco Use    Smoking status: Never Smoker    Smokeless tobacco: Never Used   Substance and Sexual Activity    Alcohol use: No    Drug use: No    Sexual activity: Yes     Partners: Male   Lifestyle    Physical activity:     Days per week: Not on file     Minutes per session: Not on file    Stress: Not on file   Relationships    Social connections:     Talks on phone: Not on file     Gets together: Not on file Attends Bahai service: Not on file     Active member of club or organization: Not on file     Attends meetings of clubs or organizations: Not on file     Relationship status: Not on file    Intimate partner violence:     Fear of current or ex partner: Not on file     Emotionally abused: Not on file     Physically abused: Not on file     Forced sexual activity: Not on file   Other Topics Concern    Not on file   Social History Narrative    Not on file       Surgical History  Past Surgical History:   Procedure Laterality Date    APPENDECTOMY  2006    ENDOMETRIAL ABLATION  02/01/2017    With Tubal ligation    HYSTERECTOMY      INSERTABLE CARDIAC MONITOR  05/24/2017    Loop Recorder Left Chest    OVARIAN CYST SURGERY         Physical Exam  Vitals signs reviewed. Constitutional:       Appearance: She is well-developed. HENT:      Head: Normocephalic and atraumatic. Eyes:      General:         Right eye: No discharge. Left eye: No discharge. Conjunctiva/sclera: Conjunctivae normal.   Neck:      Musculoskeletal: Normal range of motion and neck supple. Cardiovascular:      Rate and Rhythm: Normal rate and regular rhythm. Pulmonary:      Effort: Pulmonary effort is normal.      Breath sounds: Normal breath sounds. No decreased breath sounds, wheezing or rhonchi. Chest:      Comments: Visual inspection of breast is normal, I did not palpate breast tissue. Musculoskeletal: Normal range of motion. Skin:     General: Skin is warm and dry. Comments: Skin is normal without evidence of rashes or lesions. Neurological:      Mental Status: She is alert and oriented to person, place, and time. Assessment/Plan     1. Generalized anxiety disorder  -Start Lexapro, return in 4 to 6 weeks for follow-up with Dr. Vinnie Smith  - escitalopram (LEXAPRO) 10 MG tablet; Take 1 tablet by mouth daily  Dispense: 30 tablet; Refill: 2    2.  Pneumonia of left lung due to infectious organism, unspecified part of lung  -She is completed 7 days of antibiotic therapy, she is afebrile and her symptoms have resolved. Unclear if the antibiotics caused the skin issue she is currently having. Stop antibiotics  -Repeat chest x-ray in 4 to 6 weeks  - XR CHEST STANDARD (2 VW); Future    3.  Elevated BP without diagnosis of hypertension  -Exercise, weight loss, return in 1 month    Elian Navarro PA-C  1/10/2020

## 2020-01-10 NOTE — PROGRESS NOTES
file     Attends [employer] service: Not on file     Active member of club or organization: Not on file     Attends meetings of clubs or organizations: Not on file     Relationship status: Not on file    Intimate partner violence:     Fear of current or ex partner: Not on file     Emotionally abused: Not on file     Physically abused: Not on file     Forced sexual activity: Not on file   Other Topics Concern    Not on file   Social History Narrative    Not on file       Surgical History  Past Surgical History:   Procedure Laterality Date    APPENDECTOMY  2006    ENDOMETRIAL ABLATION  02/01/2017    With Tubal ligation    HYSTERECTOMY      INSERTABLE CARDIAC MONITOR  05/24/2017    Loop Recorder Left Chest    OVARIAN CYST SURGERY         Physical Exam      Assessment/Plan     There are no diagnoses linked to this encounter.       Severo Spotted PA-C  1/10/2020

## 2020-02-21 ENCOUNTER — OFFICE VISIT (OUTPATIENT)
Dept: INTERNAL MEDICINE CLINIC | Age: 30
End: 2020-02-21

## 2020-02-21 VITALS
HEART RATE: 70 BPM | HEIGHT: 62 IN | BODY MASS INDEX: 34.23 KG/M2 | RESPIRATION RATE: 18 BRPM | WEIGHT: 186 LBS | SYSTOLIC BLOOD PRESSURE: 125 MMHG | DIASTOLIC BLOOD PRESSURE: 89 MMHG

## 2020-02-21 DIAGNOSIS — R79.89 ABNORMAL LFTS: ICD-10-CM

## 2020-02-21 DIAGNOSIS — F41.9 ANXIETY: ICD-10-CM

## 2020-02-21 PROBLEM — R55 SYNCOPE AND COLLAPSE: Status: RESOLVED | Noted: 2017-01-11 | Resolved: 2020-02-21

## 2020-02-21 PROBLEM — R03.0 BLOOD PRESSURE ELEVATED WITHOUT HISTORY OF HTN: Status: ACTIVE | Noted: 2020-02-21

## 2020-02-21 PROBLEM — M79.7 FIBROMYALGIA: Status: ACTIVE | Noted: 2020-02-21

## 2020-02-21 LAB
ALBUMIN SERPL-MCNC: 4.4 G/DL (ref 3.4–5)
ALP BLD-CCNC: 75 U/L (ref 40–129)
ALT SERPL-CCNC: 25 U/L (ref 10–40)
AST SERPL-CCNC: 19 U/L (ref 15–37)
BILIRUB SERPL-MCNC: 0.6 MG/DL (ref 0–1)
BILIRUBIN DIRECT: <0.2 MG/DL (ref 0–0.3)
BILIRUBIN, INDIRECT: NORMAL MG/DL (ref 0–1)
TOTAL PROTEIN: 6.9 G/DL (ref 6.4–8.2)
TSH REFLEX: 3 UIU/ML (ref 0.27–4.2)

## 2020-02-21 PROCEDURE — 99213 OFFICE O/P EST LOW 20 MIN: CPT | Performed by: INTERNAL MEDICINE

## 2020-02-21 RX ORDER — DULOXETIN HYDROCHLORIDE 20 MG/1
20 CAPSULE, DELAYED RELEASE ORAL DAILY
Qty: 30 CAPSULE | Refills: 3 | Status: SHIPPED | OUTPATIENT
Start: 2020-02-21 | End: 2020-03-12 | Stop reason: SDUPTHER

## 2020-02-21 RX ORDER — TRIAMTERENE AND HYDROCHLOROTHIAZIDE 37.5; 25 MG/1; MG/1
1 TABLET ORAL DAILY
Qty: 90 TABLET | Refills: 1 | Status: SHIPPED | OUTPATIENT
Start: 2020-02-21 | End: 2021-03-02

## 2020-02-21 ASSESSMENT — ENCOUNTER SYMPTOMS
TROUBLE SWALLOWING: 0
CONSTIPATION: 0
SHORTNESS OF BREATH: 0
COLOR CHANGE: 0
PHOTOPHOBIA: 0
CHEST TIGHTNESS: 0
BACK PAIN: 1
ABDOMINAL PAIN: 0
NAUSEA: 0

## 2020-02-21 ASSESSMENT — PATIENT HEALTH QUESTIONNAIRE - PHQ9
1. LITTLE INTEREST OR PLEASURE IN DOING THINGS: 0
2. FEELING DOWN, DEPRESSED OR HOPELESS: 0
SUM OF ALL RESPONSES TO PHQ QUESTIONS 1-9: 0
SUM OF ALL RESPONSES TO PHQ9 QUESTIONS 1 & 2: 0
SUM OF ALL RESPONSES TO PHQ QUESTIONS 1-9: 0

## 2020-02-21 NOTE — PROGRESS NOTES
Subjective:      Patient ID: Nanette Cai is a 26 y.o. female. HPI     26 y.o. female here for several complaints  Since last time, pt was dx with left sided pna, seen in Er and here for follow up . Improved cough and back to normal     Now has continued issues with left and right hand numbness at night , little finger gets numb frequently than before , improves with shaking hand. Sometimes cramping of both lateral fingers   Started new job at [employer] to report multiple somatic issues - neck pain, feet pain, low back pain for which multiple investigations have been neg so far  Reports intermittent swelling in feet by the end of the day with soreness and become tender    No recent falls. Aj Calderón has been working well with no issues    Working out to lose [employer] with 3 children and       Allergies   Allergen Reactions    Demerol Other (See Comments)     hallucinations    Meperidine Other (See Comments)    Percocet [Oxycodone-Acetaminophen] Other (See Comments)     hallucinations    Procardia [Nifedipine] Rash       Current Outpatient Medications   Medication Sig Dispense Refill    triamterene-hydrochlorothiazide (MAXZIDE-25) 37.5-25 MG per tablet Take 1 tablet by mouth daily 90 tablet 1    DULoxetine (CYMBALTA) 20 MG extended release capsule Take 1 capsule by mouth daily 30 capsule 3    escitalopram (LEXAPRO) 10 MG tablet Take 1 tablet by mouth daily 30 tablet 2     No current facility-administered medications for this visit. Review of Systems   Constitutional: Negative for activity change, diaphoresis and unexpected weight change. HENT: Negative for congestion, ear discharge, hearing loss and trouble swallowing. Eyes: Negative for photophobia and visual disturbance. Respiratory: Negative for chest tightness and shortness of breath. Cardiovascular: Negative for chest pain and palpitations.    Gastrointestinal: Negative for abdominal pain, constipation and nausea. Endocrine: Negative for cold intolerance, heat intolerance and polyuria. Genitourinary: Negative for dysuria, flank pain and hematuria. Musculoskeletal: Positive for arthralgias and back pain. Negative for gait problem. Skin: Negative for color change. Allergic/Immunologic: Negative for immunocompromised state. Neurological: Positive for numbness. Negative for tremors, seizures and weakness. Psychiatric/Behavioral: Negative for decreased concentration and sleep disturbance. The patient is not nervous/anxious. Objective:   Physical Exam    Vitals:    02/21/20 0902   BP: 125/89   Pulse:    Resp:              General: young female  Awake, alert and oriented. Appears to be not in any distress  Mucous Membranes:  Pink , anicteric  Neck: No JVD, no carotid bruit, no thyromegaly  Chest:  Clear to auscultation bilaterally, no added sounds  Cardiovascular:  RRR S1S2 heard, no murmurs or gallops  Abdomen:  Soft, undistended, non tender, no organomegaly, BS present  Extremities: No edema or cyanosis. Distal pulses well felt  Neurological : grossly normal          Wt Readings from Last 3 Encounters:   02/21/20 186 lb (84.4 kg)   01/10/20 188 lb (85.3 kg)   01/04/20 182 lb (82.6 kg)       Assessment:       Diagnosis Orders   1. Anxiety  TSH with Reflex   2. Fibromyalgia     3. Blood pressure elevated without history of HTN     4. Abnormal LFTs  HEPATIC FUNCTION PANEL           Plan:         Anxiety - improving with lexapro     Elevated BP with out dx of HTN - elevated on two different occasions - add maxzide   Which will help with her pedal edema         Tender feet, back muscles, neck muscles - workup so far with imaging neg  .  Consider fibromyalgia - add cymbalta and see  Avoid neuroleptics given young age and work schedule      Bilateral  hand numbness - likely carpal linda syndrome, use brace for now  Consider NCV- seen hand surgeon    Check TSh   Need repeat cxr for recent pna f/w =- pt

## 2020-03-13 RX ORDER — DULOXETIN HYDROCHLORIDE 20 MG/1
CAPSULE, DELAYED RELEASE ORAL
Qty: 60 CAPSULE | Refills: 2 | Status: SHIPPED | OUTPATIENT
Start: 2020-03-13 | End: 2020-05-28 | Stop reason: ALTCHOICE

## 2020-03-23 RX ORDER — AMITRIPTYLINE HYDROCHLORIDE 10 MG/1
TABLET, FILM COATED ORAL
Qty: 30 TABLET | Refills: 0 | Status: SHIPPED | OUTPATIENT
Start: 2020-03-23 | End: 2020-11-17 | Stop reason: ALTCHOICE

## 2020-03-23 NOTE — TELEPHONE ENCOUNTER
----- Message from Felice Alexander MD sent at 3/23/2020  4:20 PM EDT -----  Contact: [phone]  Elavil is next option but can make drowsy  10 mg nightly  Start half dose for 3 days, then 10 mg   ----- Message -----  From: Moses Faust  Sent: 3/23/2020   2:58 PM EDT  To: Felice Alexander MD    Pt called stating her feet are still in pain and hurting worse today and she can't walk well. It has started to affect her back for the last week. She said that she is on DULoxetine (CYMBALTA) 20 MG extended release capsule taking one pill twice daily and she is wondering if you would prescribe her something to go along with the Cymbalta. She also states that her BP was 152/113 this morning at 8:30.        [phone]    Pharmacy: [facility] [address] [facility] [phone]    Future appt-5/28/2020      SE

## 2020-03-24 ENCOUNTER — TELEPHONE (OUTPATIENT)
Dept: INTERNAL MEDICINE CLINIC | Age: 30
End: 2020-03-24

## 2020-04-24 ENCOUNTER — TELEPHONE (OUTPATIENT)
Dept: INTERNAL MEDICINE CLINIC | Age: 30
End: 2020-04-24

## 2020-04-24 NOTE — TELEPHONE ENCOUNTER
----- Message from Echo Crow MD sent at 4/24/2020 12:41 PM EDT -----  Sylvia Sotelo what else to do for helping her  Would recommend pain mx  ----- Message -----  From: Mary Sagastume  Sent: 4/24/2020  10:18 AM EDT  To: Echo Crow MD    Pt states she isn't sure if they amitriptyline is helping her fibromyalgia. Stats when she walked around the house in her house shoes she used to be able to tolerate her feet hurting. This morning her legs are giving out on her because the foot pain is so bad. Please advise.     [phone]

## 2020-05-21 ENCOUNTER — NURSE ONLY (OUTPATIENT)
Dept: CARDIOLOGY CLINIC | Age: 30
End: 2020-05-21
Payer: COMMERCIAL

## 2020-05-21 PROCEDURE — 93298 REM INTERROG DEV EVAL SCRMS: CPT | Performed by: INTERNAL MEDICINE

## 2020-05-21 PROCEDURE — G2066 INTER DEVC REMOTE 30D: HCPCS | Performed by: INTERNAL MEDICINE

## 2020-05-28 ENCOUNTER — TELEMEDICINE (OUTPATIENT)
Dept: INTERNAL MEDICINE CLINIC | Age: 30
End: 2020-05-28

## 2020-05-28 PROCEDURE — 99212 OFFICE O/P EST SF 10 MIN: CPT | Performed by: INTERNAL MEDICINE

## 2020-05-28 RX ORDER — CELECOXIB 100 MG/1
100 CAPSULE ORAL 2 TIMES DAILY
Qty: 60 CAPSULE | Refills: 0 | Status: SHIPPED | OUTPATIENT
Start: 2020-05-28 | End: 2021-01-28 | Stop reason: ALTCHOICE

## 2020-05-28 NOTE — PROGRESS NOTES
obtained by patient/caregiver or practitioner observation)    Blood pressure-  Heart rate-    Respiratory rate-    Temperature-  Pulse oximetry-     Constitutional: [x] Appears well-developed and well-nourished [x] No apparent distress      [] Abnormal-   Mental status  [x] Alert and awake  [x] Oriented to person/place/time [x]Able to follow commands      Eyes:  EOM    [x]  Normal  [] Abnormal-  Sclera  [x]  Normal  [] Abnormal -         Discharge []  None visible  [] Abnormal -    HENT:   [x] Normocephalic, atraumatic. [] Abnormal   [x] Mouth/Throat: Mucous membranes are moist.     External Ears [x] Normal  [] Abnormal-     Neck: [x] No visualized mass     Pulmonary/Chest: [x] Respiratory effort normal.  [] No visualized signs of difficulty breathing or respiratory distress        [] Abnormal-      Musculoskeletal:   [] Normal gait with no signs of ataxia         [x] Normal range of motion of neck        [] Abnormal-       Neurological:        [x] No Facial Asymmetry (Cranial nerve 7 motor function) (limited exam to video visit)          [x] No gaze palsy        [] Abnormal-         Skin:        [] No significant exanthematous lesions or discoloration noted on facial skin         [] Abnormal-            Psychiatric:       [x] Normal Affect [x] No Hallucinations        [] Abnormal-           Other pertinent observable physical exam findings-     ASSESSMENT/PLAN:     Diagnosis Orders   1. Benign essential HTN     2. Fibromyalgia     3. Primary insomnia       Elevated BP - noted on previous visit- placed on maxzide. Need to recheck     Fibromyalgia - multiple somatic complaints - previous investigations have been neg - mulitple meds did not help  - trial of celebrex    Insomnia - elavil caused too much sleep    Need in office visit for foot exam      Return in about 3 months (around 8/28/2020) for BP check.     Jason Flores is a 26 y.o. female being evaluated by a Virtual Visit (video visit) encounter to address

## 2020-06-22 ENCOUNTER — NURSE ONLY (OUTPATIENT)
Dept: CARDIOLOGY CLINIC | Age: 30
End: 2020-06-22
Payer: COMMERCIAL

## 2020-06-22 PROCEDURE — 93298 REM INTERROG DEV EVAL SCRMS: CPT | Performed by: INTERNAL MEDICINE

## 2020-06-22 PROCEDURE — G2066 INTER DEVC REMOTE 30D: HCPCS | Performed by: INTERNAL MEDICINE

## 2020-06-23 NOTE — PROGRESS NOTES
Remote interrogation of implanted cardiac event monitor shows normal function. Dx syncope, palpitations, hx ST.(toprol xl was discontinued on med rec 12/2019 when pt went to ER). Tachy recordings appear to be ST w/ rates 154-162 bpm, longest 3.5 min  and artifact noted. No symptom episodes recorded. Follow up 1 month via [facility].

## 2020-07-27 ENCOUNTER — NURSE ONLY (OUTPATIENT)
Dept: CARDIOLOGY CLINIC | Age: 30
End: 2020-07-27
Payer: COMMERCIAL

## 2020-07-27 PROCEDURE — 93298 REM INTERROG DEV EVAL SCRMS: CPT | Performed by: INTERNAL MEDICINE

## 2020-07-27 PROCEDURE — G2066 INTER DEVC REMOTE 30D: HCPCS | Performed by: INTERNAL MEDICINE

## 2020-07-27 NOTE — PROGRESS NOTES
Remote interrogation of implanted cardiac event monitor shows normal function. Patient has a history of ST. Patient's last device interrogation was on 6/23. Since last interrogation, 34 tachy events recorded-6 EGMs available. V rates range 154-162 bpm. EGMs appear to show ST with artifact noted at baseline occasionally. See [facility] report under the Cardiology tab. Follow up 1 month via [facility].

## 2020-08-17 ENCOUNTER — OFFICE VISIT (OUTPATIENT)
Dept: INTERNAL MEDICINE CLINIC | Age: 30
End: 2020-08-17

## 2020-08-17 VITALS
DIASTOLIC BLOOD PRESSURE: 80 MMHG | RESPIRATION RATE: 14 BRPM | HEART RATE: 85 BPM | SYSTOLIC BLOOD PRESSURE: 122 MMHG | BODY MASS INDEX: 34.96 KG/M2 | HEIGHT: 62 IN | WEIGHT: 190 LBS

## 2020-08-17 PROCEDURE — 99213 OFFICE O/P EST LOW 20 MIN: CPT | Performed by: NURSE PRACTITIONER

## 2020-08-17 ASSESSMENT — ENCOUNTER SYMPTOMS
COUGH: 0
ABDOMINAL PAIN: 0
SHORTNESS OF BREATH: 0
NAUSEA: 0
DIARRHEA: 0
VOMITING: 0

## 2020-08-17 NOTE — PROGRESS NOTES
Chief Complaint:   Juan Pablo Campos is a 26 y.o. female who presents for   Chief Complaint   Patient presents with    Pain      HPI    Patient recently had right ovary removed on 7/7/2020. She presents today with c/o hip pain. It's been ongoing for years. She has been trying to walk more to lose weight. When she's walking, her hips feel like they are going to detach. States it feels like pressure. It gets really bad. Also c/o left foot pain. Ongoing for 4-5 years. Radiates to her ankle. She states it hurts for her  to rub her foot. She has tried Tylenol/Ibuprofen without relief. It is worse in the morning when she wakes up and when she gets home and rests. Review of Systems  Review of Systems   Constitutional: Negative for chills and fever. Respiratory: Negative for cough and shortness of breath. Cardiovascular: Negative for chest pain. Gastrointestinal: Negative for abdominal pain, diarrhea, nausea and vomiting. Musculoskeletal: Positive for arthralgias (hip pain). Left foot pain         Allergies  Demerol; Meperidine; Percocet [oxycodone-acetaminophen]; and Procardia [nifedipine]      Vitals  /80 (Site: Left Upper Arm, Position: Sitting)   Pulse 85   Resp 14   Ht 5' 2\" (1.575 m)   Wt 190 lb (86.2 kg)   LMP 08/07/2017 Comment: hyst 08/2017  BMI 34.75 kg/m²     Current Medications  Current Outpatient Medications   Medication Sig Dispense Refill    celecoxib (CELEBREX) 100 MG capsule Take 1 capsule by mouth 2 times daily 60 capsule 0    amitriptyline (ELAVIL) 10 MG tablet Take 1/2 tablet by mouth for the first 3 nights, then take 1 tablet by mouth nightly. 30 tablet 0    triamterene-hydrochlorothiazide (MAXZIDE-25) 37.5-25 MG per tablet Take 1 tablet by mouth daily 90 tablet 1     No current facility-administered medications for this visit.         Past Medical History  Past Medical History:   Diagnosis Date    Anemia     Currently on Iron PO    Anxiety     Heart abnormalities     \"heart flutters real fast\"    Miscarriage 2009    Ovarian cyst     Tricuspid regurgitation        Social History  Social History     Socioeconomic History    Marital status:      Spouse name: Not on file    Number of children: Not on file    Years of education: Not on file    Highest education level: Not on file   Occupational History    Not on file   Social Needs    Financial resource strain: Not on file    Food insecurity     Worry: Not on file     Inability: Not on file    Transportation needs     Medical: Not on file     Non-medical: Not on file   Tobacco Use    Smoking status: Never Smoker    Smokeless tobacco: Never Used   Substance and Sexual Activity    Alcohol use: No    Drug use: No    Sexual activity: Yes     Partners: Male   Lifestyle    Physical activity     Days per week: Not on file     Minutes per session: Not on file    Stress: Not on file   Relationships    Social connections     Talks on phone: Not on file     Gets together: Not on file     Attends Lutheran service: Not on file     Active member of club or organization: Not on file     Attends meetings of clubs or organizations: Not on file     Relationship status: Not on file    Intimate partner violence     Fear of current or ex partner: Not on file     Emotionally abused: Not on file     Physically abused: Not on file     Forced sexual activity: Not on file   Other Topics Concern    Not on file   Social History Narrative    Not on file       SurgicalHistory  Past Surgical History:   Procedure Laterality Date    APPENDECTOMY  2006    ENDOMETRIAL ABLATION  02/01/2017    With Tubal ligation    HYSTERECTOMY      [address]  05/24/2017    Loop Recorder Left Chest    OVARIAN CYST SURGERY         Physical Exam  Physical Exam  Constitutional:       Appearance: Normal appearance.    Eyes:      Conjunctiva/sclera: Conjunctivae normal.      Pupils: Pupils are equal, round, and reactive to light. Cardiovascular:      Rate and Rhythm: Normal rate and regular rhythm. Pulses: Normal pulses. Heart sounds: Normal heart sounds. No murmur. No friction rub. No gallop. Pulmonary:      Effort: Pulmonary effort is normal.      Breath sounds: Normal breath sounds. No wheezing, rhonchi or rales. Musculoskeletal:         General: No swelling or tenderness. Right lower leg: No edema. Left lower leg: No edema. Skin:     General: Skin is warm and dry. Capillary Refill: Capillary refill takes less than 2 seconds. Findings: No erythema. Neurological:      General: No focal deficit present. Mental Status: She is alert and oriented to person, place, and time. Motor: No weakness. Psychiatric:         Mood and Affect: Mood normal.         Behavior: Behavior normal.         Thought Content: Thought content normal.         Judgment: Judgment normal.         Assessment and Plan    Left foot pain  - check X-ray. This was not done. Will F/u on results. Hip pain  - Referral to Rheumatology.      Reyes SHAW  8/17/2020

## 2020-08-22 ENCOUNTER — APPOINTMENT (OUTPATIENT)
Dept: GENERAL RADIOLOGY | Age: 30
End: 2020-08-22
Payer: COMMERCIAL

## 2020-08-22 ENCOUNTER — HOSPITAL ENCOUNTER (EMERGENCY)
Age: 30
Discharge: HOME | End: 2020-08-22
Payer: COMMERCIAL

## 2020-08-22 VITALS
OXYGEN SATURATION: 100 % | TEMPERATURE: 97.2 F | DIASTOLIC BLOOD PRESSURE: 70 MMHG | SYSTOLIC BLOOD PRESSURE: 117 MMHG | BODY MASS INDEX: 34.96 KG/M2 | WEIGHT: 190 LBS | HEART RATE: 84 BPM | HEIGHT: 62 IN | RESPIRATION RATE: 12 BRPM

## 2020-08-22 PROCEDURE — 99283 EMERGENCY DEPT VISIT LOW MDM: CPT

## 2020-08-22 PROCEDURE — 6370000000 HC RX 637 (ALT 250 FOR IP): Performed by: PHYSICIAN ASSISTANT

## 2020-08-22 PROCEDURE — 73630 X-RAY EXAM OF FOOT: CPT

## 2020-08-22 RX ORDER — PREDNISONE 20 MG/1
40 TABLET ORAL ONCE
Status: COMPLETED | OUTPATIENT
Start: 2020-08-22 | End: 2020-08-22

## 2020-08-22 RX ORDER — METHYLPREDNISOLONE 4 MG/1
4 TABLET ORAL SEE ADMIN INSTRUCTIONS
Qty: 1 KIT | Refills: 0 | Status: SHIPPED | OUTPATIENT
Start: 2020-08-22 | End: 2020-08-28

## 2020-08-22 RX ADMIN — PREDNISONE 40 MG: 20 TABLET ORAL at 21:52

## 2020-08-22 ASSESSMENT — PAIN DESCRIPTION - LOCATION: LOCATION: FOOT

## 2020-08-22 ASSESSMENT — ENCOUNTER SYMPTOMS
VOMITING: 0
SORE THROAT: 0
SHORTNESS OF BREATH: 0
THROAT SWELLING: 0

## 2020-08-22 ASSESSMENT — PAIN DESCRIPTION - ORIENTATION: ORIENTATION: RIGHT

## 2020-08-22 ASSESSMENT — PAIN DESCRIPTION - PAIN TYPE: TYPE: ACUTE PAIN

## 2020-08-22 ASSESSMENT — PAIN SCALES - GENERAL: PAINLEVEL_OUTOF10: 7

## 2020-08-22 ASSESSMENT — PAIN DESCRIPTION - FREQUENCY: FREQUENCY: CONTINUOUS

## 2020-08-22 ASSESSMENT — PAIN DESCRIPTION - PROGRESSION: CLINICAL_PROGRESSION: GRADUALLY WORSENING

## 2020-08-22 ASSESSMENT — PAIN DESCRIPTION - ONSET: ONSET: GRADUAL

## 2020-08-22 ASSESSMENT — PAIN DESCRIPTION - DESCRIPTORS: DESCRIPTORS: ACHING;SHARP;SHOOTING

## 2020-08-23 NOTE — ED TRIAGE NOTES
Chief Complaint   Patient presents with    Foot Pain     pt states has been seeing PCP for pain in lf foot/heel.  Rash     pt c/o rash on neck down to lf collarbone and on rt side that has been going on for 2 weeks.

## 2020-08-23 NOTE — ED PROVIDER NOTES
[address] [facility]  EMERGENCY DEPARTMENT ENCOUNTER        Pt Name: Alexander Van  MRN: [id number]  [place] 1990  Date of evaluation: 8/22/2020  Provider: Kuldeep Sood PA-C  PCP: Татьяна Chapman MD    Shared Visit or Autonomous Visit: Evaluation by MERLYN. My supervising physician was available for consultation. CHIEF COMPLAINT       Chief Complaint   Patient presents with    Foot Pain     pt states has been seeing PCP for pain in lf foot/heel.  Rash     pt c/o rash on neck down to lf collarbone and on rt side that has been going on for 2 weeks. HISTORY OF PRESENT ILLNESS   (Location/Symptom, Timing/Onset, Context/Setting, Quality, Duration, Modifying Factors, Severity)  Note limiting factors. Alexander Van is a 26 y.o. female presenting to the emergency department with complaint of left foot pain for the past 1 year pain is at her heel and the bottom of her foot states she has been walking on her lunch breaks at work to lose weight and the more she walks and puts pressure on her foot and heel causing increased pain and now get shooting pains up her leg and into her hips as well. She saw her family doctor this past week for her foot and was referred to a rheumatologist she has an appointment scheduled next week on Thursday. Also has a second complaint of itchy rash at her neck and trunk for the past 2 weeks. No known exposures. The history is provided by the patient. Foot Problem   Location:  Foot  Time since incident: 1 year.   Injury: no    Foot location:  L foot  Chronicity:  New  Worsened by:  Bearing weight and activity  Associated symptoms: no fever and no numbness    Rash   Location:  Torso and head/neck  Quality: itchiness    Quality: not blistering, not draining and not painful    Onset quality:  Gradual  Duration:  2 weeks  Progression:  Unchanged  Chronicity:  New  Context: not exposure to similar rash and not plant contact    Associated  Mental Retardation Maternal Uncle           SOCIAL HISTORY       Social History     Socioeconomic History    Marital status:      Spouse name: Not on file    Number of children: Not on file    Years of education: Not on file    Highest education level: Not on file   Occupational History    Not on file   Social Needs    Financial resource strain: Not on file    Food insecurity     Worry: Not on file     Inability: Not on file    Transportation needs     Medical: Not on file     Non-medical: Not on file   Tobacco Use    Smoking status: Never Smoker    Smokeless tobacco: Never Used   Substance and Sexual Activity    Alcohol use: No    Drug use: No    Sexual activity: Yes     Partners: Male   Lifestyle    Physical activity     Days per week: Not on file     Minutes per session: Not on file    Stress: Not on file   Relationships    Social connections     Talks on phone: Not on file     Gets together: Not on file     Attends Buddhism service: Not on file     Active member of club or organization: Not on file     Attends meetings of clubs or organizations: Not on file     Relationship status: Not on file    Intimate partner violence     Fear of current or ex partner: Not on file     Emotionally abused: Not on file     Physically abused: Not on file     Forced sexual activity: Not on file   Other Topics Concern    Not on file   Social History Narrative    Not on file       SCREENINGS    Vannessa Coma Scale  Eye Opening: Spontaneous  Best Verbal Response: Oriented  Best Motor Response: Obeys commands  Vannessa Coma Scale Score: 15        PHYSICAL EXAM    (up to 7 for level 4, 8 or more for level 5)     ED Triage Vitals [08/22/20 2001]   BP Temp Temp Source Pulse Resp SpO2 Height Weight   (!) 138/91 97.2 °F (36.2 °C) Oral 95 16 100 % 5' 2\" (1.575 m) 190 lb (86.2 kg)       Physical Exam  Vitals signs and nursing note reviewed. Constitutional:       Appearance: She is well-developed.  She is not ill-appearing or toxic-appearing. HENT:      Head: Normocephalic and atraumatic. Mouth/Throat:      Mouth: Mucous membranes are moist.      Pharynx: Oropharynx is clear. No posterior oropharyngeal erythema. Eyes:      Conjunctiva/sclera: Conjunctivae normal.   Cardiovascular:      Rate and Rhythm: Normal rate and regular rhythm. Pulses: Normal pulses. Dorsalis pedis pulses are 2+ on the left side. Posterior tibial pulses are 2+ on the left side. Heart sounds: Normal heart sounds. Pulmonary:      Effort: Pulmonary effort is normal. No respiratory distress. Breath sounds: Normal breath sounds. No stridor. No wheezing, rhonchi or rales. Musculoskeletal:      Left foot: Normal capillary refill. Tenderness present. No crepitus or deformity. Feet:    Skin:     General: Skin is warm and dry. Capillary Refill: Capillary refill takes less than 2 seconds. Findings: Rash present. No petechiae. Rash is papular. Rash is not purpuric. Comments: Mild scattered papular erythematous rash at right and left lateral neck left upper chest right abdomen and back. Neurological:      Mental Status: She is alert and oriented to person, place, and time. Sensory: Sensation is intact. Motor: Motor function is intact. No abnormal muscle tone. Psychiatric:         Behavior: Behavior normal.         DIAGNOSTIC RESULTS   LABS:    Labs Reviewed - No data to display    All other labs were within normal range or not returned as of this dictation. EKG: All EKG's are interpreted by the Emergency Department Physician in the absence of a cardiologist.  Please see their note for interpretation of EKG.       RADIOLOGY:   Non-plain film images such as CT, Ultrasound and MRI are read by the radiologist. Plain radiographic images are visualized andpreliminarily interpreted by the  ED Provider with the below findings:        Interpretation perthe Radiologist below, if available at the time of this note:    XR FOOT LEFT (MIN 3 VIEWS)   Final Result   No acute finding in the left foot. Xr Foot Left (min 3 Views)    Result Date: 8/22/2020  EXAMINATION: THREE XRAY VIEWS OF THE LEFT FOOT 8/22/2020 8:30 pm COMPARISON: None. HISTORY: ORDERING SYSTEM PROVIDED HISTORY: pain TECHNOLOGIST PROVIDED HISTORY: Reason for exam:->pain Reason for Exam: no injury just pain FINDINGS: Skeletal structures are intact. No fracture or dislocation. No significant soft tissue abnormalities. No acute finding in the left foot. PROCEDURES   Unless otherwise noted below, none     Procedures    CRITICAL CARE TIME   N/A    CONSULTS:  None      EMERGENCY DEPARTMENT COURSE and DIFFERENTIAL DIAGNOSIS/MDM:   Vitals:    Vitals:    08/22/20 2001 08/22/20 2155   BP: (!) 138/91 117/70   Pulse: 95 84   Resp: 16 12   Temp: 97.2 °F (36.2 °C)    TempSrc: Oral    SpO2: 100% 100%   Weight: 190 lb (86.2 kg)    Height: 5' 2\" (1.575 m)        Patient was given thefollowing medications:  Medications   predniSONE (DELTASONE) tablet 40 mg (40 mg Oral Given 8/22/20 2152)       9:51 PM EDT  Patient with chronic heel pain for 1 year has been worsening x-ray was obtained no fracture. She has a heel spur. Suspect tendinitis or fasciitis. She is also here with complaint of itchy rash unrelated suspect contact dermatitis. She is placed on steroid pack. She is referred to podiatry for foot pain. Patient discharged home in good condition advise returning for any worsening she understands and agrees. I estimate there is LOW risk for FRACTURE, COMPARTMENT SYNDROME, DEEP VENOUS THROMBOSIS, SEPTIC ARTHRITIS, TENDON OR NEUROVASCULAR INJURY, thus I consider the discharge disposition reasonable. FINAL IMPRESSION      1. Pain of left heel    2. Heel spur, left    3.  Dermatitis          DISPOSITION/PLAN   DISPOSITION     PATIENT REFERREDTO:  Eldon Banda DPM  [address], [address] [place] [id number]  [phone]    Schedule an appointment as soon as possible for a visit       [address], [address]  [address]  [phone]    Schedule an appointment as soon as possible for a visit       [place] [facility] [place] [facility] [place] [facility]  [address] [facility]  [phone]    If symptoms worsen      DISCHARGE MEDICATIONS:  Discharge Medication List as of 8/22/2020 10:00 PM      START taking these medications    Details   methylPREDNISolone (MEDROL, JUANCARLOS,) 4 MG tablet Take 1 tablet by mouth See Admin Instructions for 6 days Take by mouth., Disp-1 kit,R-0Print             DISCONTINUED MEDICATIONS:  Discharge Medication List as of 8/22/2020 10:00 PM                 (Please note that portions ofthis note were completed with a voice recognition program.  Efforts were made to edit the dictations but occasionally words are mis-transcribed.)    Sunni Friedman PA-C (electronically signed)            Anat Soto PA-C  08/23/20 0158

## 2020-08-28 ENCOUNTER — TELEPHONE (OUTPATIENT)
Dept: INTERNAL MEDICINE CLINIC | Age: 30
End: 2020-08-28

## 2020-09-02 ENCOUNTER — OFFICE VISIT (OUTPATIENT)
Dept: INTERNAL MEDICINE CLINIC | Age: 30
End: 2020-09-02

## 2020-09-02 VITALS
SYSTOLIC BLOOD PRESSURE: 142 MMHG | HEART RATE: 114 BPM | DIASTOLIC BLOOD PRESSURE: 94 MMHG | WEIGHT: 188 LBS | OXYGEN SATURATION: 98 % | BODY MASS INDEX: 34.6 KG/M2 | HEIGHT: 62 IN | RESPIRATION RATE: 18 BRPM

## 2020-09-02 PROCEDURE — 99213 OFFICE O/P EST LOW 20 MIN: CPT | Performed by: PHYSICIAN ASSISTANT

## 2020-09-02 RX ORDER — NYSTATIN 100000 U/G
CREAM TOPICAL
Qty: 15 G | Refills: 0 | Status: SHIPPED | OUTPATIENT
Start: 2020-09-02 | End: 2021-01-28 | Stop reason: ALTCHOICE

## 2020-09-02 RX ORDER — HYDROXYZINE PAMOATE 25 MG/1
25 CAPSULE ORAL 2 TIMES DAILY PRN
Qty: 30 CAPSULE | Refills: 0 | Status: SHIPPED | OUTPATIENT
Start: 2020-09-02 | End: 2021-01-28 | Stop reason: ALTCHOICE

## 2020-09-02 ASSESSMENT — ENCOUNTER SYMPTOMS
CHEST TIGHTNESS: 0
ABDOMINAL PAIN: 0
COUGH: 0
NAUSEA: 0
EYE PAIN: 0
SHORTNESS OF BREATH: 0
VOMITING: 0

## 2020-09-02 NOTE — PROGRESS NOTES
Chief Complaint:   Heydi Cochran is a 26 y.o. female who presents for   Chief Complaint   Patient presents with    Rash       HPI:    HPI    Heydi Cochran is a 26 y.o. female with anxiety and anemia who presents to the office today for a rash. Patient reports that this has been ongoing for a while. She was seen in the ER and started on steroids and reports the rash got a little bit better but never resolved. She noticed the rash worsening when she has increased stress and anxiety at work. 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 She gets hot, itchy and breaks out in a sweat when this happens. She has been noticing the rash worst in areas where she is sweating such as her neck, scalp, armpits, lower back and groin. She reports the rash is very itchy. She denies any sore throat, fevers, cough, chest pain. She has no known allergies and denies any new exposures. Review of Systems  Review of Systems   Constitutional: Negative for chills and fever. HENT: Negative for mouth sores. Eyes: Negative for pain. Respiratory: Negative for cough, chest tightness and shortness of breath. Cardiovascular: Negative for chest pain and leg swelling. Gastrointestinal: Negative for abdominal pain, nausea and vomiting. Musculoskeletal: Negative for arthralgias and myalgias. Skin: Positive for rash. Psychiatric/Behavioral: The patient is nervous/anxious. All other systems reviewed and are negative.         Allergies  Demerol; Meperidine; Percocet [oxycodone-acetaminophen]; and Procardia [nifedipine]      Vitals  BP (!) 142/94 (Site: Left Upper Arm, Position: Sitting)   Pulse 114   Resp 18   Ht 5' 2\" (1.575 m)   Wt 188 lb (85.3 kg)   LMP 08/07/2017 Comment: hyst 08/2017  SpO2 98%   BMI 34.39 kg/m²     Current Medications  Current Outpatient Medications   Medication Sig Dispense Refill    nystatin (MYCOSTATIN) 371363 UNIT/GM cream Apply topically 2 times daily. 15 g 0    hydrOXYzine (VISTARIL) 25 MG capsule Take 1 capsule by mouth 2 times daily as needed for Itching or Anxiety 30 capsule 0    celecoxib (CELEBREX) 100 MG capsule Take 1 capsule by mouth 2 times daily 60 capsule 0    amitriptyline (ELAVIL) 10 MG tablet Take 1/2 tablet by mouth for the first 3 nights, then take 1 tablet by mouth nightly. 30 tablet 0    triamterene-hydrochlorothiazide (MAXZIDE-25) 37.5-25 MG per tablet Take 1 tablet by mouth daily 90 tablet 1     No current facility-administered medications for this visit.         Past Medical History  Past Medical History:   Diagnosis Date    Anemia     Currently on Iron PO    Anxiety     Heart abnormalities     \"heart flutters real fast\"    Miscarriage 2009    Ovarian cyst     Tricuspid regurgitation        Social History  Social History     Socioeconomic History    Marital status:      Spouse name: Not on file    Number of children: Not on file    Years of education: Not on file    Highest education level: Not on file   Occupational History    Not on file   Social Needs    Financial resource strain: Not on file    Food insecurity     Worry: Not on file     Inability: Not on file    Transportation needs     Medical: Not on file     Non-medical: Not on file   Tobacco Use    Smoking status: Never Smoker    Smokeless tobacco: Never Used   Substance and Sexual Activity    Alcohol use: No    Drug use: No    Sexual activity: Yes     Partners: Male   Lifestyle    Physical activity     Days per week: Not on file     Minutes per session: Not on file    Stress: Not on file   Relationships    Social connections     Talks on phone: Not on file     Gets together: Not on file     Attends Latter-day service: Not on file     Active member of club or organization: Not on file     Attends meetings of clubs or organizations: Not on file     Relationship status: Not on file    Intimate partner violence     Fear of current MG capsule; Take 1 capsule by mouth 2 times daily as needed for Itching or Anxiety  Dispense: 30 capsule; Refill: 0      Providing work note for extra breaks to help with stress/anxiety for now per patients employers recommendations    Return in about 6 months (around 3/2/2021).

## 2020-09-08 ENCOUNTER — TELEPHONE (OUTPATIENT)
Dept: INTERNAL MEDICINE CLINIC | Age: 30
End: 2020-09-08

## 2020-09-09 ENCOUNTER — TELEPHONE (OUTPATIENT)
Dept: INTERNAL MEDICINE CLINIC | Age: 30
End: 2020-09-09

## 2020-09-09 NOTE — TELEPHONE ENCOUNTER
[facility]

## 2020-09-09 NOTE — TELEPHONE ENCOUNTER
----- Message from Jesenia Pham MD sent at 9/9/2020 11:59 AM EDT -----  Contact: [phone]  Please order covid  ----- Message -----  From: Aydee Zavala  Sent: 9/9/2020   9:57 AM EDT  To: Jesenia Pham MD    Patient was advised to be tested for COVID if fever develops. She has a fever now. She needs an order to get a COVID test.  Last appt. (with Cyndee Dill) 09/16/89.     [phone]

## 2020-09-10 ENCOUNTER — OFFICE VISIT (OUTPATIENT)
Dept: PRIMARY CARE CLINIC | Age: 30
End: 2020-09-10
Payer: COMMERCIAL

## 2020-09-10 ENCOUNTER — TELEPHONE (OUTPATIENT)
Dept: INTERNAL MEDICINE CLINIC | Age: 30
End: 2020-09-10

## 2020-09-10 PROCEDURE — 99211 OFF/OP EST MAY X REQ PHY/QHP: CPT | Performed by: NURSE PRACTITIONER

## 2020-09-10 NOTE — TELEPHONE ENCOUNTER
----- Message from Won Elias sent at 9/10/2020  1:24 PM EDT -----  Contact: [phone]  Can you please let her know to scheduled with dermatology and provide her with some referral options? She can continue the Vistaril as prescribed. If the rash is still uncomfortable, she can trial an over the counter steroid cream if she feels the current cream is not helping until she sees dermatology. Thanks  ----- Message -----  From: Eden Drew  Sent: 9/10/2020  11:51 AM EDT  To: WALTER Elias    Pt called because she saw you on 9/2 for a rash and you prescribed nystatin cream and  hydrOXYzine (VISTARIL) 25 MG capsule. She began taking the medication on 9/3 and she states the rash is worse. The rash is all over her neck, under her armpits, on her breasts, chest ,stomach and back. [phone]  Pharmacy-[facility]

## 2020-09-10 NOTE — PATIENT INSTRUCTIONS
Advance Care Planning  People with COVID-19 may have no symptoms, mild symptoms, such as fever, cough, and shortness of breath or they may have more severe illness, developing severe and fatal pneumonia. As a result, Advance Care Planning with attention to naming a health care decision maker (someone you trust to make healthcare decisions for you if you could not speak for yourself) and sharing other health care preferences is important BEFORE a possible health crisis. Please contact your Primary Care Provider to discuss Advance Care Planning. Preventing the Spread of Coronavirus Disease 2019 in Homes and Residential Communities  For the most recent information go to [email or web address]    Prevention steps for People with confirmed or suspected COVID-19 (including persons under investigation) who do not need to be hospitalized  and   People with confirmed COVID-19 who were hospitalized and determined to be medically stable to go home    Your healthcare provider and public health staff will evaluate whether you can be cared for at home. If it is determined that you do not need to be hospitalized and can be isolated at home, you will be monitored by staff from your local or state health department. You should follow the prevention steps below until a healthcare provider or local or state health department says you can return to your normal activities. Stay home except to get medical care  People who are mildly ill with COVID-19 are able to isolate at home during their illness. You should restrict activities outside your home, except for getting medical care. Do not go to work, school, or public areas. Avoid using public transportation, ride-sharing, or taxis. Separate yourself from other people and animals in your home  People: As much as possible, you should stay in a specific room and away from other people in your home.  Also, you should use a separate have a medical emergency and need to call 911, notify the dispatch personnel that you have, or are being evaluated for COVID-19. If possible, put on a facemask before emergency medical services arrive. Discontinuing home isolation  Patients with confirmed COVID-19 should remain under home isolation precautions until the risk of secondary transmission to others is thought to be low. The decision to discontinue home isolation precautions should be made on a case-by-case basis, in consultation with healthcare providers and state and local health departments.

## 2020-09-11 ENCOUNTER — TELEPHONE (OUTPATIENT)
Dept: INTERNAL MEDICINE CLINIC | Age: 30
End: 2020-09-11

## 2020-09-11 NOTE — TELEPHONE ENCOUNTER
----- Message from Won Pedroza sent at 9/11/2020 11:58 AM EDT -----  Contact: [phone]  Yes, if she feels it is not working, stop the nystatin.   ----- Message -----  From: Feliciano Vaughan  Sent: 9/11/2020   9:03 AM EDT  To: WALTER Pedroza    Pt wanting to know if she should discontinue nystatin cream  ----- Message -----  From: WALTER Pedroza  Sent: 9/10/2020   5:59 PM EDT  To: Feliciano Vaughan    Yes, continue Vistaril as needed. It can be taken for itching or anxiety. Hydrocortisone cream would be fine  ----- Message -----  From: Feliciano Vaughan  Sent: 9/10/2020   1:32 PM EDT  To: WALTER Pedroza    Pt wanting to know if she should continue the vistaril bid? Also wanting to know if there is a specific OTC steroid cream?  ----- Message -----  From: WALTER Pedroza  Sent: 9/10/2020   1:24 PM EDT  To: Feliciano Vaughan    Can you please let her know to scheduled with dermatology and provide her with some referral options? She can continue the Vistaril as prescribed. If the rash is still uncomfortable, she can trial an over the counter steroid cream if she feels the current cream is not helping until she sees dermatology. Thanks  ----- Message -----  From: Seamus Drew  Sent: 9/10/2020  11:51 AM EDT  To: WALTER Pedroza    Pt called because she saw you on 9/2 for a rash and you prescribed nystatin cream and  hydrOXYzine (VISTARIL) 25 MG capsule. She began taking the medication on 9/3 and she states the rash is worse. The rash is all over her neck, under her armpits, on her breasts, chest ,stomach and back. [phone]  [facility]

## 2020-09-11 NOTE — TELEPHONE ENCOUNTER
----- Message from Bhavik De La Paz [place] sent at 9/10/2020  5:59 PM EDT -----  Contact: [phone]  Yes, continue Vistaril as needed. It can be taken for itching or anxiety. Hydrocortisone cream would be fine  ----- Message -----  From: Paul Gottlieb  Sent: 9/10/2020   1:32 PM EDT  To: WALTER Bellamy    Pt wanting to know if she should continue the vistaril bid? Also wanting to know if there is a specific OTC steroid cream?  ----- Message -----  From: WALTER Bellamy  Sent: 9/10/2020   1:24 PM EDT  To: Paul Gottlieb    Can you please let her know to scheduled with dermatology and provide her with some referral options? She can continue the Vistaril as prescribed. If the rash is still uncomfortable, she can trial an over the counter steroid cream if she feels the current cream is not helping until she sees dermatology. Thanks  ----- Message -----  From: Micah Drew  Sent: 9/10/2020  11:51 AM EDT  To: WALTER Bellamy    Pt called because she saw you on 9/2 for a rash and you prescribed nystatin cream and  hydrOXYzine (VISTARIL) 25 MG capsule. She began taking the medication on 9/3 and she states the rash is worse. The rash is all over her neck, under her armpits, on her breasts, chest ,stomach and back. [phone]  Pharmacy-[facility]

## 2020-09-12 LAB — SARS-COV-2, NAA: NOT DETECTED

## 2020-09-14 NOTE — RESULT ENCOUNTER NOTE
Your test for COVID-19, also known as novel coronavirus, came back negative/ not detected. No virus was detected from the sample collected. Testing is not 100%. Until your symptoms are fully resolved, you may still be contagious. We recommend that you remain isolated for 7 days minimum or 24-48 hours after your symptoms have completely resolved, whichever is longer. If you were exposed to a known positive COVID-19 patient, then you must remain quaratined for 14 days. If you were tested for an upcoming procedue, then you should remain in quaratine until your procedure. Continually monitor symptoms. Contact a medical provider if symptoms are worsening. If you have any additional questions, contact your PCP.     For additional information, please visit the Centers for Disease Control and Prevention [email or web address]

## 2020-11-17 ENCOUNTER — TELEMEDICINE (OUTPATIENT)
Dept: INTERNAL MEDICINE CLINIC | Age: 30
End: 2020-11-17

## 2020-11-17 PROBLEM — R60.0 PEDAL EDEMA: Status: ACTIVE | Noted: 2020-11-17

## 2020-11-17 PROBLEM — F51.01 PRIMARY INSOMNIA: Status: RESOLVED | Noted: 2017-05-08 | Resolved: 2020-11-17

## 2020-11-17 PROBLEM — R00.0 SINUS TACHYCARDIA: Status: RESOLVED | Noted: 2017-01-11 | Resolved: 2020-11-17

## 2020-11-17 PROCEDURE — 99213 OFFICE O/P EST LOW 20 MIN: CPT | Performed by: INTERNAL MEDICINE

## 2020-11-17 RX ORDER — GABAPENTIN 100 MG/1
300 CAPSULE ORAL NIGHTLY
Qty: 90 CAPSULE | Refills: 0 | Status: SHIPPED | OUTPATIENT
Start: 2020-11-17 | End: 2020-12-10 | Stop reason: SDUPTHER

## 2020-11-17 RX ORDER — CONJUGATED ESTROGENS 0.62 MG/1
0.62 TABLET, FILM COATED ORAL DAILY
COMMUNITY
Start: 2020-11-02 | End: 2021-05-15

## 2020-11-17 ASSESSMENT — ENCOUNTER SYMPTOMS
SHORTNESS OF BREATH: 0
SORE THROAT: 0
CONSTIPATION: 0
DIARRHEA: 0
STRIDOR: 0
COUGH: 0
ABDOMINAL PAIN: 0
NAUSEA: 0
EYE PAIN: 0

## 2020-11-17 NOTE — PROGRESS NOTES
2020    TELEHEALTH EVALUATION -- Audio/Visual (During BVWQJ-67 public health emergency)    HPI:    Roque Veras (:  1990) has requested an audio/video evaluation for the following concern(s): foot pain     31 y.o. female with hx of fibromyalgia, pedal edema, chronic back pain     Since last time, had RAMSEY with BSO and did well , currently on estrogen supplements and doing well    Able to lose weight with diet control and lost more than 10 lbs    Continues to report multiple somatic issues - low back pain , feet pain, which makes her not to sleep at night  Tried multiple meds with no benefit     Reports intermittent swelling in feet by the end of the day with soreness and become tender - improved with taking diuretic recently     Reports anxiety - lexapro did not help. Later started on elavil with too much drowsiness     Lives with 3 children and     Review of Systems   Constitutional: Negative for diaphoresis and fever. HENT: Negative for congestion, hearing loss, nosebleeds, sore throat and tinnitus. Eyes: Negative for pain. Respiratory: Negative for cough, shortness of breath and stridor. Cardiovascular: Negative for chest pain and palpitations. Gastrointestinal: Negative for abdominal pain, constipation, diarrhea and nausea. Genitourinary: Negative for dysuria and frequency. Musculoskeletal: Positive for arthralgias. Skin: Negative for rash. Neurological: Negative for headaches. Psychiatric/Behavioral: Negative for decreased concentration. The patient is not nervous/anxious. All other systems reviewed and are negative. Prior to Visit Medications    Medication Sig Taking? Authorizing Provider   gabapentin (NEURONTIN) 100 MG capsule Take 3 capsules by mouth nightly for 30 days.  Intended supply: 90 days Yes Saw Moran MD   PREMARIN 0.625 MG tablet Take 0.625 mg by mouth daily  Historical Provider, MD   nystatin (MYCOSTATIN) 219816 UNIT/GM cream Apply topically 2 times daily. WALTER Barajas   hydrOXYzine (VISTARIL) 25 MG capsule Take 1 capsule by mouth 2 times daily as needed for Itching or Anxiety  WALTER Barajas   celecoxib (CELEBREX) 100 MG capsule Take 1 capsule by mouth 2 times daily  Dandy Mary MD   amitriptyline (ELAVIL) 10 MG tablet Take 1/2 tablet by mouth for the first 3 nights, then take 1 tablet by mouth nightly. Dandy Mary MD   triamterene-hydrochlorothiazide [facility]) 37.5-25 MG per tablet Take 1 tablet by mouth daily  Dandy Mary MD       Social History     Tobacco Use    Smoking status: Never Smoker    Smokeless tobacco: Never Used   Substance Use Topics    Alcohol use: No    Drug use: No        Allergies   Allergen Reactions    Demerol Other (See Comments)     hallucinations    Meperidine Other (See Comments)    Percocet [Oxycodone-Acetaminophen] Other (See Comments)     hallucinations    Procardia [Nifedipine] Rash   ,   Past Medical History:   Diagnosis Date    Anemia     Currently on Iron PO    Anxiety     Heart abnormalities     \"heart flutters real fast\"    Miscarriage 2009    Ovarian cyst     Tricuspid regurgitation        PHYSICAL EXAMINATION:  [ INSTRUCTIONS:  \"[x]\" Indicates a positive item  \"[]\" Indicates a negative item  -- DELETE ALL ITEMS NOT EXAMINED]  Vital Signs: (As obtained by patient/caregiver or practitioner observation)    Blood pressure-  Heart rate-    Respiratory rate-    Temperature-  Pulse oximetry-     Constitutional: [x] Appears well-developed and well-nourished [x] No apparent distress      [] Abnormal-   Mental status  [x] Alert and awake  [x] Oriented to person/place/time [x]Able to follow commands      Eyes:  EOM    [x]  Normal  [] Abnormal-  Sclera  [x]  Normal  [] Abnormal -         Discharge []  None visible  [] Abnormal -    HENT:   [x] Normocephalic, atraumatic.   [] Abnormal   [x] Mouth/Throat: Mucous membranes are moist.     External Ears [x] Normal  [] Abnormal-     Neck: [x] No visualized mass     Pulmonary/Chest: [x] Respiratory effort normal.  [] No visualized signs of difficulty breathing or respiratory distress        [x] Abnormal-      Musculoskeletal:   [] Normal gait with no signs of ataxia         [x] Normal range of motion of neck        [] Abnormal-       Neurological:        [x] No Facial Asymmetry (Cranial nerve 7 motor function) (limited exam to video visit)          [x] No gaze palsy        [] Abnormal-         Skin:        [] No significant exanthematous lesions or discoloration noted on facial skin         [] Abnormal-            Psychiatric:       [x] Normal Affect [x] No Hallucinations        [] Abnormal-           Other pertinent observable physical exam findings-     ASSESSMENT/PLAN:     Diagnosis Orders   1. Fibromyalgia     2. Pedal edema       Elevated BP - noted on previous visit- placed on maxzide. Need to recheck     Fibromyalgia - multiple somatic complaints - previous investigations have been neg - mulitple meds did not help  - trial of celebrex recently with some relief  - add gabapentin at night. 100 mg to start with and increase as tolerated to 300 mg slowly       Insomnia - elavil caused too much sleep    Need in office visit for foot exam      Return in about 6 months (around 5/17/2021). Sheree Rucker is a 31 y.o. female being evaluated by a Virtual Visit (video visit) encounter to address concerns as mentioned above. A caregiver was present when appropriate. Due to this being a TeleHealth encounter (During GRVGO-06 public health emergency), evaluation of the following organ systems was limited: Vitals/Constitutional/EENT/Resp/CV/GI//MS/Neuro/Skin/Heme-Lymph-Imm.   Pursuant to the emergency declaration under the [facility]5 waiver authority and the [employer] Act, this Virtual Visit was conducted with patient's (and/or

## 2020-12-11 RX ORDER — GABAPENTIN 100 MG/1
300 CAPSULE ORAL NIGHTLY
Qty: 90 CAPSULE | Refills: 0 | Status: SHIPPED | OUTPATIENT
Start: 2020-12-17 | End: 2021-01-08 | Stop reason: SDUPTHER

## 2021-01-08 RX ORDER — GABAPENTIN 100 MG/1
300 CAPSULE ORAL NIGHTLY
Qty: 90 CAPSULE | Refills: 0 | Status: SHIPPED | OUTPATIENT
Start: 2021-01-15 | End: 2021-01-29

## 2021-01-28 ENCOUNTER — OFFICE VISIT (OUTPATIENT)
Dept: INTERNAL MEDICINE CLINIC | Age: 31
End: 2021-01-28

## 2021-01-28 VITALS
DIASTOLIC BLOOD PRESSURE: 85 MMHG | BODY MASS INDEX: 36.8 KG/M2 | SYSTOLIC BLOOD PRESSURE: 125 MMHG | WEIGHT: 200 LBS | HEIGHT: 62 IN | TEMPERATURE: 98.4 F | HEART RATE: 70 BPM | RESPIRATION RATE: 18 BRPM

## 2021-01-28 DIAGNOSIS — M79.7 FIBROMYALGIA: Primary | ICD-10-CM

## 2021-01-28 PROCEDURE — 99212 OFFICE O/P EST SF 10 MIN: CPT | Performed by: INTERNAL MEDICINE

## 2021-01-28 RX ORDER — NABUMETONE 500 MG/1
500 TABLET, FILM COATED ORAL 2 TIMES DAILY
Qty: 60 TABLET | Refills: 3 | Status: SHIPPED | OUTPATIENT
Start: 2021-01-28 | End: 2021-05-15

## 2021-01-28 ASSESSMENT — PATIENT HEALTH QUESTIONNAIRE - PHQ9: SUM OF ALL RESPONSES TO PHQ QUESTIONS 1-9: 1

## 2021-01-28 NOTE — PROGRESS NOTES
Leta Leggett (:  1990) is a 31 y.o. female,Established patient, here for evaluation of the following chief complaint(s):  Follow-up        SUBJECTIVE/OBJECTIVE:    HPI    31 y.o. female with hx of fibromyalgia here for regular f.w    Continues to report multiple somatic issues - low back pain ,bilateral feet pain which are progressive   Today reports having pain in bottom of foot, unable to walk much, using some inserts which helps    Had multiple testing for lumbar spine and hip with no etiology found  Reports no nsaids have worked - tried [employer], diclofenac, celebrex, ibuprofen with no benefit       Recently we have added gabapentin for fibromyalgia symptoms and pt reports no improvement. It has made her drowsy initially but not anymore    Reports anxiety - lexapro did not help. Later started on elavil with too much drowsiness         Working out to lose weight- going to gym    Lives with 3 children and     Allergies   Allergen Reactions    Demerol Other (See Comments)     hallucinations    Meperidine Other (See Comments)    Percocet [Oxycodone-Acetaminophen] Other (See Comments)     hallucinations    Procardia [Nifedipine] Rash     Current Outpatient Medications   Medication Sig Dispense Refill    gabapentin (NEURONTIN) 100 MG capsule Take 3 capsules by mouth nightly for 30 days. 90 capsule 0    PREMARIN 0.625 MG tablet Take 0.625 mg by mouth daily      triamterene-hydrochlorothiazide (MAXZIDE-25) 37.5-25 MG per tablet Take 1 tablet by mouth daily 90 tablet 1     No current facility-administered medications for this visit.           Review of Systems     Constitutional: Negative for fever or chills  HENT: Negative for sore throat   Eyes: Negative for redness   Respiratory: Negative  for dyspnea, cough   Cardiovascular: Negative for chest pain   Gastrointestinal:neg for abd pain, nausea or vomiting or diarrhea  No melena or BRPR  Genitourinary: Negative for hematuria   Musculoskeletal: +ve

## 2021-01-29 ENCOUNTER — TELEPHONE (OUTPATIENT)
Dept: INTERNAL MEDICINE CLINIC | Age: 31
End: 2021-01-29

## 2021-01-29 RX ORDER — GABAPENTIN 100 MG/1
CAPSULE ORAL
Qty: 150 CAPSULE | Refills: 0 | Status: SHIPPED | OUTPATIENT
Start: 2021-01-29 | End: 2021-02-25

## 2021-01-29 NOTE — TELEPHONE ENCOUNTER
----- Message from Mia Storey MD sent at 1/29/2021  8:32 AM EST -----  Contact: [phone]  I have increased gabapentin to 100mg in am, 100 mg pm and 300 mg nightly  Added relafen 500 mg bid  ----- Message -----  From: Marquis Street  Sent: 1/28/2021   3:46 PM EST  To: Mia Storey MD    Pt had an appointment with you today and was confused about the medication changes you made and wanted to see what medications you have her on.

## 2021-02-25 ENCOUNTER — TELEPHONE (OUTPATIENT)
Dept: INTERNAL MEDICINE CLINIC | Age: 31
End: 2021-02-25

## 2021-02-25 RX ORDER — GABAPENTIN 100 MG/1
CAPSULE ORAL
Qty: 150 CAPSULE | Refills: 0 | OUTPATIENT
Start: 2021-03-03 | End: 2021-03-26

## 2021-02-25 RX ORDER — GABAPENTIN 100 MG/1
CAPSULE ORAL
Qty: 150 CAPSULE | Refills: 0 | Status: SHIPPED | OUTPATIENT
Start: 2021-03-02 | End: 2021-03-30 | Stop reason: SDUPTHER

## 2021-02-25 NOTE — TELEPHONE ENCOUNTER
----- Message from Donato Smiley MD sent at 2/25/2021  6:54 AM EST -----  Contact: [phone] ([phone]  There is no higher dose for relafen  Continue gabapentin  ----- Message -----  From: Konstantin Taveras  Sent: 2/24/2021   2:37 PM EST  To: Donato Smiley MD    Pt needs a refill   gabapentin (NEURONTIN) 100 MG capsule    Also nabumetone (RELAFEN) 500 MG tablet has not seem to be working and wasn't sure if you need to up the dose    [address] [place] [address] 26, [id number] [facility] -  [phone]

## 2021-03-02 ENCOUNTER — HOSPITAL ENCOUNTER (EMERGENCY)
Age: 31
Discharge: HOME | End: 2021-03-02
Attending: EMERGENCY MEDICINE
Payer: COMMERCIAL

## 2021-03-02 ENCOUNTER — APPOINTMENT (OUTPATIENT)
Dept: GENERAL RADIOLOGY | Age: 31
End: 2021-03-02
Payer: COMMERCIAL

## 2021-03-02 VITALS
HEIGHT: 62 IN | TEMPERATURE: 97.9 F | RESPIRATION RATE: 15 BRPM | DIASTOLIC BLOOD PRESSURE: 74 MMHG | BODY MASS INDEX: 38.64 KG/M2 | WEIGHT: 210 LBS | OXYGEN SATURATION: 98 % | HEART RATE: 79 BPM | SYSTOLIC BLOOD PRESSURE: 108 MMHG

## 2021-03-02 DIAGNOSIS — R51.9 NONINTRACTABLE HEADACHE, UNSPECIFIED CHRONICITY PATTERN, UNSPECIFIED HEADACHE TYPE: ICD-10-CM

## 2021-03-02 DIAGNOSIS — R53.1 GENERALIZED WEAKNESS: Primary | ICD-10-CM

## 2021-03-02 LAB
ANION GAP SERPL CALCULATED.3IONS-SCNC: 9 MMOL/L (ref 3–16)
BASOPHILS ABSOLUTE: 0.1 K/UL (ref 0–0.2)
BASOPHILS RELATIVE PERCENT: 0.7 %
BILIRUBIN URINE: NEGATIVE
BLOOD, URINE: NEGATIVE
BUN BLDV-MCNC: 15 MG/DL (ref 7–20)
CALCIUM SERPL-MCNC: 9.8 MG/DL (ref 8.3–10.6)
CHLORIDE BLD-SCNC: 105 MMOL/L (ref 99–110)
CLARITY: CLEAR
CO2: 27 MMOL/L (ref 21–32)
COLOR: YELLOW
CREAT SERPL-MCNC: 0.7 MG/DL (ref 0.6–1.1)
EKG ATRIAL RATE: 88 BPM
EKG DIAGNOSIS: NORMAL
EKG P AXIS: 51 DEGREES
EKG P-R INTERVAL: 170 MS
EKG Q-T INTERVAL: 366 MS
EKG QRS DURATION: 84 MS
EKG QTC CALCULATION (BAZETT): 442 MS
EKG R AXIS: 14 DEGREES
EKG T AXIS: 41 DEGREES
EKG VENTRICULAR RATE: 88 BPM
EOSINOPHILS ABSOLUTE: 0.1 K/UL (ref 0–0.6)
EOSINOPHILS RELATIVE PERCENT: 1.1 %
GFR AFRICAN AMERICAN: >60
GFR NON-AFRICAN AMERICAN: >60
GLUCOSE BLD-MCNC: 103 MG/DL (ref 70–99)
GLUCOSE URINE: NEGATIVE MG/DL
HCT VFR BLD CALC: 39 % (ref 36–48)
HEMOGLOBIN: 13.1 G/DL (ref 12–16)
KETONES, URINE: NEGATIVE MG/DL
LEUKOCYTE ESTERASE, URINE: NEGATIVE
LYMPHOCYTES ABSOLUTE: 2.2 K/UL (ref 1–5.1)
LYMPHOCYTES RELATIVE PERCENT: 28.3 %
MCH RBC QN AUTO: 31.2 PG (ref 26–34)
MCHC RBC AUTO-ENTMCNC: 33.5 G/DL (ref 31–36)
MCV RBC AUTO: 93.1 FL (ref 80–100)
MICROSCOPIC EXAMINATION: NORMAL
MONOCYTES ABSOLUTE: 0.6 K/UL (ref 0–1.3)
MONOCYTES RELATIVE PERCENT: 7.3 %
NEUTROPHILS ABSOLUTE: 4.8 K/UL (ref 1.7–7.7)
NEUTROPHILS RELATIVE PERCENT: 62.6 %
NITRITE, URINE: NEGATIVE
PDW BLD-RTO: 12.8 % (ref 12.4–15.4)
PH UA: 6 (ref 5–8)
PLATELET # BLD: 274 K/UL (ref 135–450)
PMV BLD AUTO: 8.7 FL (ref 5–10.5)
POTASSIUM REFLEX MAGNESIUM: 3.8 MMOL/L (ref 3.5–5.1)
PROTEIN UA: NEGATIVE MG/DL
RBC # BLD: 4.19 M/UL (ref 4–5.2)
SODIUM BLD-SCNC: 141 MMOL/L (ref 136–145)
SPECIFIC GRAVITY UA: 1.02 (ref 1–1.03)
TROPONIN: <0.01 NG/ML
URINE TYPE: NORMAL
UROBILINOGEN, URINE: 0.2 E.U./DL
WBC # BLD: 7.6 K/UL (ref 4–11)

## 2021-03-02 PROCEDURE — 2580000003 HC RX 258: Performed by: STUDENT IN AN ORGANIZED HEALTH CARE EDUCATION/TRAINING PROGRAM

## 2021-03-02 PROCEDURE — 71045 X-RAY EXAM CHEST 1 VIEW: CPT

## 2021-03-02 PROCEDURE — 96374 THER/PROPH/DIAG INJ IV PUSH: CPT

## 2021-03-02 PROCEDURE — U0003 INFECTIOUS AGENT DETECTION BY NUCLEIC ACID (DNA OR RNA); SEVERE ACUTE RESPIRATORY SYNDROME CORONAVIRUS 2 (SARS-COV-2) (CORONAVIRUS DISEASE [COVID-19]), AMPLIFIED PROBE TECHNIQUE, MAKING USE OF HIGH THROUGHPUT TECHNOLOGIES AS DESCRIBED BY CMS-2020-01-R: HCPCS

## 2021-03-02 PROCEDURE — 84484 ASSAY OF TROPONIN QUANT: CPT

## 2021-03-02 PROCEDURE — 93005 ELECTROCARDIOGRAM TRACING: CPT | Performed by: EMERGENCY MEDICINE

## 2021-03-02 PROCEDURE — 99284 EMERGENCY DEPT VISIT MOD MDM: CPT

## 2021-03-02 PROCEDURE — 85025 COMPLETE CBC W/AUTO DIFF WBC: CPT

## 2021-03-02 PROCEDURE — 80048 BASIC METABOLIC PNL TOTAL CA: CPT

## 2021-03-02 PROCEDURE — 6370000000 HC RX 637 (ALT 250 FOR IP): Performed by: STUDENT IN AN ORGANIZED HEALTH CARE EDUCATION/TRAINING PROGRAM

## 2021-03-02 PROCEDURE — 81003 URINALYSIS AUTO W/O SCOPE: CPT

## 2021-03-02 PROCEDURE — 6360000002 HC RX W HCPCS: Performed by: STUDENT IN AN ORGANIZED HEALTH CARE EDUCATION/TRAINING PROGRAM

## 2021-03-02 RX ORDER — PROCHLORPERAZINE EDISYLATE 5 MG/ML
10 INJECTION INTRAMUSCULAR; INTRAVENOUS ONCE
Status: COMPLETED | OUTPATIENT
Start: 2021-03-02 | End: 2021-03-02

## 2021-03-02 RX ORDER — 0.9 % SODIUM CHLORIDE 0.9 %
1000 INTRAVENOUS SOLUTION INTRAVENOUS ONCE
Status: COMPLETED | OUTPATIENT
Start: 2021-03-02 | End: 2021-03-02

## 2021-03-02 RX ORDER — AMOXICILLIN 500 MG/1
CAPSULE ORAL
COMMUNITY
Start: 2021-02-01 | End: 2021-05-15

## 2021-03-02 RX ORDER — ACETAMINOPHEN 325 MG/1
650 TABLET ORAL ONCE
Status: COMPLETED | OUTPATIENT
Start: 2021-03-02 | End: 2021-03-02

## 2021-03-02 RX ADMIN — SODIUM CHLORIDE 1000 ML: 9 INJECTION, SOLUTION INTRAVENOUS at 17:43

## 2021-03-02 RX ADMIN — PROCHLORPERAZINE EDISYLATE 10 MG: 5 INJECTION INTRAMUSCULAR; INTRAVENOUS at 17:43

## 2021-03-02 RX ADMIN — ACETAMINOPHEN 650 MG: 325 TABLET ORAL at 17:43

## 2021-03-02 ASSESSMENT — ENCOUNTER SYMPTOMS
ABDOMINAL PAIN: 0
PHOTOPHOBIA: 0
EYE REDNESS: 0
SHORTNESS OF BREATH: 0
BACK PAIN: 0
EYE PAIN: 0
CHEST TIGHTNESS: 0
FACIAL SWELLING: 0
ABDOMINAL DISTENTION: 0
COUGH: 0

## 2021-03-02 ASSESSMENT — PAIN SCALES - GENERAL: PAINLEVEL_OUTOF10: 5

## 2021-03-02 NOTE — ED PROVIDER NOTES
ED Attending Attestation Note     Date of evaluation: 3/2/2021    This patient was seen by the resident. I have seen and examined the patient, agree with the workup, evaluation, management and diagnosis. The care plan has been discussed. I have reviewed the ECG and concur with the resident's interpretation. My assessment reveals a 69-year-old female who presents with chief complaint of fatigue. Patient states she felt tired at work and took a nap and was sent here because she felt like her heart rhythm was \"funny\".   Patient in sinus rhythm right now, awake with normal neurologic exam..       Edda Hobbs MD  03/02/21 [id number]

## 2021-03-02 NOTE — ED PROVIDER NOTES
[address] [facility] RESIDENT NOTE       Date of evaluation: 3/2/2021    Chief Complaint     Fatigue (Onset of feeling tired while at work today. Progressive worsening. )      of Present Illness     Dre Haas is a 31 y.o. female with a PMH of previous syncope/tachycardia (f/u cardiology, has loop recorder), chronic headache, fibromyalgia, adenomyosis status post hysterectomy and salpingo-oophorectomy who presents concerns of fatigue. According to the patient, she developed sudden onset fatigue while at work earlier today. She felt tired and went to eat food with minimal improvement to her symptoms. He also explains having a bout of tachycardia, which self-resolved. She denies any associated CP or SOB. Proceeded to lay down and slept at work. She denies any actual syncopal episodes. Presents to the ED today to be evaluated. En route to the ED she did developed a bifrontal tension-like headache. She describes the headache as gradually worsening since initial onset and similar to her previous chronic headaches. Denies any associated visual disturbances or motor/sensory dysfunction. Patient was diagnosed with a tooth infection several weeks ago and was prescribed a course of antibiotics-been taking these medications on and off for the past several days. Patient denies any significant oral pain preventing her from eating. She denies any neck swelling, drooling, or difficulty with speech. Review of Systems     Review of Systems   Constitutional: Negative for activity change, chills and fever. HENT: Negative for drooling and facial swelling. Eyes: Negative for photophobia, pain and redness. Respiratory: Negative for cough, chest tightness and shortness of breath. Cardiovascular: Negative for chest pain and palpitations. Gastrointestinal: Negative for abdominal distention and abdominal pain.    Endocrine: Negative for polydipsia, polyphagia and polyuria. Genitourinary: Negative for vaginal bleeding and vaginal discharge. Musculoskeletal: Negative for back pain, gait problem and joint swelling. Allergic/Immunologic: Negative for immunocompromised state. Neurological: Negative for dizziness, seizures, light-headedness and numbness. Hematological: Negative for adenopathy. Past Medical, Surgical, Family, and Social History     She has a past medical history of Anemia, Anxiety, Heart abnormalities, Miscarriage, Ovarian cyst, and Tricuspid regurgitation. She has a past surgical history that includes Appendectomy (2006); Ovarian cyst surgery; Endometrial ablation (02/01/2017); Insertable Cardiac Monitor (05/24/2017); and Hysterectomy. Her family history includes Arthritis in her mother; Cancer in her maternal grandmother and paternal grandmother; Mental Retardation in her maternal uncle. She reports that she has never smoked. She has never used smokeless tobacco. She reports that she does not drink alcohol or use drugs. Medications     Previous Medications    AMOXICILLIN (AMOXIL) 500 MG CAPSULE        GABAPENTIN (NEURONTIN) 100 MG CAPSULE    TAKE FIVE CAPSULES BY MOUTH DAILY    NABUMETONE (RELAFEN) 500 MG TABLET    Take 1 tablet by mouth 2 times daily    PREMARIN 0.625 MG TABLET    Take 0.625 mg by mouth daily       Allergies     She is allergic to demerol; meperidine; percocet [oxycodone-acetaminophen]; and procardia [nifedipine]. Physical Exam     INITIAL VITALS: BP: 121/80, Temp: 97.9 °F (36.6 °C), Pulse: 82, Resp: 16, SpO2: 100 %   Physical Exam  HENT:      Mouth/Throat:     Cardiovascular:      Rate and Rhythm: Normal rate. Pulses: Normal pulses. Heart sounds: No murmur. Pulmonary:      Effort: Pulmonary effort is normal. No respiratory distress. Abdominal:      General: There is no distension. Palpations: Abdomen is soft. Tenderness: There is no abdominal tenderness. Skin:     General: Skin is warm. Capillary Refill: Capillary refill takes less than 2 seconds. Neurological:      Mental Status: She is alert and oriented to person, place, and time. Mental status is at baseline. Cranial Nerves: No cranial nerve deficit. Sensory: No sensory deficit. Motor: No weakness. DiagnosticResults     EKG   Interpreted in conjunction with emergencydepartment physician No att. providers found  Rhythm: normal sinus   Rate: normal  Axis: left  Ectopy: none  Conduction: normal  ST Segments: normal  T Waves:no acute change  Q Waves: nonspecific  Clinical Impression: non-specific EKG    RADIOLOGY:  XR CHEST PORTABLE   Final Result   Impression:   1. No acute cardiopulmonary disease.           LABS:   Results for orders placed or performed during the hospital encounter of 03/02/21   CBC Auto Differential   Result Value Ref Range    WBC 7.6 4.0 - 11.0 K/uL    RBC 4.19 4.00 - 5.20 M/uL    Hemoglobin 13.1 12.0 - 16.0 g/dL    Hematocrit 39.0 36.0 - 48.0 %    MCV 93.1 80.0 - 100.0 fL    MCH 31.2 26.0 - 34.0 pg    MCHC 33.5 31.0 - 36.0 g/dL    RDW 12.8 12.4 - 15.4 %    Platelets 640 789 - 933 K/uL    MPV 8.7 5.0 - 10.5 fL    Neutrophils % 62.6 %    Lymphocytes % 28.3 %    Monocytes % 7.3 %    Eosinophils % 1.1 %    Basophils % 0.7 %    Neutrophils Absolute 4.8 1.7 - 7.7 K/uL    Lymphocytes Absolute 2.2 1.0 - 5.1 K/uL    Monocytes Absolute 0.6 0.0 - 1.3 K/uL    Eosinophils Absolute 0.1 0.0 - 0.6 K/uL    Basophils Absolute 0.1 0.0 - 0.2 K/uL   Basic Metabolic Panel w/ Reflex to MG   Result Value Ref Range    Sodium 141 136 - 145 mmol/L    Potassium reflex Magnesium 3.8 3.5 - 5.1 mmol/L    Chloride 105 99 - 110 mmol/L    CO2 27 21 - 32 mmol/L    Anion Gap 9 3 - 16    Glucose 103 (H) 70 - 99 mg/dL    BUN 15 7 - 20 mg/dL    CREATININE 0.7 0.6 - 1.1 mg/dL    GFR Non-African American >60 >60    GFR African American >60 >60    Calcium 9.8 8.3 - 10.6 mg/dL   Urinalysis, reflex to microscopic   Result Value Ref Range Color, UA Yellow Straw/Yellow    Clarity, UA Clear Clear    Glucose, Ur Negative Negative mg/dL    Bilirubin Urine Negative Negative    Ketones, Urine Negative Negative mg/dL    Specific Gravity, UA 1.025 1.005 - 1.030    Blood, Urine Negative Negative    pH, UA 6.0 5.0 - 8.0    Protein, UA Negative Negative mg/dL    Urobilinogen, Urine 0.2 <2.0 E.U./dL    Nitrite, Urine Negative Negative    Leukocyte Esterase, Urine Negative Negative    Microscopic Examination Not Indicated     Urine Type Voided    Troponin   Result Value Ref Range    Troponin <0.01 <0.01 ng/mL   EKG 12 Lead   Result Value Ref Range    Ventricular Rate 88 BPM    Atrial Rate 88 BPM    P-R Interval 170 ms    QRS Duration 84 ms    Q-T Interval 366 ms    QTc Calculation (Bazett) 442 ms    P Axis 51 degrees    R Axis 14 degrees    T Axis 41 degrees    Diagnosis       Normal sinus rhythmNormal ECGConfirmed by MD, ER ([id number]),  Kalina Coyle ([id number]) on 3/2/2021 6:12:41 PM       ED BEDSIDE ULTRASOUND:  None     RECENT VITALS:  BP: 108/74, Temp: 97.9 °F (36.6 °C), Pulse: 79(Simultaneous filing. User may not have seen previous data. ),Resp: 15(Simultaneous filing. User may not have seen previous data.), SpO2: 98 %(Simultaneous filing. User may not have seen previous data.)     Procedures     None     ED Course     Nursing Notes, Past Medical Hx, Past Surgical Hx, Social Hx, Allergies, and Family Hx were reviewed. The patient was given the followingmedications:  Orders Placed This Encounter   Medications    prochlorperazine (COMPAZINE) injection 10 mg    0.9 % sodium chloride bolus    acetaminophen (TYLENOL) tablet 650 mg       CONSULTS:  None    MEDICAL DECISION MAKING / ASSESSMENT / Sandra Blakely is a 31 y.o. female with a PMH of PMH of chronic headaches, fibromyalgia, and adenomyosis status post hysterectomy and salpingo-oophorectomy who presents concerns of fatigue.     Given that patient presents with generalized fatigue and headache, her differential diagnosis is broad. Notably her headache is bilateral and pressure-like in nature, making a tension headache most likely. Nonetheless, intracranial causes of a headache were also considered including an intracranial bleed. This was deemed less likely, given that her headache has gradually worsened since onset and is not maximal at initial onset. Her neurological exam was also nonfocal and she was found to be alert and oriented x3 upon presentation to the ED. Infectious etiology to her headache was also briefly considered and deemed less likely given that she is afebrile, denies any photophobia or neck rigidity. Given her history of previous syncopal episodes in the setting of tachycardia, a cardiac etiology to her symptoms was also considered. This was deemed less likely given that patient denies any chest pain or shortness of breath on presentation. Furthermore, she is found to be normotensive, not tachycardic, and had a regular rhythm on the cardiac monitor. Concerning her     CBC did not reveal leukocytosis. Hgb/hct were normal was well. BMP did not reveal any electrolyte anomalies. UA did not reveal any blood or signs of infection. COVID swab was obtained. Troponin was undetectable and EKG did not show any acute signs of ischemia. Patient was given a dose of acetaminophen as well as compazine for her headache. She was also given 1L of IVF's for hydration. Shortly after, she reported improvement to her symptoms. Her cardiologist, Dr. James Mukherjee, was contacted- who no longer works at [address] [place] [address], yet spoke to the covering cardiologist who recommended for the patient to see Dr. Posey Cooks to loop recorder interogation. Patient was deemed eligible for discharge home with close PCP follow-up. In the meantime, the importance of adequate fluid hydration was stressed. Strict return precautions were dicussed with the patient and her  at bedside.      This patient was also evaluated by the attending physician. All care plans werediscussed and agreed upon. Clinical Impression     1. Generalized weakness    2. Nonintractable headache, unspecified chronicity pattern, unspecified headache type        Disposition     PATIENT REFERRED TO:  Judson Carney MD  [id number] [facility]   [facility]  [phone]    Call   call for a follow-up appointment within the week      DISCHARGE MEDICATIONS:  New Prescriptions    No medications on file       DISPOSITION    DISCHARGE  At this time, the patient was deemed appropriate for discharge. Discharge instructions, including strict return precautions for new or worsening symptoms concerning to the patient, were provided. All of her questions were answered satisfactorily, and she was subsequently sent home in stable condition. The patient is instructed to return to the emergency department should her symptoms worsen or any concern she believes warrants acute physician evaluation.        Jose Ulrich MD  03/02/21 [id number]

## 2021-03-03 ENCOUNTER — CARE COORDINATION (OUTPATIENT)
Dept: CARE COORDINATION | Age: 31
End: 2021-03-03

## 2021-03-03 LAB — SARS-COV-2: NOT DETECTED

## 2021-03-03 NOTE — CARE COORDINATION
Patient contacted regarding recent discharge and COVID-19 risk. Discussed COVID-19 related testing which was available at this time. Test results were negative. Patient informed of results, if available? Yes     Care Transition Nurse/ Ambulatory Care Manager contacted the patient by telephone to perform post discharge assessment. Verified name and  with patient as identifiers. Patient has following risk factors of: anemia, anxiety, ovarian cyst, loop recorder, tricuspid regurgitation. CTN/ACM reviewed discharge instructions, medical action plan and red flags related to discharge diagnosis. Reviewed and educated them on any new and changed medications related to discharge diagnosis. Advised obtaining a 90-day supply of all daily and as-needed medications. Education provided regarding infection prevention, and signs and symptoms of COVID-19 and when to seek medical attention with patient who verbalized understanding. Discussed exposure protocols and quarantine from [address] you at higher risk for severe illness  and given an opportunity for questions and concerns. The patient agrees to contact the COVID-19 hotline [phone] or PCP office for questions related to their healthcare. CTN/ACM provided contact information for future reference. From CDC: Are you at higher risk for severe illness?  Wash your hands often.  Avoid close contact (6 feet, which is about two arm lengths) with people who are sick.  Put distance between yourself and other people if COVID-19 is spreading in your community.  Clean and disinfect frequently touched surfaces.  Avoid all cruise travel and non-essential air travel.  Call your healthcare professional if you have concerns about COVID-19 and your underlying condition or if you are sick.     For more information on steps you can take to protect yourself, see CDC's How to [place] for follow-up call in 7-14 days based on severity of symptoms and risk factors. Patient states she feels much better this am.  She was able to sleep last pm.  States her ha is gone but weakness remains. Patient is going to make an appointment with new cardiologist.  Kishore Rodriguez and she declined. Plan  ED FU in 2 weeks    Haritha Robert.  Ana Rosa Ferguson RN, BSN, [address] [place] [address] Primary Care  [phone]

## 2021-03-04 ENCOUNTER — TELEPHONE (OUTPATIENT)
Dept: CARDIOLOGY CLINIC | Age: 31
End: 2021-03-04

## 2021-03-05 ENCOUNTER — TELEPHONE (OUTPATIENT)
Dept: INTERNAL MEDICINE CLINIC | Age: 31
End: 2021-03-05

## 2021-03-08 ENCOUNTER — PROCEDURE VISIT (OUTPATIENT)
Dept: CARDIOLOGY CLINIC | Age: 31
End: 2021-03-08
Payer: COMMERCIAL

## 2021-03-08 ENCOUNTER — TELEPHONE (OUTPATIENT)
Dept: CARDIOLOGY CLINIC | Age: 31
End: 2021-03-08

## 2021-03-08 DIAGNOSIS — R55 SYNCOPE, UNSPECIFIED SYNCOPE TYPE: ICD-10-CM

## 2021-03-08 DIAGNOSIS — Z45.09 ENCOUNTER FOR LOOP RECORDER CHECK: ICD-10-CM

## 2021-03-08 PROCEDURE — G2066 INTER DEVC REMOTE 30D: HCPCS | Performed by: INTERNAL MEDICINE

## 2021-03-08 PROCEDURE — 93298 REM INTERROG DEV EVAL SCRMS: CPT | Performed by: INTERNAL MEDICINE

## 2021-03-08 NOTE — TELEPHONE ENCOUNTER
Pt had another episode of passing out last night. Spouse did not take her to hospital because they know this is cardiac related but there is nothing ED can do useless this happens there. Spouse states her employer is considering her a liability at work. They may have to file short term disability. Wanting to know if this becomes the case how can our office help them with this. Pt spouse also concerned about her driving. These episodes come on with no warning. Wants to know if pt can be seen sooner. Please advise.

## 2021-03-08 NOTE — TELEPHONE ENCOUNTER
Spoke with patient's : Her  reports that her monitor is plugged in but they have poor service. Informed them we would likely have to have patient come in for scheduled device checks in office given poor service. V/u. Patient's  reports that patient does not have any prodrome prior to syncopal episodes. Informed patient not to drive until given ok to do so. We would be happy to complete FMLA/STD r/t syncopal episodes. Moved appointment up to 3/16/2021.

## 2021-03-08 NOTE — TELEPHONE ENCOUNTER
Notice of Disconnected Monitor Disconnected Monitor: Clinic Acknowledged - See Patient Details    Last communication occurred on:  19-Sep-2020  (170 days ago)    Pt has a ILR and is noncompliant w/ carelink remote monitoring.

## 2021-03-09 NOTE — PROGRESS NOTES
LAST INTERROGATION 8/2020. (POOR CELL SERVICE). OV AJK 3/16/21. Remote interrogation of implanted cardiac event monitor shows normal function.   Dx syncope, palpitations, hx ST.(toprol xl was discontinued on med rec 12/2019 when pt went to ER). BATTERY REACHED RRT/EOS 1/3/2021. Tachycardia recorded (>1000 events). They appear to be ST w/ rates 150-170 bpm and longest 3.5 min. Symptom 17-Dec-2019 20:01-NSR, no ectopy. Average v rates  bpm, recent uptrend noted. Follow up 1 month via carelink. See PACEART report under Cardiology tab.

## 2021-03-16 ENCOUNTER — TELEPHONE (OUTPATIENT)
Dept: CARDIOLOGY CLINIC | Age: 31
End: 2021-03-16

## 2021-03-16 ENCOUNTER — OFFICE VISIT (OUTPATIENT)
Dept: CARDIOLOGY CLINIC | Age: 31
End: 2021-03-16
Payer: COMMERCIAL

## 2021-03-16 ENCOUNTER — NURSE ONLY (OUTPATIENT)
Dept: CARDIOLOGY CLINIC | Age: 31
End: 2021-03-16

## 2021-03-16 VITALS
BODY MASS INDEX: 38.64 KG/M2 | DIASTOLIC BLOOD PRESSURE: 70 MMHG | OXYGEN SATURATION: 97 % | SYSTOLIC BLOOD PRESSURE: 120 MMHG | HEIGHT: 62 IN | WEIGHT: 210 LBS | HEART RATE: 90 BPM

## 2021-03-16 DIAGNOSIS — R55 SYNCOPE, UNSPECIFIED SYNCOPE TYPE: ICD-10-CM

## 2021-03-16 DIAGNOSIS — R60.9 EDEMA, UNSPECIFIED TYPE: ICD-10-CM

## 2021-03-16 DIAGNOSIS — Z45.09 ENCOUNTER FOR LOOP RECORDER CHECK: ICD-10-CM

## 2021-03-16 DIAGNOSIS — R00.2 PALPITATIONS: Primary | ICD-10-CM

## 2021-03-16 PROCEDURE — 99244 OFF/OP CNSLTJ NEW/EST MOD 40: CPT | Performed by: INTERNAL MEDICINE

## 2021-03-16 NOTE — TELEPHONE ENCOUNTER
MediLynx 7 day monitor ordered for pt  Orderd by: [address]  Date: 3/16/2021  Place: [place] office  Placed by: Jenae Herrera LPN    Pt given verbal instructions on how monitor works, how to report cardiac symptoms on monitor and where to return monitor (ship back to [place]) once prescribed wear time has been completed. Pt verbalized understanding of all instructions given. Successful connection of monitor while in office.

## 2021-03-16 NOTE — PROGRESS NOTES
[address]   Electrophysiology Consult Note              Date: 3/16/21  Patient Name: Moe Kline  YOB: 1990    Primary Care Physician: Fabian Crawford MD    CHIEF COMPLAINT:   Chief Complaint   Patient presents with    New Patient     Prev seen NP BB    Follow-up     Rapid heart rate. Chest Pressure    Other     Device maintance check     HISTORY OF PRESENT ILLNESS: Moe Kline is a 31 y.o. female with a PMH of previous syncope/tachycardia s/p ILR 5/24/2017, chronic migraine headache, fibromyalgia, adenomyosis status post hysterectomy and salpingo-oophorectomy who presented to the ED on 3/2/2021 with concerns of fatigue. She reports she was told all her testing was normal and returned to work. She reports she felt very tired and was experiencing palpitations with associated chest pain. She reports she did not remember anything that happened after that, but she was told she was laying on the floor and had missed 12 work phone calls. Today, 3/16/2021, her ECG demonstrates SR 84 BPM. She reports she does not have much notice prior to her syncopal episodes. She reported that her syncopal episodes are happening more frequently. She does notice she usually has HA prior to episodes and then she will experience tunnel vision prior to losing consciousness. She reports that if she does feel it coming on, she lays down and tried to rest, which typically helps resolve the issue. She reports she has seen a neurologist in the past for syncope but does not recall what what said. She reports that she experiences edema in her extremities and around her eyes. She reports she was unable to metoprolol due to fatigue and bradycardia. She reports she has been taking gabapentin for about 8 months. She reports she stopped taking it a few days ago to see if this would make a difference, and her syncopal episodes have persisted.  She reports she tried to stop her premarin for several days which did not help her syncopal episodes either. Patient denies current SOB. Past Medical History:   has a past medical history of Anemia, Anxiety, Heart abnormalities, Miscarriage, Ovarian cyst, and Tricuspid regurgitation. Past Surgical History:   has a past surgical history that includes Appendectomy (2006); Ovarian cyst surgery; Endometrial ablation (02/01/2017); Insertable Cardiac Monitor (05/24/2017); and Hysterectomy. Allergies:  Demerol, Meperidine, Percocet [oxycodone-acetaminophen], and Procardia [nifedipine]    Social History:   reports that she has never smoked. She has never used smokeless tobacco. She reports that she does not drink alcohol or use drugs. Family History: family history includes Arthritis in her mother; Cancer in her maternal grandmother and paternal grandmother; Mental Retardation in her maternal uncle. Home Medications:    Prior to Admission medications    Medication Sig Start Date End Date Taking? Authorizing Provider   amoxicillin (AMOXIL) 500 MG capsule  2/1/21   Historical Provider, MD   gabapentin (NEURONTIN) 100 MG capsule TAKE FIVE CAPSULES BY MOUTH DAILY 3/2/21 4/1/21  India Reed MD   nabumetone (RELAFEN) 500 MG tablet Take 1 tablet by mouth 2 times daily 1/28/21   India Reed MD   PREMARIN 0.625 MG tablet Take 0.625 mg by mouth daily 11/2/20   Historical Provider, MD       REVIEW OF SYSTEMS:    All 14-point review of systems are completed and  pertinent positives are mentioned in the history of present illness. Other  systems are reviewed and are negative. Physical Examination:    [facility] 08/07/2017 Comment: hyst 08/2017     Constitutional and General Appearance:    alert, cooperative, no distress and appears stated age  [de-identified]:    PERRLA, no cervical lymphadenopathy. No masses palpable.  Normal oral mucosa  Respiratory:  · Normal excursion and expansion without use of accessory muscles  · Resp Auscultation: Normal breath sounds without dullness

## 2021-03-16 NOTE — PATIENT INSTRUCTIONS
RECOMMENDATIONS:  1. Referral placed for neurology. 2. 1 week cardiac event monitor to assess syncope. 3. Limited echo to assess LVEF given edema. 4. Follow up with [address] [place] [address],[address] April 13th at 3:30PM.     Your provider has ordered testing for further evaluation. An order/prescription has been included in your paper work.  To schedule outpatient testing, contact Central Scheduling by calling [address] [place] [address] ([phone]).     Fax # [phone]

## 2021-03-18 ENCOUNTER — CARE COORDINATION (OUTPATIENT)
Dept: CARE COORDINATION | Age: 31
End: 2021-03-18

## 2021-03-18 NOTE — CARE COORDINATION
Your Patient resolved from the Care Transitions episode on 3/3/21    Patient/family has been provided the following resources and education related to COVID-19:                         Signs, symptoms and red flags related to COVID-19            CDC exposure and quarantine guidelines            Conduit exposure contact - [phone]            Contact for their local Department of Health                 Patient currently reports that the following symptoms have improved:  headache, fatigue and no new/worsening symptoms     No further outreach scheduled with this CTN/ACM. Episode of Care resolved. Patient has this CTN/ACM contact information if future needs arise. Patient states she has ongoing physician appointments due to fatigue and headache persisting. Her headache is minimal now and she will have a new patient neurology appointment soon. Jazmín Avila RN, BSN, [address] [place] [address] Care  [phone]

## 2021-03-30 PROCEDURE — 93272 ECG/REVIEW INTERPRET ONLY: CPT | Performed by: INTERNAL MEDICINE

## 2021-03-30 RX ORDER — GABAPENTIN 100 MG/1
CAPSULE ORAL
Qty: 150 CAPSULE | Refills: 0 | Status: SHIPPED | OUTPATIENT
Start: 2021-03-31 | End: 2021-04-29 | Stop reason: SDUPTHER

## 2021-03-31 ENCOUNTER — TELEPHONE (OUTPATIENT)
Dept: CARDIOLOGY CLINIC | Age: 31
End: 2021-03-31

## 2021-03-31 NOTE — TELEPHONE ENCOUNTER
Pt called in regards to:  Pt is on short term disability and said the paperwork for [address] it fill out and sign was sent out to [address] around 3/16th. They have never heard anything back and was checking on the status.   Please return call and advise

## 2021-03-31 NOTE — TELEPHONE ENCOUNTER
Spoke with patient. paperwork is filled out, however [address] [facility],[address] s OOT and will not be able to sign this until he is back. . I faxed all records and noted to [phone], I also LMOVM for someone named Cricket Felix @ [phone] [id number].

## 2021-04-06 DIAGNOSIS — R55 SYNCOPE, UNSPECIFIED SYNCOPE TYPE: ICD-10-CM

## 2021-04-07 ENCOUNTER — TELEPHONE (OUTPATIENT)
Dept: CARDIOLOGY CLINIC | Age: 31
End: 2021-04-07

## 2021-04-07 NOTE — TELEPHONE ENCOUNTER
Spoke to patient, she rescheduled her test for tomorrow. She will keep her appt for next Tuesday with [address] [facility]. Also- she says that we filled out paperwork for her for work, FRANCIS told her to stay off until she sees neurologist, she has appt with them for 04/15/2021. But the paperwork -we didn't put that she has to stay off. They need more information. They told patient that they have sent forms to us. Please advise. And also let patient know.

## 2021-04-08 ENCOUNTER — TELEPHONE (OUTPATIENT)
Dept: CARDIOLOGY CLINIC | Age: 31
End: 2021-04-08

## 2021-04-08 NOTE — TELEPHONE ENCOUNTER
Sent letter to [employer] marli per their request regarding patient to be off work until cleared by cardiology.  Fax: [phone]. [phone]

## 2021-04-08 NOTE — TELEPHONE ENCOUNTER
Paperwork was faxed last week. .. but she knew I was waiting for AGK to return to sign. It is written on the paperwork patient may not drive until after Neuro consult.

## 2021-04-08 NOTE — LETTER
[address] Cardiology - [address]  Phone: [phone]  Fax: [phone]    Cleo Pitt MD     Re: Dyllan Elder 1990 April 8, 2021    To whom it may concern,     Per Dr Roldan Padorn, patient is to remain off work until testing is completed for cardiology due to syncopal episodes. She has a follow up appointment 4/13/2021 with cardiology and will also be seeing neurology for syncope 4/15/2021. We will be able to provide further information after we see the patient in office 4/13/2021 as to an expected back to work date, however, please keep in mind that neurology may have additional recommendations.        Sincerely,        Cleo Pitt MD

## 2021-04-09 ENCOUNTER — HOSPITAL ENCOUNTER (OUTPATIENT)
Dept: NON INVASIVE DIAGNOSTICS | Age: 31
Discharge: HOME | End: 2021-04-09
Payer: COMMERCIAL

## 2021-04-09 DIAGNOSIS — R60.9 EDEMA, UNSPECIFIED TYPE: ICD-10-CM

## 2021-04-09 PROCEDURE — 93306 TTE W/DOPPLER COMPLETE: CPT

## 2021-04-13 ENCOUNTER — OFFICE VISIT (OUTPATIENT)
Dept: CARDIOLOGY CLINIC | Age: 31
End: 2021-04-13
Payer: COMMERCIAL

## 2021-04-13 ENCOUNTER — NURSE ONLY (OUTPATIENT)
Dept: CARDIOLOGY CLINIC | Age: 31
End: 2021-04-13

## 2021-04-13 VITALS
SYSTOLIC BLOOD PRESSURE: 110 MMHG | WEIGHT: 211 LBS | DIASTOLIC BLOOD PRESSURE: 80 MMHG | HEIGHT: 62 IN | OXYGEN SATURATION: 98 % | BODY MASS INDEX: 38.83 KG/M2 | HEART RATE: 84 BPM

## 2021-04-13 DIAGNOSIS — R55 SYNCOPE AND COLLAPSE: ICD-10-CM

## 2021-04-13 DIAGNOSIS — R00.0 TACHYCARDIA: Primary | ICD-10-CM

## 2021-04-13 DIAGNOSIS — R07.2 PRECORDIAL PAIN: ICD-10-CM

## 2021-04-13 PROCEDURE — 93000 ELECTROCARDIOGRAM COMPLETE: CPT | Performed by: INTERNAL MEDICINE

## 2021-04-13 PROCEDURE — 99214 OFFICE O/P EST MOD 30 MIN: CPT | Performed by: INTERNAL MEDICINE

## 2021-04-13 NOTE — PATIENT INSTRUCTIONS
RECOMMENDATIONS:  1. Stress test- GXT Myoview to evaluate chest pain   2. Recommend TILT table testing and also loop recorder placement (have a  take you to the procedure)  3. Will hold of on medication changes until after testing   4. Follow up with me after procedure  Your provider has ordered testing for further evaluation. An order/prescription has been included in your paper work.  To schedule outpatient testing, contact Central Scheduling by calling [address] [place] [address] ([phone]).

## 2021-04-13 NOTE — PROGRESS NOTES
Device clinic unable to interrogate loop recorder d/t depleted battery life. Return kit for bedside monitor was given to the patient to return to [employer]. She v/u.

## 2021-04-13 NOTE — LETTER
for several days which did not help her syncopal episodes either. Patient denies current SOB. Interval history since last office visit:   Since her last visit, she wore a AVTAR from 3/16-3/26/2021 which showed an average heart rate of 86, lowest 58, highest 168 bpm, sinus rhythm with tachycardia, very rare PAC's, and PVC's. at her last visit, she was referred to neurology. Today 4/13/2021, she she states she had bot been feeling well and has had 4-5 episodes of almost passing out. With one spell she was outside with her sister in law and was helping with landscaping. She started to feel strange and her sister in law voice sounded like in was at a distance. She had to lay on the ground in order not to pas out. 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 During this spell she had chest pain that radiated into her arms. She has been staying well hydrated but is also trying to loose weight. She states her weight has gone up 4 lbs (207-211) and has foot swelling. She reports having issues with low heart rates in the past on beta blockers. She will be seeing neurology this month. Past Medical History:   has a past medical history of Anemia, Anxiety, Heart abnormalities, Miscarriage, Ovarian cyst, and Tricuspid regurgitation. Past Surgical History:   has a past surgical history that includes Appendectomy (2006); Ovarian cyst surgery; Endometrial ablation (02/01/2017); Insertable Cardiac Monitor (05/24/2017); and Hysterectomy. Allergies:  Demerol, Meperidine, Percocet [oxycodone-acetaminophen], and Procardia [nifedipine]    Social History:   reports that she has never smoked. She has never used smokeless tobacco. She reports that she does not drink alcohol or use drugs. Family History: family history includes Arthritis in her mother; Cancer in her maternal grandmother and paternal grandmother; Mental Retardation in her maternal uncle.     Home suffer from recurrent symptoms. She previously was on metoprolol however discontinued as her symptoms improved in the past.  I am unclear as to what her symptoms are related to from a rhythm standpoint as her device shows no arrhythmias. I will asked that she wear a 30-day monitor and follow-up with us in clinic. I also asked that she follow-up with neurology as far as her migraines are concerned as this may be the true etiology of her symptoms.  - Thirty day monitor.  - Follow up with neurology. - Limited echocardiogram for edema.  - Follow up with me in a month. 04/13/2021  Patient underwent echocardiogram which showed normal cardiac function. Her monitor showed rare PACs and PVCs that were not symptomatic. No significant arrhythmias noted on her monitor. Patient did have presyncope and syncopal events. None of these occurred while she was wearing her heart monitor. From a symptom standpoint appears to be vagal in nature. I will asked that she repeat a tilt table test.  Given her chest pain I will asked that she have a stress test as well as she is post hysterectomy with bilateral oophorectomies.  -Schedule exercise stress test.  - Schedule tilt table testing.  - Continue current course but consider fludrocortisone or midodrine after tilt table test.    2. Migraines/altered mental state. Etiology unclear. - Plan as per above. 3. Chest pain     4. Peripheral edema     5. Dizziness  4/13/2021  Champ Said is here today for follow up for syncope/presyncope. AVTAR from 3/2021 showed sinus rhythm with tachycardia, very rare PAC's, and PVC's. At her last visit she was referred to neurology. She has had 4-5 episodes of near syncope in the last 2 weeks. Last episode she had chest pain that radiated into her arms. She has a history ovary removal and then her uterus a few years later due to endometriosis. She would like to have her ILR removed.  She is of work until 4/22/2021 due to her symptoms.   - Plan as per above. RECOMMENDATIONS:  1. Stress test- GXT Myoview to evaluate chest pain   2. Recommend TILT table testing and also loop recorder placement (have a  take you to the procedure)  3. Will hold of on medication changes until after testing   4. Follow up with me after procedure       QUALITY MEASURES  1. Tobacco Cessation Counseling: NA  2. Retake of BP if >140/90:   NA  3. Documentation to PCP/referring for new patient:  Sent to PCP at close of office visit  4. CAD patient on anti-platelet: NA  5. CAD patient on STATIN therapy:  NA  6. Patient with CHF and aFib on anticoagulation:  NA     All questions and concerns were addressed to the patient/family. Alternatives to my treatment were discussed. This note has been scribed in the presence of Paola Osorio MD by Domenica Jenkins RN. Dr. Tate White MD  Electrophysiology  [place] [address]. [address] [place] [address]. [address]. [facility] [address]  Phone: [phone]  Fax: [phone]     NOTE: This report was transcribed using voice recognition software. Every effort was made to ensure accuracy, however, inadvertent computerized transcription errors may be present. The scribe's documentation has been prepared under my direction and personally reviewed by me in its entirety. I confirm that the note above accurately reflects all work, physical examination, the discussion of treatments and procedures, and medical decision making performed by me. Josie Richardson RN personally performed the services described in this documentation as scribed by nurse in my presence, and is both accurate and complete. Electronically signed by Domenica Jenkins RN on 4/13/2021 at 4:39 PM     The scribe's documentation has been prepared under my direction and personally reviewed by me in its entirety.  I confirm that the note above accurately reflects all work, physical examination, the discussion of treatments and procedures, and medical decision making performed by me. Elvis Phan MD personally performed the services described in this documentation as scribed by nurse in my presence, and is both accurate and complete.     Electronically signed by Umair Winston MD on 4/15/2021 at 9:58 AM

## 2021-04-13 NOTE — PROGRESS NOTES
[address]   Electrophysiology Consult Note              Date: 4/13/21  Patient Name: Regino Mcclain  YOB: 1990    Primary Care Physician: Favian Horn MD    CHIEF COMPLAINT:   Chief Complaint   Patient presents with    Follow-up    Other     Device maintance check    Chest Pain     Chest pressure, chest heaviness, heartburn    Dizziness     lightheadedness     HISTORY OF PRESENT ILLNESS: Regino Mcclain is a 31 y.o. female with a PMH of previous syncope/tachycardia s/p ILR 5/24/2017, chronic migraine headache, fibromyalgia, adenomyosis status post hysterectomy and salpingo-oophorectomy who presented to the ED on 3/2/2021 with concerns of fatigue. She reports she was told all her testing was normal and returned to work. She reports she felt very tired and was experiencing palpitations with associated chest pain. She reports she did not remember anything that happened after that, but she was told she was laying on the floor and had missed 12 work phone calls. On 3/16/2021, her ECG demonstrates SR 84 BPM. She reports she does not have much notice prior to her syncopal episodes. She reported that her syncopal episodes are happening more frequently. She does notice she usually has HA prior to episodes and then she will experience tunnel vision prior to losing consciousness. She reports that if she does feel it coming on, she lays down and tried to rest, which typically helps resolve the issue. She reports she has seen a neurologist in the past for syncope but does not recall what what said. She reports that she experiences edema in her extremities and around her eyes. She reports she was unable to metoprolol due to fatigue and bradycardia. She reports she has been taking gabapentin for about 8 months. She reports she stopped taking it a few days ago to see if this would make a difference, and her syncopal episodes have persisted.  She reports she tried to stop her premarin for Medications:    Prior to Admission medications    Medication Sig Start Date End Date Taking? Authorizing Provider   gabapentin (NEURONTIN) 100 MG capsule TAKE FIVE CAPSULES BY MOUTH DAILY 3/31/21 4/29/21 Yes Zeeshan Pulido MD   amoxicillin (AMOXIL) 500 MG capsule  2/1/21  Yes Historical Provider, MD   nabumetone (RELAFEN) 500 MG tablet Take 1 tablet by mouth 2 times daily 1/28/21  Yes Zeeshan Pulido MD   PREMARIN 0.625 MG tablet Take 0.625 mg by mouth daily 11/2/20  Yes Historical Provider, MD       REVIEW OF SYSTEMS:    All 14-point review of systems are completed and  pertinent positives are mentioned in the history of present illness. Other  systems are reviewed and are negative. Physical Examination:    /80   Pulse 84   Ht 5' 2\" (1.575 m)   Wt 211 lb (95.7 kg)   LMP 08/07/2017 Comment: hyst 08/2017  SpO2 98%   BMI 38.59 kg/m²      Constitutional and General Appearance:    alert, cooperative, no distress and appears stated age  [de-identified]:    PERRLA, no cervical lymphadenopathy. No masses palpable. Normal oral mucosa  Respiratory:  · Normal excursion and expansion without use of accessory muscles  · Resp Auscultation: Normal breath sounds without dullness or wheezing  Cardiovascular:  · The apical impulse is not displaced  · RRR S1S2 w/o M/G/R  Abdomen:  · No masses or tenderness  · Bowel sounds present  Extremities:  ·  No Cyanosis or Clubbing  ·  Lower extremity edema: No  · Skin: Warm and dry  Neurological:  · Alert and oriented. · Moves all extremities well  · No abnormalities of mood, affect, memory, mentation, or behavior are noted    DATA:    ECG 3/16/2021: SR 84 BPM.     IMPRESSION:    1. Syncope/presyncope.  03/16/2021  Patient is a pleasant 71-year-old female with a medical history significant for fibromyalgia, sinus tachycardia, and syncope/presyncope.   She states that since the last time she saw us in 2018 she has been doing well until over the last 3 months when she began to suffer from recurrent symptoms. She previously was on metoprolol however discontinued as her symptoms improved in the past.  I am unclear as to what her symptoms are related to from a rhythm standpoint as her device shows no arrhythmias. I will asked that she wear a 30-day monitor and follow-up with us in clinic. I also asked that she follow-up with neurology as far as her migraines are concerned as this may be the true etiology of her symptoms.  - Thirty day monitor.  - Follow up with neurology. - Limited echocardiogram for edema.  - Follow up with me in a month. 04/13/2021  Patient underwent echocardiogram which showed normal cardiac function. Her monitor showed rare PACs and PVCs that were not symptomatic. No significant arrhythmias noted on her monitor. Patient did have presyncope and syncopal events. None of these occurred while she was wearing her heart monitor. From a symptom standpoint appears to be vagal in nature. I will asked that she repeat a tilt table test.  Given her chest pain I will asked that she have a stress test as well as she is post hysterectomy with bilateral oophorectomies.  -Schedule exercise stress test.  - Schedule tilt table testing.  - Continue current course but consider fludrocortisone or midodrine after tilt table test.    2. Migraines/altered mental state. Etiology unclear. - Plan as per above. 3. Chest pain     4. Peripheral edema     5. Dizziness  4/13/2021  Bindu Sagastume is here today for follow up for syncope/presyncope. AVTAR from 3/2021 showed sinus rhythm with tachycardia, very rare PAC's, and PVC's. At her last visit she was referred to neurology. She has had 4-5 episodes of near syncope in the last 2 weeks. Last episode she had chest pain that radiated into her arms. She has a history ovary removal and then her uterus a few years later due to endometriosis. She would like to have her ILR removed.  She is of work until 4/22/2021 due to her symptoms.   - Plan as per above. RECOMMENDATIONS:  1. Stress test- GXT Myoview to evaluate chest pain   2. Recommend TILT table testing and also loop recorder placement (have a  take you to the procedure)  3. Will hold of on medication changes until after testing   4. Follow up with me after procedure       QUALITY MEASURES  1. Tobacco Cessation Counseling: NA  2. Retake of BP if >140/90:   NA  3. Documentation to PCP/referring for new patient:  Sent to PCP at close of office visit  4. CAD patient on anti-platelet: NA  5. CAD patient on STATIN therapy:  NA  6. Patient with CHF and aFib on anticoagulation:  NA     All questions and concerns were addressed to the patient/family. Alternatives to my treatment were discussed. This note has been scribed in the presence of Paola Osorio MD by Domenica Jenkins RN. Dr. Tate White MD  Electrophysiology  [facility]. [address] [place] [address]. [address]. [address] [facility], [address]  Phone: [phone]  Fax: [phone]     NOTE: This report was transcribed using voice recognition software. Every effort was made to ensure accuracy, however, inadvertent computerized transcription errors may be present. The scribe's documentation has been prepared under my direction and personally reviewed by me in its entirety. I confirm that the note above accurately reflects all work, physical examination, the discussion of treatments and procedures, and medical decision making performed by me. Josie Richardson RN personally performed the services described in this documentation as scribed by nurse in my presence, and is both accurate and complete. Electronically signed by Domenica Jenkins RN on 4/13/2021 at 4:39 PM     The scribe's documentation has been prepared under my direction and personally reviewed by me in its entirety.  I confirm that the note above accurately reflects all work, physical examination, the discussion of treatments and procedures, and medical decision making performed by me. Siena Rasheed MD personally performed the services described in this documentation as scribed by nurse in my presence, and is both accurate and complete.     Electronically signed by Lashell Michel MD on 4/15/2021 at 9:58 AM

## 2021-04-13 NOTE — LETTER
[address] [place] [address] Cardiology - [address] [facility] [address] [facility]  Phone: [phone]  Fax: [phone]    Hayley Worthington MD        April 13, 2021     Nicole Dominguezett  [address]  1990      Dear Chago Sam: To whom it may concern,            It is recommended that Nicole Acevedo remain off work until testing is complete which should be July 1,2021. If you have any questions or concerns, please don't hesitate to call.     Sincerely,        Hayley Worthington MD

## 2021-04-15 ENCOUNTER — TELEPHONE (OUTPATIENT)
Dept: CARDIOLOGY CLINIC | Age: 31
End: 2021-04-15

## 2021-04-19 ENCOUNTER — TELEPHONE (OUTPATIENT)
Dept: CARDIOLOGY CLINIC | Age: 31
End: 2021-04-19

## 2021-04-19 NOTE — TELEPHONE ENCOUNTER
Pt's insurance company [employer] requested letter from [address] [place],[address] for pt to be off work. Now pt's leave is being extended by [address] [place] [employer] is needing more information than what was intimally requested. [place] they were going to fax form over last week. Did we receive? Please notify pt if received.  TY

## 2021-04-28 ENCOUNTER — TELEPHONE (OUTPATIENT)
Dept: CARDIOLOGY CLINIC | Age: 31
End: 2021-04-28

## 2021-04-28 NOTE — TELEPHONE ENCOUNTER
Pt calling in regards to [employer] requesting information about pt being off work. See encounter routed to [facility] from [place].  TY

## 2021-04-29 NOTE — TELEPHONE ENCOUNTER
Pt is calling saying that she needs to find out what is going on w/her disability paperwork, pt is getting very concerned because she has called and called and has never received a return call.   Please call pt asap

## 2021-04-29 NOTE — TELEPHONE ENCOUNTER
Alina Dorado       12:14 PM  Note    Pt calling in regards to [employer] requesting information about pt being off work. See encounter routed to [facility] from [place]. TY               12:14 PM  Alina Dorado routed this conversation to [facility]   April 29, 2021  Rubina Crow         9:56 AM  Note    Pt is calling saying that she needs to find out what is going on w/her disability paperwork, pt is getting very concerned because she has called and called and has never received a return call.   Please call pt asap

## 2021-04-30 RX ORDER — GABAPENTIN 100 MG/1
CAPSULE ORAL
Qty: 150 CAPSULE | Refills: 0 | Status: SHIPPED | OUTPATIENT
Start: 2021-04-30 | End: 2021-06-01 | Stop reason: SDUPTHER

## 2021-05-03 ENCOUNTER — TELEPHONE (OUTPATIENT)
Dept: CARDIOLOGY CLINIC | Age: 31
End: 2021-05-03

## 2021-05-05 ENCOUNTER — HOSPITAL ENCOUNTER (OUTPATIENT)
Dept: NUCLEAR MEDICINE | Age: 31
Discharge: HOME | End: 2021-05-05
Payer: COMMERCIAL

## 2021-05-05 ENCOUNTER — HOSPITAL ENCOUNTER (OUTPATIENT)
Dept: NON INVASIVE DIAGNOSTICS | Age: 31
Discharge: HOME | End: 2021-05-05
Payer: COMMERCIAL

## 2021-05-05 DIAGNOSIS — R55 SYNCOPE AND COLLAPSE: ICD-10-CM

## 2021-05-05 DIAGNOSIS — R07.2 PRECORDIAL PAIN: ICD-10-CM

## 2021-05-05 LAB
LV EF: 70 %
LVEF MODALITY: NORMAL

## 2021-05-05 PROCEDURE — 78452 HT MUSCLE IMAGE SPECT MULT: CPT

## 2021-05-05 PROCEDURE — 93017 CV STRESS TEST TRACING ONLY: CPT

## 2021-05-05 PROCEDURE — 3430000000 HC RX DIAGNOSTIC RADIOPHARMACEUTICAL: Performed by: INTERNAL MEDICINE

## 2021-05-05 PROCEDURE — A9502 TC99M TETROFOSMIN: HCPCS | Performed by: INTERNAL MEDICINE

## 2021-05-05 RX ADMIN — TETROFOSMIN 32.8 MILLICURIE: 1.38 INJECTION, POWDER, LYOPHILIZED, FOR SOLUTION INTRAVENOUS at 11:10

## 2021-05-05 RX ADMIN — TETROFOSMIN 10.2 MILLICURIE: 1.38 INJECTION, POWDER, LYOPHILIZED, FOR SOLUTION INTRAVENOUS at 09:48

## 2021-05-06 ENCOUNTER — TELEPHONE (OUTPATIENT)
Dept: CARDIOLOGY CLINIC | Age: 31
End: 2021-05-06

## 2021-05-06 DIAGNOSIS — R94.39 ABNORMAL STRESS TEST: Primary | ICD-10-CM

## 2021-05-06 NOTE — TELEPHONE ENCOUNTER
----- Message from Bronson Rodas MD sent at 5/6/2021 11:50 AM EDT -----  Please let patient know she needs a LHC. Please see which IC can do a LHC on her tomorrow. Would suggest Dr. Shivani Smith or Dr. Magy Urban. Thanks.

## 2021-05-06 NOTE — TELEPHONE ENCOUNTER
Spoke to pt. Explained procedure. Take all medications with sips of water. Informed her Juliette should call to verify arrival time of 7:30.  EVON

## 2021-05-06 NOTE — TELEPHONE ENCOUNTER
Dr Gisell Tompkins states he can perform cath on Fri 05/07 at 9:00. Patient can take all meds on day of cath.

## 2021-05-06 NOTE — TELEPHONE ENCOUNTER
Spoke with patient. Patient is scheduled with Dr. Augusto Oneill for Left Heart Cath on 5/7/21 at [facility], arrival time of 7:30am to the Cath Lab. Please have patient arrive to the main entrance of [facility] and check in with the registration desk. Remind patient to be NPO after midnight (8 hours prior). Do not apply lotions/creams on skin the day of procedure. COVID testing - not needed.

## 2021-05-06 NOTE — TELEPHONE ENCOUNTER
Pt called and said she is seeing on [employer] that she is having a procedure tomorrow @ 9 a.m. and doesn't know a thing about this. Pt never received stress test results, and was ask to confirm this procedure appt, which she did , but pt is very confused as what is going on. Please call pt asap and advise and explain.

## 2021-05-07 ENCOUNTER — HOSPITAL ENCOUNTER (OUTPATIENT)
Dept: CARDIAC CATH/INVASIVE PROCEDURES | Age: 31
Discharge: HOME | End: 2021-05-07
Attending: INTERNAL MEDICINE
Payer: COMMERCIAL

## 2021-05-07 VITALS — HEIGHT: 62 IN | WEIGHT: 210 LBS | BODY MASS INDEX: 38.64 KG/M2

## 2021-05-07 DIAGNOSIS — R94.39 ABNORMAL STRESS TEST: ICD-10-CM

## 2021-05-07 LAB
ANION GAP SERPL CALCULATED.3IONS-SCNC: 9 MMOL/L (ref 3–16)
BUN BLDV-MCNC: 15 MG/DL (ref 7–20)
CALCIUM SERPL-MCNC: 10.1 MG/DL (ref 8.3–10.6)
CHLORIDE BLD-SCNC: 101 MMOL/L (ref 99–110)
CHOLESTEROL, TOTAL: 199 MG/DL (ref 0–199)
CO2: 29 MMOL/L (ref 21–32)
CREAT SERPL-MCNC: 0.8 MG/DL (ref 0.6–1.1)
EKG ATRIAL RATE: 86 BPM
EKG DIAGNOSIS: NORMAL
EKG P AXIS: 50 DEGREES
EKG P-R INTERVAL: 186 MS
EKG Q-T INTERVAL: 364 MS
EKG QRS DURATION: 78 MS
EKG QTC CALCULATION (BAZETT): 435 MS
EKG R AXIS: 27 DEGREES
EKG T AXIS: 49 DEGREES
EKG VENTRICULAR RATE: 86 BPM
GFR AFRICAN AMERICAN: >60
GFR NON-AFRICAN AMERICAN: >60
GLUCOSE BLD-MCNC: 107 MG/DL (ref 70–99)
HCG QUALITATIVE: NEGATIVE
HCT VFR BLD CALC: 39.9 % (ref 36–48)
HDLC SERPL-MCNC: 62 MG/DL (ref 40–60)
HEMOGLOBIN: 13.4 G/DL (ref 12–16)
INR BLD: 1.02 (ref 0.86–1.14)
LDL CHOLESTEROL CALCULATED: 121 MG/DL
MCH RBC QN AUTO: 31.1 PG (ref 26–34)
MCHC RBC AUTO-ENTMCNC: 33.6 G/DL (ref 31–36)
MCV RBC AUTO: 92.5 FL (ref 80–100)
PDW BLD-RTO: 12.8 % (ref 12.4–15.4)
PLATELET # BLD: 261 K/UL (ref 135–450)
PMV BLD AUTO: 8.4 FL (ref 5–10.5)
POTASSIUM SERPL-SCNC: 4.1 MMOL/L (ref 3.5–5.1)
PROTHROMBIN TIME: 11.8 SEC (ref 10–13.2)
RBC # BLD: 4.31 M/UL (ref 4–5.2)
SODIUM BLD-SCNC: 139 MMOL/L (ref 136–145)
TRIGL SERPL-MCNC: 78 MG/DL (ref 0–150)
VLDLC SERPL CALC-MCNC: 16 MG/DL
WBC # BLD: 5.3 K/UL (ref 4–11)

## 2021-05-07 PROCEDURE — C1894 INTRO/SHEATH, NON-LASER: HCPCS

## 2021-05-07 PROCEDURE — 85610 PROTHROMBIN TIME: CPT

## 2021-05-07 PROCEDURE — 6360000004 HC RX CONTRAST MEDICATION

## 2021-05-07 PROCEDURE — 84703 CHORIONIC GONADOTROPIN ASSAY: CPT

## 2021-05-07 PROCEDURE — 6370000000 HC RX 637 (ALT 250 FOR IP)

## 2021-05-07 PROCEDURE — 2500000003 HC RX 250 WO HCPCS

## 2021-05-07 PROCEDURE — C1887 CATHETER, GUIDING: HCPCS

## 2021-05-07 PROCEDURE — 80061 LIPID PANEL: CPT

## 2021-05-07 PROCEDURE — 93005 ELECTROCARDIOGRAM TRACING: CPT | Performed by: INTERNAL MEDICINE

## 2021-05-07 PROCEDURE — 2709999900 HC NON-CHARGEABLE SUPPLY

## 2021-05-07 PROCEDURE — 85027 COMPLETE CBC AUTOMATED: CPT

## 2021-05-07 PROCEDURE — 93458 L HRT ARTERY/VENTRICLE ANGIO: CPT | Performed by: INTERNAL MEDICINE

## 2021-05-07 PROCEDURE — C1769 GUIDE WIRE: HCPCS

## 2021-05-07 PROCEDURE — 80048 BASIC METABOLIC PNL TOTAL CA: CPT

## 2021-05-07 PROCEDURE — 93458 L HRT ARTERY/VENTRICLE ANGIO: CPT

## 2021-05-07 PROCEDURE — 6360000002 HC RX W HCPCS

## 2021-05-07 RX ORDER — SODIUM CHLORIDE 0.9 % (FLUSH) 0.9 %
5-40 SYRINGE (ML) INJECTION EVERY 12 HOURS SCHEDULED
Status: CANCELLED | OUTPATIENT
Start: 2021-05-07

## 2021-05-07 RX ORDER — SODIUM CHLORIDE 0.9 % (FLUSH) 0.9 %
5-40 SYRINGE (ML) INJECTION PRN
Status: CANCELLED | OUTPATIENT
Start: 2021-05-07

## 2021-05-07 RX ORDER — HEPARIN SODIUM 1000 [USP'U]/ML
INJECTION, SOLUTION INTRAVENOUS; SUBCUTANEOUS
Status: COMPLETED | OUTPATIENT
Start: 2021-05-07 | End: 2021-05-07

## 2021-05-07 RX ORDER — FENTANYL CITRATE 50 UG/ML
INJECTION, SOLUTION INTRAMUSCULAR; INTRAVENOUS
Status: COMPLETED | OUTPATIENT
Start: 2021-05-07 | End: 2021-05-07

## 2021-05-07 RX ORDER — MIDAZOLAM HYDROCHLORIDE 5 MG/ML
INJECTION INTRAMUSCULAR; INTRAVENOUS
Status: COMPLETED | OUTPATIENT
Start: 2021-05-07 | End: 2021-05-07

## 2021-05-07 RX ORDER — SODIUM CHLORIDE 9 MG/ML
25 INJECTION, SOLUTION INTRAVENOUS PRN
Status: CANCELLED | OUTPATIENT
Start: 2021-05-07

## 2021-05-07 RX ORDER — FENTANYL CITRATE 50 UG/ML
25 INJECTION, SOLUTION INTRAMUSCULAR; INTRAVENOUS ONCE
Status: COMPLETED | OUTPATIENT
Start: 2021-05-07 | End: 2021-05-07

## 2021-05-07 RX ORDER — SODIUM CHLORIDE 9 MG/ML
1000 INJECTION, SOLUTION INTRAVENOUS CONTINUOUS
Status: DISCONTINUED | OUTPATIENT
Start: 2021-05-07 | End: 2021-05-07 | Stop reason: HOSPADM

## 2021-05-07 RX ORDER — ASPIRIN 325 MG
325 TABLET ORAL ONCE
Status: COMPLETED | OUTPATIENT
Start: 2021-05-07 | End: 2021-05-07

## 2021-05-07 RX ADMIN — Medication 325 MG: at 07:57

## 2021-05-07 RX ADMIN — HEPARIN SODIUM 5000 UNITS: 1000 INJECTION, SOLUTION INTRAVENOUS; SUBCUTANEOUS at 10:27

## 2021-05-07 RX ADMIN — FENTANYL CITRATE 50 MCG: 50 INJECTION, SOLUTION INTRAMUSCULAR; INTRAVENOUS at 10:12

## 2021-05-07 RX ADMIN — SODIUM CHLORIDE 1000 ML: 9 INJECTION, SOLUTION INTRAVENOUS at 07:57

## 2021-05-07 RX ADMIN — FENTANYL CITRATE 25 MCG: 50 INJECTION, SOLUTION INTRAMUSCULAR; INTRAVENOUS at 10:24

## 2021-05-07 RX ADMIN — FENTANYL CITRATE 25 MCG: 50 INJECTION, SOLUTION INTRAMUSCULAR; INTRAVENOUS at 13:17

## 2021-05-07 RX ADMIN — MIDAZOLAM HYDROCHLORIDE 2 MG: 5 INJECTION INTRAMUSCULAR; INTRAVENOUS at 10:12

## 2021-05-07 NOTE — PROGRESS NOTES
Wasted Fentanyl 175mg IV with Amanda Middleton RN.   Facundo Clock would not let us waste per the machine

## 2021-05-07 NOTE — PROCEDURES
CARDIAC CATHETERIZATION REPORT    Date of Procedure: 5/7/2021  : Tiffany Smiley DO  Primary Indication: Chest pain, abnormal nuclear SPECT stress test    Procedures Performed:  1. Coronary angiography  2. Left heart catheterization  3. Moderate conscious sedation    Procedural Details:  1. Access: Local anesthetic was given and access was obtained in the right radial artery using a micropuncture technique and a 6F Terumo Slender Sheath was placed without difficulty. 2. Diagnostic: A 5F TIG catheter was used to perform selective right and left coronary angiography. The 5F TIG catheter was also used to perform the left heart catheterization. No significant gradient was observed on pull-back of the catheter across the aortic valve. 3. Hemostasis: At the end of the procedure, the radial sheath was removed and a hemoband was placed over the arteriotomy site and filled with air to maintain hemostasis. Findings:  1. Hemodynamics:     A. Opening arterial pressure: 113/76 (93) mmHg      C. LVEDP: 15 mmHg    2. Coronary anatomy:  A. Left main artery: The left main artery bifurcates into the left anterior descending artery and left circumflex artery. The left main artery has no angiographically significant disease. B. Left anterior descending artery: Transapical vessel which gives rise to 3 diagonal arteries. The LAD has no angiographically significant disease. .  The diagonal arteries have no angiographically significant disease. C. Left circumflex artery: Non-dominant vessel that gives rise to 3 obtuse marginal arteries. The LCx has no angiographically significant disease. .  The obtuse marginal arteries have no angiographically significant disease. D. Right coronary artery: Dominant vessel that gives rise to the posterior descending artery and 2 posterolateral branches. The RCA has no angiographically significant disease. The posterior descending artery has no angiographically significant disease. The posterolateral branches have no angiographically significant disease. There was some mild catheter-induced spasm of the ostial and proximal RCA on catheter engagement. Technical Factors:  Complications: None. Estimated blood loss: Minimal.  Radiation: Air kerma 311 mGy and 1.5 minutes of fluoroscopy  Sedation: Moderate conscious sedation was administered by qualified nursing personnel under continuous hemodynamic monitoring, starting at 10:09 AM and ending at 10:33 AM.  Medications: 2.5 mg IA verapamil, 2 mg IV Versed, 75 mcg IV Fentanyl, 5,000 units IV heparin  Contrast: 55 cc of Opitray    Impression:  1. Normal coronary arteries. 2. LVEDP 15 mmHg. Plan:  1. Optimal medical therapy for CAD risk factors. 2. Consider PFTs to evaluate for both obstructive and restrictive lung disease. 3. It was recommend that she stop her keto diet and focus on weight through a well-balanced heart healthy diet and regular physical exercise for at least 30 minutes 3-5 times per week. 4. Follow-up with Ok Cazares in one month.       Crystal Lara, [address]

## 2021-05-07 NOTE — H&P
Brief Pre-Op Note/Sedation Assessment      Rishabh Potts  1990  Cath Pool Rm/NONE      [id number]  10:17 AM    Planned Procedure: Cardiac Catheterization Procedure    Post Procedure Plan: Return to same level of care    Consent: I have discussed with the patient and/or the patient representative the indication, alternatives, and the possible risks and/or complications of the planned procedure and the anesthesia methods. The patient and/or patient representative appear to understand and agree to proceed. Chief Complaint: Chest pain, abnormal nuclear SPECT stress test      Indications for Cath Procedure: Worsening Angina and Suspected CAD  Anginal Classification within 2 weeks:  CCS III - Symptoms with everyday living activities, i.e., moderate limitation  NYHA Heart Failure Class within 2 weeks: No symptoms  Is Cath Lab Visit Valve-related?: No  Surgical Risk: Low  Functional Type: < 4 METS    Anti- Anginal Meds within 2 weeks:   Yes: Aspirin    Stress or Imaging Studies Performed (within 6 months):  Stress Test with SPECT Result: Positive:  anterior Risk/Extent of Ischemia:  High     Vital Signs:  Ht 5' 2\" (1.575 m)   Wt 210 lb (95.3 kg)   LMP 08/07/2017 Comment: hyst 08/2017  BMI 38.41 kg/m²     Allergies:   Allergies   Allergen Reactions    Demerol Other (See Comments)     hallucinations    Meperidine Other (See Comments)    Percocet [Oxycodone-Acetaminophen] Other (See Comments)     hallucinations    Procardia [Nifedipine] Rash       Past Medical History:  Past Medical History:   Diagnosis Date    Anemia     Currently on Iron PO    Anxiety     Heart abnormalities     \"heart flutters real fast\"    Miscarriage 2009    Ovarian cyst     Tricuspid regurgitation          Surgical History:  Past Surgical History:   Procedure Laterality Date    APPENDECTOMY  2006    ENDOMETRIAL ABLATION  02/01/2017    With Tubal ligation    HYSTERECTOMY      INSERTABLE CARDIAC MONITOR  05/24/2017    Loop Recorder Left Chest    OVARIAN CYST SURGERY           Medications:  Current Facility-Administered Medications   Medication Dose Route Frequency Provider Last Rate Last Admin    0.9 % sodium chloride infusion  1,000 mL Intravenous Continuous Antione Pulido DO 30 mL/hr at 05/07/21 0757 1,000 mL at 05/07/21 0757           Pre-Sedation:    Pre-Sedation Documentation and Exam:  I have personally completed a history, physical exam & review of systems for this patient (see notes). Prior History of Anesthesia Complications:   none    Modified Mallampati:  II (soft palate, uvula, fauces visible)    ASA Classification:  Class 3 - A patient with severe systemic disease that limits activity but is not incapacitating      Carolina Scale: Activity:  2 - Able to move 4 extremities voluntarily on command  Respiration:  2 - Able to breathe deeply and cough freely  Circulation:  2 - BP+/- 20mmHg of normal  Consciousness:  2 - Fully awake  Oxygen Saturation (color):  2 - Able to maintain oxygen saturation >92% on room air    Sedation/Anesthesia Plan:  Guard the patient's safety and welfare. Minimize physical discomfort and pain. Minimize negative psychological responses to treatment by providing sedation and analgesia and maximize the potential amnesia. Patient to meet pre-procedure discharge plan.     Medication Planned:  midazolam intravenously and fentanyl intravenously    Patient is an appropriate candidate for plan of sedation: yes      Electronically signed by Meghan Pulido DO on 5/7/2021 at 10:17 AM

## 2021-05-14 ENCOUNTER — HOSPITAL ENCOUNTER (OUTPATIENT)
Dept: CARDIAC CATH/INVASIVE PROCEDURES | Age: 31
Discharge: HOME | End: 2021-05-14
Attending: INTERNAL MEDICINE | Admitting: INTERNAL MEDICINE
Payer: COMMERCIAL

## 2021-05-14 LAB
A/G RATIO: 1.3 (ref 1.1–2.2)
ALBUMIN SERPL-MCNC: 4.3 G/DL (ref 3.4–5)
ALP BLD-CCNC: 94 U/L (ref 40–129)
ALT SERPL-CCNC: 42 U/L (ref 10–40)
ANION GAP SERPL CALCULATED.3IONS-SCNC: 9 MMOL/L (ref 3–16)
AST SERPL-CCNC: 28 U/L (ref 15–37)
BILIRUB SERPL-MCNC: 0.4 MG/DL (ref 0–1)
BUN BLDV-MCNC: 16 MG/DL (ref 7–20)
CALCIUM SERPL-MCNC: 10 MG/DL (ref 8.3–10.6)
CHLORIDE BLD-SCNC: 99 MMOL/L (ref 99–110)
CO2: 28 MMOL/L (ref 21–32)
CREAT SERPL-MCNC: 0.8 MG/DL (ref 0.6–1.1)
EKG ATRIAL RATE: 78 BPM
EKG DIAGNOSIS: NORMAL
EKG P AXIS: 37 DEGREES
EKG P-R INTERVAL: 164 MS
EKG Q-T INTERVAL: 380 MS
EKG QRS DURATION: 82 MS
EKG QTC CALCULATION (BAZETT): 433 MS
EKG R AXIS: 46 DEGREES
EKG T AXIS: 53 DEGREES
EKG VENTRICULAR RATE: 78 BPM
GFR AFRICAN AMERICAN: >60
GFR NON-AFRICAN AMERICAN: >60
GLOBULIN: 3.4 G/DL
GLUCOSE BLD-MCNC: 97 MG/DL (ref 70–99)
HCT VFR BLD CALC: 40.2 % (ref 36–48)
HEMOGLOBIN: 13.7 G/DL (ref 12–16)
INR BLD: 0.99 (ref 0.86–1.14)
MCH RBC QN AUTO: 31.1 PG (ref 26–34)
MCHC RBC AUTO-ENTMCNC: 34 G/DL (ref 31–36)
MCV RBC AUTO: 91.6 FL (ref 80–100)
PDW BLD-RTO: 12.8 % (ref 12.4–15.4)
PLATELET # BLD: 269 K/UL (ref 135–450)
PMV BLD AUTO: 8.3 FL (ref 5–10.5)
POTASSIUM SERPL-SCNC: 4.2 MMOL/L (ref 3.5–5.1)
PROTHROMBIN TIME: 11.5 SEC (ref 10–13.2)
RBC # BLD: 4.39 M/UL (ref 4–5.2)
SODIUM BLD-SCNC: 136 MMOL/L (ref 136–145)
TOTAL PROTEIN: 7.7 G/DL (ref 6.4–8.2)
WBC # BLD: 5.7 K/UL (ref 4–11)

## 2021-05-14 PROCEDURE — 2500000003 HC RX 250 WO HCPCS

## 2021-05-14 PROCEDURE — 93010 ELECTROCARDIOGRAM REPORT: CPT | Performed by: INTERNAL MEDICINE

## 2021-05-14 PROCEDURE — 93660 TILT TABLE EVALUATION: CPT

## 2021-05-14 PROCEDURE — 93660 TILT TABLE EVALUATION: CPT | Performed by: INTERNAL MEDICINE

## 2021-05-14 PROCEDURE — 33286 RMVL SUBQ CAR RHYTHM MNTR: CPT

## 2021-05-14 PROCEDURE — 93005 ELECTROCARDIOGRAM TRACING: CPT | Performed by: INTERNAL MEDICINE

## 2021-05-14 PROCEDURE — 80053 COMPREHEN METABOLIC PANEL: CPT

## 2021-05-14 PROCEDURE — 6360000002 HC RX W HCPCS

## 2021-05-14 PROCEDURE — 85610 PROTHROMBIN TIME: CPT

## 2021-05-14 PROCEDURE — 99152 MOD SED SAME PHYS/QHP 5/>YRS: CPT | Performed by: INTERNAL MEDICINE

## 2021-05-14 PROCEDURE — 85027 COMPLETE CBC AUTOMATED: CPT

## 2021-05-14 PROCEDURE — 33286 RMVL SUBQ CAR RHYTHM MNTR: CPT | Performed by: INTERNAL MEDICINE

## 2021-05-14 RX ORDER — NITROGLYCERIN 0.3 MG/1
0.3 TABLET SUBLINGUAL EVERY 5 MIN PRN
Qty: 30 TABLET | Refills: 3 | Status: SHIPPED | OUTPATIENT
Start: 2021-05-14

## 2021-05-14 RX ORDER — FENTANYL CITRATE 50 UG/ML
INJECTION, SOLUTION INTRAMUSCULAR; INTRAVENOUS
Status: COMPLETED | OUTPATIENT
Start: 2021-05-14 | End: 2021-05-14

## 2021-05-14 RX ORDER — SODIUM CHLORIDE 0.9 % (FLUSH) 0.9 %
5-40 SYRINGE (ML) INJECTION EVERY 12 HOURS SCHEDULED
Status: DISCONTINUED | OUTPATIENT
Start: 2021-05-14 | End: 2021-05-14 | Stop reason: HOSPADM

## 2021-05-14 RX ORDER — MIDAZOLAM HYDROCHLORIDE 5 MG/ML
INJECTION INTRAMUSCULAR; INTRAVENOUS
Status: COMPLETED | OUTPATIENT
Start: 2021-05-14 | End: 2021-05-14

## 2021-05-14 RX ORDER — SODIUM CHLORIDE 9 MG/ML
INJECTION, SOLUTION INTRAVENOUS ONCE
Status: DISCONTINUED | OUTPATIENT
Start: 2021-05-14 | End: 2021-05-14 | Stop reason: HOSPADM

## 2021-05-14 RX ORDER — SODIUM CHLORIDE 9 MG/ML
25 INJECTION, SOLUTION INTRAVENOUS PRN
Status: DISCONTINUED | OUTPATIENT
Start: 2021-05-14 | End: 2021-05-14 | Stop reason: HOSPADM

## 2021-05-14 RX ORDER — DOXYCYCLINE HYCLATE 100 MG
100 TABLET ORAL 2 TIMES DAILY
Qty: 10 TABLET | Refills: 0 | Status: SHIPPED | OUTPATIENT
Start: 2021-05-14 | End: 2021-05-19

## 2021-05-14 RX ORDER — SODIUM CHLORIDE 0.9 % (FLUSH) 0.9 %
5-40 SYRINGE (ML) INJECTION PRN
Status: DISCONTINUED | OUTPATIENT
Start: 2021-05-14 | End: 2021-05-14 | Stop reason: HOSPADM

## 2021-05-14 RX ORDER — PROPRANOLOL HCL 60 MG
60 CAPSULE, EXTENDED RELEASE 24HR ORAL DAILY
Qty: 30 CAPSULE | Refills: 3 | Status: SHIPPED | OUTPATIENT
Start: 2021-05-14 | End: 2021-05-27 | Stop reason: ALTCHOICE

## 2021-05-14 RX ORDER — SODIUM CHLORIDE 9 MG/ML
INJECTION, SOLUTION INTRAVENOUS ONCE
Status: DISCONTINUED | OUTPATIENT
Start: 2021-05-14 | End: 2021-05-14

## 2021-05-14 RX ADMIN — FENTANYL CITRATE 50 MCG: 50 INJECTION, SOLUTION INTRAMUSCULAR; INTRAVENOUS at 13:26

## 2021-05-14 RX ADMIN — FENTANYL CITRATE 50 MCG: 50 INJECTION, SOLUTION INTRAMUSCULAR; INTRAVENOUS at 13:30

## 2021-05-14 RX ADMIN — MIDAZOLAM HYDROCHLORIDE 2 MG: 5 INJECTION INTRAMUSCULAR; INTRAVENOUS at 13:24

## 2021-05-14 RX ADMIN — MIDAZOLAM HYDROCHLORIDE 2 MG: 5 INJECTION INTRAMUSCULAR; INTRAVENOUS at 13:29

## 2021-05-14 RX ADMIN — FENTANYL CITRATE 50 MCG: 50 INJECTION, SOLUTION INTRAMUSCULAR; INTRAVENOUS at 13:24

## 2021-05-14 NOTE — H&P
[address]   Cardiology Admission Note              Date:   5/14/2021  Patient name: Tanya Christianson  Date of admission:  5/14/2021  9:51 AM  MRN:   [id number]  YOB: 1990    Primary Care physician: Sunni Juarez MD    Reason for Admission:  arrhythmia    CHIEF COMPLAINT:  Syncope and palpitations. History Obtained From:  patient    HISTORY OF PRESENT ILLNESS:    Patient is a pleasant 28-year-old female with a medical history significant for fibromyalgia, sinus tachycardia, and syncope/presyncope who presents from home for tilt table test.  She recently had [facility] for abnormal stress test which showed no significant disease. She now presents from home for tilt table testing. Past Medical History:   has a past medical history of Anemia, Anxiety, Heart abnormalities, Miscarriage, Ovarian cyst, and Tricuspid regurgitation. Past Surgical History:   has a past surgical history that includes Appendectomy (2006); Ovarian cyst surgery; Endometrial ablation (02/01/2017); Insertable Cardiac Monitor (05/24/2017); Hysterectomy; and Cardiac catheterization (05/07/2021). Home Medications:    Prior to Admission medications    Medication Sig Start Date End Date Taking? Authorizing Provider   gabapentin (NEURONTIN) 100 MG capsule TAKE FIVE CAPSULES BY MOUTH DAILY 4/30/21 5/28/21  Sunni Juarez MD   amoxicillin (AMOXIL) 500 MG capsule  2/1/21   Historical Provider, MD   nabumetone (RELAFEN) 500 MG tablet Take 1 tablet by mouth 2 times daily 1/28/21   Sunni Juarez MD   PREMARIN 0.625 MG tablet Take 0.625 mg by mouth daily 11/2/20   Historical Provider, MD       Allergies:  Demerol, Percocet [oxycodone-acetaminophen], and Procardia [nifedipine]    Social History:   reports that she has never smoked. She has never used smokeless tobacco. She reports that she does not drink alcohol or use drugs.      Family History: family history includes Arthritis in her mother; Cancer in her maternal grandmother and paternal grandmother; Mental Retardation in her maternal uncle. REVIEW OF SYSTEMS:    · Constitutional: there has been no unanticipated weight loss. There's been no change in energy level, sleep pattern, or activity level. · Eyes: No visual changes or diplopia. No scleral icterus. · ENT: No Headaches, hearing loss or vertigo. No mouth sores or sore throat. · Cardiovascular: No chest pain, No dyspnea on exertion, No palpitations or No loss of consciousness. No cough, hemoptysis, No pleuritic pain, or phlebitis. · Respiratory: No cough or wheezing, no sputum production. No hematemesis. · Gastrointestinal: No abdominal pain, appetite loss, blood in stools. No change in bowel or bladder habits. · Genitourinary: No dysuria, trouble voiding, or hematuria. · Musculoskeletal:  No gait disturbance, No weakness or joint complaints. · Integumentary: No rash or pruritis. · Neurological: No headache, diplopia, change in muscle strength, numbness or tingling. No change in gait, balance, coordination, mood, affect, memory, mentation, behavior. · Psychiatric: No anxiety, or depression. · Endocrine: No temperature intolerance. No excessive thirst, fluid intake, or urination. No tremor. · Hematologic/Lymphatic: No abnormal bruising or bleeding, blood clots or swollen lymph nodes. · Allergic/Immunologic: No nasal congestion or hives. PHYSICAL EXAM:    Physical Examination:    Vital Signs:           Last menstrual period 08/07/2017, not currently breastfeeding. No data recorded     Admission Weight:         I/O:   No intake or output data in the 24 hours ending 05/14/21 1119         Vent Settings:          Constitutional and General Appearance: alert, cooperative, no distress and appears stated age  HEENT: PERRL, no cervical lymphadenopathy. No masses palpable.  Normal oral mucosa  Respiratory:  · Normal excursion and expansion without use of accessory muscles  · Resp Auscultation: Normal breath sounds without dullness or wheezing  Cardiovascular:  · The apical impulse is not displaced  · RRR S1S2 w/o M/G/R  Abdomen:  · No masses or tenderness  · Bowel sounds present  Extremities:  ·  No Cyanosis or Clubbing  ·  Lower extremity edema: No  ·  Skin: Warm and dry  Neurological:  · Alert and oriented. · Moves all extremities well  · No abnormalities of mood, affect, memory, mentation, or behavior are noted    DATA:    CARDIOLOGY LABS:   CBC:   Recent Labs     21  1100   WBC 5.7   HGB 13.7   HCT 40.2        BMP: No results for input(s): NA, K, CO2, BUN, CREATININE, LABGLOM, GLUCOSE in the last 72 hours. BNP: No results for input(s): BNP in the last 72 hours. PT/INR:   Recent Labs     21  1100   PROTIME 11.5   INR 0.99     APTT:No results for input(s): APTT in the last 72 hours. CARDIAC ENZYMES:No results for input(s): CKMB, CKMBINDEX, TROPONINI in the last 72 hours. Invalid input(s): CKTOTAL;3  FASTING LIPID PANEL:  Lab Results   Component Value Date    HDL 62 2021    LDLCALC 121 2021    TRIG 78 2021     LIVER PROFILE:No results for input(s): AST, ALT, ALB in the last 72 hours. IMPRESSION:    Patient Active Problem List   Diagnosis     in first trimester    Palpitation    Tachycardia    Syncope and collapse    Patellofemoral stress syndrome of both knees    Encounter for loop recorder check    H/O: hysterectomy    Fibromyalgia    Anxiety    Blood pressure elevated without history of HTN    Pedal edema    Chest pain    Abnormal stress test     RECOMMENDATIONS:  1. Tilt table testing. Discussed with patient and nursing. All questions and concerns were addressed to the patient/family. Alternatives to my treatment were discussed. The note was completed using EMR. Every effort was made to ensure accuracy; however, inadvertent computerized transcription errors may be present.     Xiao Peacock MD  Cardiac Electrophysiology  [facility]

## 2021-05-15 ENCOUNTER — HOSPITAL ENCOUNTER (EMERGENCY)
Age: 31
Discharge: HOME | End: 2021-05-15
Attending: EMERGENCY MEDICINE
Payer: COMMERCIAL

## 2021-05-15 VITALS
TEMPERATURE: 98.3 F | DIASTOLIC BLOOD PRESSURE: 78 MMHG | RESPIRATION RATE: 18 BRPM | WEIGHT: 202 LBS | HEIGHT: 62 IN | BODY MASS INDEX: 37.17 KG/M2 | HEART RATE: 70 BPM | SYSTOLIC BLOOD PRESSURE: 128 MMHG | OXYGEN SATURATION: 98 %

## 2021-05-15 DIAGNOSIS — N61.0 CELLULITIS OF BREAST: Primary | ICD-10-CM

## 2021-05-15 PROCEDURE — 6360000002 HC RX W HCPCS: Performed by: EMERGENCY MEDICINE

## 2021-05-15 PROCEDURE — 96372 THER/PROPH/DIAG INJ SC/IM: CPT

## 2021-05-15 PROCEDURE — 6370000000 HC RX 637 (ALT 250 FOR IP): Performed by: EMERGENCY MEDICINE

## 2021-05-15 PROCEDURE — 99285 EMERGENCY DEPT VISIT HI MDM: CPT

## 2021-05-15 RX ORDER — IBUPROFEN 800 MG/1
800 TABLET ORAL EVERY 6 HOURS PRN
COMMUNITY
End: 2021-05-15

## 2021-05-15 RX ORDER — KETOROLAC TROMETHAMINE 10 MG/1
10 TABLET, FILM COATED ORAL EVERY 8 HOURS PRN
Qty: 15 TABLET | Refills: 0 | Status: SHIPPED | OUTPATIENT
Start: 2021-05-15 | End: 2021-06-24

## 2021-05-15 RX ORDER — ACETAMINOPHEN 500 MG
500 TABLET ORAL EVERY 6 HOURS PRN
COMMUNITY
End: 2022-02-22

## 2021-05-15 RX ORDER — HYDROCODONE BITARTRATE AND ACETAMINOPHEN 5; 325 MG/1; MG/1
1 TABLET ORAL ONCE
Status: COMPLETED | OUTPATIENT
Start: 2021-05-15 | End: 2021-05-15

## 2021-05-15 RX ORDER — KETOROLAC TROMETHAMINE 30 MG/ML
30 INJECTION, SOLUTION INTRAMUSCULAR; INTRAVENOUS ONCE
Status: COMPLETED | OUTPATIENT
Start: 2021-05-15 | End: 2021-05-15

## 2021-05-15 RX ADMIN — KETOROLAC TROMETHAMINE 30 MG: 30 INJECTION, SOLUTION INTRAMUSCULAR; INTRAVENOUS at 13:14

## 2021-05-15 RX ADMIN — HYDROCODONE BITARTRATE AND ACETAMINOPHEN 1 TABLET: 5; 325 TABLET ORAL at 13:14

## 2021-05-15 ASSESSMENT — PAIN DESCRIPTION - ORIENTATION: ORIENTATION: LEFT

## 2021-05-15 ASSESSMENT — PAIN SCALES - GENERAL: PAINLEVEL_OUTOF10: 5

## 2021-05-15 ASSESSMENT — PAIN DESCRIPTION - DESCRIPTORS: DESCRIPTORS: CONSTANT

## 2021-05-15 ASSESSMENT — PAIN DESCRIPTION - LOCATION: LOCATION: BREAST

## 2021-05-15 NOTE — ED NOTES
No s/s infection noted from L breast wound. Pt denies fevers, SOB or further c/o's.  Resps even and unlab     Gattis Oh Laurel Finch RN  05/15/21 4300

## 2021-05-17 ENCOUNTER — APPOINTMENT (OUTPATIENT)
Dept: GENERAL RADIOLOGY | Age: 31
End: 2021-05-17
Payer: COMMERCIAL

## 2021-05-17 ENCOUNTER — APPOINTMENT (OUTPATIENT)
Dept: CT IMAGING | Age: 31
End: 2021-05-17
Payer: COMMERCIAL

## 2021-05-17 ENCOUNTER — HOSPITAL ENCOUNTER (EMERGENCY)
Age: 31
Discharge: HOME | End: 2021-05-17
Payer: COMMERCIAL

## 2021-05-17 ENCOUNTER — TELEPHONE (OUTPATIENT)
Dept: CARDIOLOGY CLINIC | Age: 31
End: 2021-05-17

## 2021-05-17 VITALS
DIASTOLIC BLOOD PRESSURE: 74 MMHG | TEMPERATURE: 98.3 F | SYSTOLIC BLOOD PRESSURE: 121 MMHG | WEIGHT: 208 LBS | HEART RATE: 68 BPM | BODY MASS INDEX: 38.28 KG/M2 | HEIGHT: 62 IN | OXYGEN SATURATION: 100 % | RESPIRATION RATE: 18 BRPM

## 2021-05-17 DIAGNOSIS — G89.18 POSTOPERATIVE PAIN: Primary | ICD-10-CM

## 2021-05-17 PROBLEM — Z45.09 ENCOUNTER FOR LOOP RECORDER CHECK: Status: RESOLVED | Noted: 2017-05-24 | Resolved: 2021-05-17

## 2021-05-17 LAB
A/G RATIO: 1.3 (ref 1.1–2.2)
ALBUMIN SERPL-MCNC: 4.1 G/DL (ref 3.4–5)
ALP BLD-CCNC: 82 U/L (ref 40–129)
ALT SERPL-CCNC: 39 U/L (ref 10–40)
ANION GAP SERPL CALCULATED.3IONS-SCNC: 10 MMOL/L (ref 3–16)
AST SERPL-CCNC: 31 U/L (ref 15–37)
BASOPHILS ABSOLUTE: 0.1 K/UL (ref 0–0.2)
BASOPHILS RELATIVE PERCENT: 0.9 %
BILIRUB SERPL-MCNC: 0.3 MG/DL (ref 0–1)
BUN BLDV-MCNC: 21 MG/DL (ref 7–20)
CALCIUM SERPL-MCNC: 9.8 MG/DL (ref 8.3–10.6)
CHLORIDE BLD-SCNC: 104 MMOL/L (ref 99–110)
CO2: 27 MMOL/L (ref 21–32)
CREAT SERPL-MCNC: 0.8 MG/DL (ref 0.6–1.1)
EKG ATRIAL RATE: 62 BPM
EKG DIAGNOSIS: NORMAL
EKG P AXIS: 54 DEGREES
EKG P-R INTERVAL: 170 MS
EKG Q-T INTERVAL: 396 MS
EKG QRS DURATION: 82 MS
EKG QTC CALCULATION (BAZETT): 401 MS
EKG R AXIS: 51 DEGREES
EKG T AXIS: 62 DEGREES
EKG VENTRICULAR RATE: 62 BPM
EOSINOPHILS ABSOLUTE: 0.1 K/UL (ref 0–0.6)
EOSINOPHILS RELATIVE PERCENT: 1.1 %
GFR AFRICAN AMERICAN: >60
GFR NON-AFRICAN AMERICAN: >60
GLOBULIN: 3.2 G/DL
GLUCOSE BLD-MCNC: 97 MG/DL (ref 70–99)
HCT VFR BLD CALC: 39.9 % (ref 36–48)
HEMOGLOBIN: 13.3 G/DL (ref 12–16)
LYMPHOCYTES ABSOLUTE: 1.9 K/UL (ref 1–5.1)
LYMPHOCYTES RELATIVE PERCENT: 31.9 %
MCH RBC QN AUTO: 30.9 PG (ref 26–34)
MCHC RBC AUTO-ENTMCNC: 33.4 G/DL (ref 31–36)
MCV RBC AUTO: 92.6 FL (ref 80–100)
MONOCYTES ABSOLUTE: 0.4 K/UL (ref 0–1.3)
MONOCYTES RELATIVE PERCENT: 6.2 %
NEUTROPHILS ABSOLUTE: 3.5 K/UL (ref 1.7–7.7)
NEUTROPHILS RELATIVE PERCENT: 59.9 %
PDW BLD-RTO: 13 % (ref 12.4–15.4)
PLATELET # BLD: 272 K/UL (ref 135–450)
PMV BLD AUTO: 8.4 FL (ref 5–10.5)
POTASSIUM SERPL-SCNC: 4.1 MMOL/L (ref 3.5–5.1)
RBC # BLD: 4.31 M/UL (ref 4–5.2)
SODIUM BLD-SCNC: 141 MMOL/L (ref 136–145)
TOTAL PROTEIN: 7.3 G/DL (ref 6.4–8.2)
TROPONIN: <0.01 NG/ML
WBC # BLD: 5.9 K/UL (ref 4–11)

## 2021-05-17 PROCEDURE — 80053 COMPREHEN METABOLIC PANEL: CPT

## 2021-05-17 PROCEDURE — 93005 ELECTROCARDIOGRAM TRACING: CPT | Performed by: PHYSICIAN ASSISTANT

## 2021-05-17 PROCEDURE — 96375 TX/PRO/DX INJ NEW DRUG ADDON: CPT

## 2021-05-17 PROCEDURE — 85025 COMPLETE CBC W/AUTO DIFF WBC: CPT

## 2021-05-17 PROCEDURE — 96374 THER/PROPH/DIAG INJ IV PUSH: CPT

## 2021-05-17 PROCEDURE — 84484 ASSAY OF TROPONIN QUANT: CPT

## 2021-05-17 PROCEDURE — 71045 X-RAY EXAM CHEST 1 VIEW: CPT

## 2021-05-17 PROCEDURE — 99285 EMERGENCY DEPT VISIT HI MDM: CPT

## 2021-05-17 PROCEDURE — 6360000004 HC RX CONTRAST MEDICATION: Performed by: PHYSICIAN ASSISTANT

## 2021-05-17 PROCEDURE — 6370000000 HC RX 637 (ALT 250 FOR IP): Performed by: PHYSICIAN ASSISTANT

## 2021-05-17 PROCEDURE — 6360000002 HC RX W HCPCS: Performed by: PHYSICIAN ASSISTANT

## 2021-05-17 PROCEDURE — 93010 ELECTROCARDIOGRAM REPORT: CPT | Performed by: INTERNAL MEDICINE

## 2021-05-17 PROCEDURE — 71260 CT THORAX DX C+: CPT

## 2021-05-17 RX ORDER — OXYCODONE HYDROCHLORIDE AND ACETAMINOPHEN 5; 325 MG/1; MG/1
1 TABLET ORAL ONCE
Status: COMPLETED | OUTPATIENT
Start: 2021-05-17 | End: 2021-05-17

## 2021-05-17 RX ORDER — ONDANSETRON 2 MG/ML
4 INJECTION INTRAMUSCULAR; INTRAVENOUS ONCE
Status: COMPLETED | OUTPATIENT
Start: 2021-05-17 | End: 2021-05-17

## 2021-05-17 RX ORDER — KETOROLAC TROMETHAMINE 30 MG/ML
30 INJECTION, SOLUTION INTRAMUSCULAR; INTRAVENOUS ONCE
Status: COMPLETED | OUTPATIENT
Start: 2021-05-17 | End: 2021-05-17

## 2021-05-17 RX ORDER — ONDANSETRON 4 MG/1
4 TABLET, ORALLY DISINTEGRATING ORAL ONCE
Status: COMPLETED | OUTPATIENT
Start: 2021-05-17 | End: 2021-05-17

## 2021-05-17 RX ORDER — OXYCODONE HYDROCHLORIDE AND ACETAMINOPHEN 5; 325 MG/1; MG/1
1 TABLET ORAL EVERY 8 HOURS PRN
Qty: 12 TABLET | Refills: 0 | Status: SHIPPED | OUTPATIENT
Start: 2021-05-17 | End: 2021-05-20

## 2021-05-17 RX ORDER — MORPHINE SULFATE 4 MG/ML
4 INJECTION, SOLUTION INTRAMUSCULAR; INTRAVENOUS ONCE
Status: COMPLETED | OUTPATIENT
Start: 2021-05-17 | End: 2021-05-17

## 2021-05-17 RX ORDER — ONDANSETRON 4 MG/1
4 TABLET, ORALLY DISINTEGRATING ORAL EVERY 8 HOURS PRN
Qty: 30 TABLET | Refills: 0 | Status: SHIPPED | OUTPATIENT
Start: 2021-05-17 | End: 2021-07-09 | Stop reason: ALTCHOICE

## 2021-05-17 RX ADMIN — ONDANSETRON 4 MG: 2 INJECTION INTRAMUSCULAR; INTRAVENOUS at 13:04

## 2021-05-17 RX ADMIN — OXYCODONE HYDROCHLORIDE AND ACETAMINOPHEN 1 TABLET: 5; 325 TABLET ORAL at 16:12

## 2021-05-17 RX ADMIN — KETOROLAC TROMETHAMINE 30 MG: 30 INJECTION, SOLUTION INTRAMUSCULAR; INTRAVENOUS at 14:04

## 2021-05-17 RX ADMIN — MORPHINE SULFATE 4 MG: 4 INJECTION, SOLUTION INTRAMUSCULAR; INTRAVENOUS at 13:04

## 2021-05-17 RX ADMIN — ONDANSETRON 4 MG: 4 TABLET, ORALLY DISINTEGRATING ORAL at 16:11

## 2021-05-17 RX ADMIN — IOPAMIDOL 75 ML: 755 INJECTION, SOLUTION INTRAVENOUS at 14:47

## 2021-05-17 ASSESSMENT — PAIN DESCRIPTION - LOCATION: LOCATION: BREAST

## 2021-05-17 ASSESSMENT — PAIN DESCRIPTION - DESCRIPTORS: DESCRIPTORS: STABBING

## 2021-05-17 ASSESSMENT — PAIN DESCRIPTION - ORIENTATION: ORIENTATION: LEFT

## 2021-05-17 ASSESSMENT — PAIN SCALES - GENERAL: PAINLEVEL_OUTOF10: 4

## 2021-05-17 NOTE — TELEPHONE ENCOUNTER
Patient calling stating she was in the ED over the weekend and has been experiencing a lot of pain and warmth in her breast where her ILR was removed. She was starting on doxycycline on Saturday. Instructed patient to go to [facility] ED today.

## 2021-05-17 NOTE — ED PROVIDER NOTES
[address] [facility]      CHIEF COMPLAINT  Breast Pain (States pain to left breast after having loop recorder removed yesterday. States seen @ [facility] ER before coming to this ER for same c/o's)       HISTORY OF PRESENT ILLNESS  Dennise Keating is a 31 y.o. female  who presents to the ED complaining of left breast pain. The patient states that she had a loop recorder removed on 5/14 and has had subsequent pain and swelling since the removal.  She states that she has 3 sutures and Dermabond with a gauze placed over the wound, has noticed increased pain and swelling since yesterday. She was at [facility] before coming here, she states that a chest x-ray was performed and was reportedly unremarkable. She states that she was also given a prescription for doxycycline. She states that she continued to have pain, so she presents here. She describes swelling and pain to her left breast, denies any fevers or chills. She denies any chest pain or shortness of breath. No other complaints, modifying factors or associated symptoms. I have reviewed the following from the nursing documentation.     Past Medical History:   Diagnosis Date    Anemia     Currently on Iron PO    Anxiety     Heart abnormalities     \"heart flutters real fast\"    Miscarriage 2009    Ovarian cyst     Tricuspid regurgitation      Past Surgical History:   Procedure Laterality Date    APPENDECTOMY  2006    CARDIAC CATHETERIZATION  05/07/2021    WNL    ENDOMETRIAL ABLATION  02/01/2017    With Tubal ligation    HYSTERECTOMY      INSERTABLE CARDIAC MONITOR  05/24/2017    Loop Recorder Left Chest    OVARIAN CYST SURGERY       Family History   Problem Relation Age of Onset    Cancer Maternal Grandmother         Thyroid    Cancer Paternal Grandmother     Arthritis Mother     Mental Retardation Maternal Uncle      Social History     Socioeconomic History    Marital status:      Spouse name: Not on file    Number of children: Not on file    Years of education: Not on file    Highest education level: Not on file   Occupational History    Not on file   Tobacco Use    Smoking status: Never Smoker    Smokeless tobacco: Never Used   Vaping Use    Vaping Use: Never used   Substance and Sexual Activity    Alcohol use: No    Drug use: No    Sexual activity: Yes     Partners: Male   Other Topics Concern    Not on file   Social History Narrative    Not on file     Social Determinants of Health     Financial Resource Strain:     Difficulty of Paying Living Expenses:    Food Insecurity:     Worried About Running Out of Food in the Last Year:     [address] in the Last Year:    Transportation Needs:     Lack of Transportation (Medical):  Lack of Transportation (Non-Medical):    Physical Activity:     Days of Exercise per Week:     Minutes of Exercise per Session:    Stress:     Feeling of Stress :    Social Connections:     Frequency of Communication with Friends and Family:     Frequency of Social Gatherings with Friends and Family:     Attends [employer] Services:     Active Member of Clubs or Organizations:     Attends Club or Organization Meetings:     Marital Status:    Intimate Partner Violence:     Fear of Current or Ex-Partner:     Emotionally Abused:     Physically Abused:     Sexually Abused:      No current facility-administered medications for this encounter.      Current Outpatient Medications   Medication Sig Dispense Refill    acetaminophen (TYLENOL) 500 MG tablet Take 500 mg by mouth every 6 hours as needed for Pain      ketorolac (TORADOL) 10 MG tablet Take 1 tablet by mouth every 8 hours as needed for Pain 15 tablet 0    propranolol (INDERAL LA) 60 MG extended release capsule Take 1 capsule by mouth daily 30 capsule 3    doxycycline hyclate (VIBRA-TABS) 100 MG tablet Take 1 tablet by mouth 2 times daily for 5 days 10 tablet 0    nitroGLYCERIN (NITROSTAT) 0.3 MG SL tablet Place 1 tablet under the tongue every 5 minutes as needed for Chest pain up to max of 3 total doses. If no relief after 1 dose, call 911. 30 tablet 3    gabapentin (NEURONTIN) 100 MG capsule TAKE FIVE CAPSULES BY MOUTH DAILY 150 capsule 0     Allergies   Allergen Reactions    Demerol Other (See Comments)     Hallucinations, nausea, aka MEPERIDINE    Percocet [Oxycodone-Acetaminophen] Other (See Comments)     hallucinations    Procardia [Nifedipine] Rash       REVIEW OF SYSTEMS  10 systems reviewed, pertinent positives per HPI otherwise noted to be negative. PHYSICAL EXAM  /78   Pulse 70   Temp 98.3 °F (36.8 °C) (Oral)   Resp 18 Comment: 98% RA  Ht 5' 2\" (1.575 m)   Wt 202 lb (91.6 kg)   LMP 08/07/2017 Comment: hyst 08/2017  SpO2 98%   BMI 36.95 kg/m²    Physical exam:  General appearance: awake and cooperative. Moderate pain distress. Non toxic appearing. Skin: Warm and dry. No rashes. There is postsurgical wound with 3 sutures and overlying skin glue in place, wound is well approximated, there is no underlying fluid collection to the suture, there is some ecchymosis inferior to the suture, with some surrounding soft tissue swelling and erythema. There is no crepitus. There is an open blister as pictured above. HENT: Normocephalic. Atraumatic. Mucus membranes are moist  Neck: supple  Eyes: ERIN. EOM intact. Heart: RRR. No murmurs. Lungs: Respirations unlabored. CTAB. No wheezes, rales, or rhonchi. Good air exchange  Abdomen: No tenderness. Soft. Non distended. No peritoneal signs. Musculoskeletal: No extremity edema. Compartments soft. No deformity. No tenderness in the extremities. All extremities neurovascularly intact. Radial, Dp, and PT pulses +2/4 bilaterally  Neurological: Alert and oriented. No focal deficits. No aphasia or dysarthria. No gait ataxia. Psychiatric: Normal mood and affect. LABS  I have reviewed all labs for this visit.    No results found for this visit on 05/15/21. ECG      RADIOLOGY      ED COURSE/MDM  Patient seen and evaluated. Old records reviewed. Labs and imaging reviewed and results discussed with patient. This is a 65-year-old female presenting with left breast pain after her loop recorder was removed yesterday. Her vital signs are within normal limits, she is afebrile not tachycardic. She does have evidence of a cellulitis that is developed to her left breast.  The surgical site is well approximated with sutures and skin glue. She has some ecchymosis, could be related to trauma, also could be infectious and therefore she was told to continue the doxycycline that was prescribed by [facility]. She denies any chest pain or shortness of breath, do not feel repeat imaging with chest x-ray is indicated at this time. I did perform bedside ultrasound which showed no evidence of fluid collection or drainable abscess. The patient's pain was treated here, she states she does feel improved. The wound was redressed here. I do not have ability for formal ultrasound, and a CT of the chest would likely not be high yield or add to the clinical picture at this time. I discussed this with the patient and she is agreeable to the plan. She is told to follow-up with electrophysiology as well as primary care for wound recheck. She is also given follow-up for breast surgery as well. She will be given a prescription for Toradol for home. I did discuss strict return precautions with her, she voices understanding.     Point of care limited soft tissue/musculoskeletal exam  Procedure note:  Indication: swelling, redness, pain    Billable study: yes    Views:  Skin and subcutaneous tissue: Adequate    Images obtained with findings:   *Tissue thickening: Present  Cobblestoning: Present  Subcutaneous fluid collection: Absent   *Foreign body: Absent     Impression:   Sonographic evidence of cellulitis: Present  Sonographic evidence of abscess:  Foreign body: Absent

## 2021-05-17 NOTE — ED PROVIDER NOTES
[facility] Emergency Department    CHIEF COMPLAINT  Chest Pain (patient with loop recorder in for about 5 hrs, removed friday. Started having left breast pain, since removal. Patient seen in ER's twice with toradol prescribed, no pain relief per patient. Blister on left breast from tegaderm removal. )      SHARED SERVICE VISIT  Evaluated by MERLYN. My supervising physician was available for consultation. EKG interpretation provided by attending physician. HISTORY OF PRESENT ILLNESS  Akanksha Kamara is a 31 y.o. female who presents to the ED complaining of several day history of left breast pain. Patient dropped off by  today for evaluation. Patient reports that she had loop recorder removed 3 days ago. States that did require sedation as it had been in place for approximately 5 years. States that she has had pain since worse with movement. Chronic shortness of breath which appears unchanged. She denies any cough or congestion. No fevers chills. No nausea or vomiting. Seen and evaluated recently and discharged on NSAIDs and Tylenol without improvement of pain. Currently rated 8 out of 10. No other complaints, modifying factors or associated symptoms. Nursing notes reviewed.    Past Medical History:   Diagnosis Date    Anemia     Currently on Iron PO    Anxiety     Heart abnormalities     \"heart flutters real fast\"    Miscarriage 2009    Ovarian cyst     Tricuspid regurgitation      Past Surgical History:   Procedure Laterality Date    APPENDECTOMY  2006    CARDIAC CATHETERIZATION  05/07/2021    WNL    ENDOMETRIAL ABLATION  02/01/2017    With Tubal ligation    HYSTERECTOMY      INSERTABLE CARDIAC MONITOR  05/24/2017    Loop Recorder Left Chest    OVARIAN CYST SURGERY       Family History   Problem Relation Age of Onset    Cancer Maternal Grandmother         Thyroid    Cancer Paternal Grandmother     Arthritis Mother     Mental Retardation Maternal Uncle      Social History     Socioeconomic History    Marital status:      Spouse name: Not on file    Number of children: Not on file    Years of education: Not on file    Highest education level: Not on file   Occupational History    Not on file   Tobacco Use    Smoking status: Never Smoker    Smokeless tobacco: Never Used   Vaping Use    Vaping Use: Never used   Substance and Sexual Activity    Alcohol use: No    Drug use: No    Sexual activity: Yes     Partners: Male   Other Topics Concern    Not on file   Social History Narrative    Not on file     Social Determinants of Health     Financial Resource Strain:     Difficulty of Paying Living Expenses:    Food Insecurity:     Worried About Running Out of Food in the Last Year:     [address] in the Last Year:    Transportation Needs:     Lack of Transportation (Medical):      Lack of Transportation (Non-Medical):    Physical Activity:     Days of Exercise per Week:     Minutes of Exercise per Session:    Stress:     Feeling of Stress :    Social Connections:     Frequency of Communication with Friends and Family:     Frequency of Social Gatherings with Friends and Family:     Attends [employer]ism Services:     Active Member of Clubs or Organizations:     Attends Club or Organization Meetings:     Marital Status:    Intimate Partner Violence:     Fear of Current or Ex-Partner:     Emotionally Abused:     Physically Abused:     Sexually Abused:      Current Facility-Administered Medications   Medication Dose Route Frequency Provider Last Rate Last Admin    morphine (PF) injection 4 mg  4 mg Intravenous Once [place], [place]        ondansetron [facility]) injection 4 mg  4 mg Intravenous Once [place], [place]         Current Outpatient Medications   Medication Sig Dispense Refill    acetaminophen (TYLENOL) 500 MG tablet Take 500 mg by mouth every 6 hours as needed for Pain      ketorolac (TORADOL) 10 MG tablet Take 1 tablet No masses. No organomegaly. EXTREMITIES: No peripheral edema. Moves all extremities equally. All extremities neurovascularly intact. SKIN: Warm and dry. No acute rashes. NEUROLOGICAL: Alert and oriented. CN's 2-12 intact. No gross facial drooping. Strength 5/5, sensation intact. PSYCHIATRIC: Normal mood and affect. RADIOLOGY  XR CHEST PORTABLE    Result Date: 5/17/2021  EXAMINATION: ONE XRAY VIEW OF THE CHEST 5/17/2021 12:53 pm COMPARISON: Chest x-ray dated 03/02/2021 HISTORY: ORDERING SYSTEM PROVIDED HISTORY: cp TECHNOLOGIST PROVIDED HISTORY: Reason for exam:->cp Reason for Exam: Left sided chest pains FINDINGS: HEART/MEDIASTINUM: The cardiomediastinal silhouette is within normal limits. PLEURA/LUNGS: There are no focal consolidations or pleural effusions. There is no appreciable pneumothorax. BONES/SOFT TISSUE: No acute abnormality. No radiographic evidence of acute pulmonary disease. CT CHEST PULMONARY EMBOLISM W CONTRAST    Result Date: 5/17/2021  EXAMINATION: CTA OF THE CHEST 5/17/2021 2:36 pm TECHNIQUE: CTA of the chest was performed after the administration of intravenous contrast.  Multiplanar reformatted images are provided for review. MIP images are provided for review. Dose modulation, iterative reconstruction, and/or weight based adjustment of the mA/kV was utilized to reduce the radiation dose to as low as reasonably achievable. COMPARISON: None. HISTORY: ORDERING SYSTEM PROVIDED HISTORY: cp-hysterectomy TECHNOLOGIST PROVIDED HISTORY: Reason for exam:->cp-hysterectomy Decision Support Exception - unselect if not a suspected or confirmed emergency medical condition->Emergency Medical Condition (MA) Reason for Exam: loop recorder removal on Fri, ongoing chest pain since, SOB; no hx of blood clots Acuity: Acute Type of Exam: Initial FINDINGS: Pulmonary Arteries: Pulmonary arteries are adequately opacified for evaluation.   No evidence of intraluminal filling defect to suggest pulmonary embolism. Main pulmonary artery is normal in caliber. Mediastinum: No evidence of mediastinal lymphadenopathy. The heart and pericardium demonstrate no acute abnormality. There is no acute abnormality of the thoracic aorta. Lungs/pleura: The lungs are without acute process. No focal consolidation or pulmonary edema. No evidence of pleural effusion or pneumothorax. Upper Abdomen: Limited images of the upper abdomen are unremarkable. Soft Tissues/Bones: No acute bone or soft tissue abnormality. No evidence of pulmonary embolism or acute pulmonary abnormality. NM Cardiac Stress Test Nuclear Imaging    Result Date: 5/5/2021  Cardiac Perfusion Imaging  Demographics   Patient Name       Judy Getting   Date of Study      05/05/2021         Gender              Female   Patient Number     [id number]         Date of Birth       1990   Visit Number       [id number]          Age                 31 year(s)   Accession Number   [id number]         Room Number   Corporate ID       [id number]             NM Technician       Hailey Montenegro,                                                            [address] [facility]   Nurse              Juan Granado, CHANDLER   Interpreting        Saji Puente RN Physician           Imtiaz Bettencourt MD   Ordering Physician Todd Boateng MD   The procedure was explained in detail to the patient. Risks,  complications and alternative treatments were reviewed. Written consent  was obtained. Procedure Procedure Type:   Nuclear Stress Test:Exercise, NM MYOCARDIAL SPECT REST EXERCISE OR RX   Study location: [facility] - Nuclear Medicine   Indications: Chest pain and Syncope. Hospital Status: Outpatient. Height: 62 inches Weight: 211 pounds  Risk Factors   The patient risk factors include:obesity.    Conclusions   Summary  There is a large area, moderate to severe, reversible perfusion defect  involving the anterior/anterolateral wall. FIndings are consistent with  inducible ischemia of the LAD territory. Hyperdynamic LV function with LVEF  > 70%. Overall findings represent a high risk scan. Stress Protocols   Resting ECG  Normal sinus rhythm, normal ECG. Resting HR:85 bpm  Resting BP:112/76 mmHg  Stress Protocol:Exercise - Juan M  Peak HR:173 bpm                             HR response: Normal  Peak BP:161/69 mmHg                         BP response: Normal  Predicted HR: 189 bpm                       HR/BP product:01659  % of predicted HR: 92                       Max exercise: 7 METS  Test duration:5 min and 30 sec  Reason for termination:Target heart rate    Exercise effort:Fair  Perceived exertion:4   ECG Findings  Sinus tachycardia with no significant ST-T wave changes. Arrhythmias  None. Symptoms  There was stress induced dizziness and fatigue. Complications  Procedure complication was none. Stress Interpretation  The patient exercised for 5 minutes 30 seconds on the Juan M protocol,  attaining 92% max-predicted HR and 7 METS. Fair exercise capacity. Stress induced dizziness and fatigue endorsed. No Chest pain reported. Normal BP response to exercise. On review of perfusion images, there is a large area, moderate to  severe intensity, reversible perfusion defect involving the  anterior/anterolateral wall. The defect persists despite attenuation  correction. FIndings are consistent with inducible ischemia of the LAD  territory. No TID. Hyperdynamic LV function with LVEF > 70%. Imaging Protocols   - One Day   Rest                          Stress   Isotope:Myoview/Tetrofosmin   Isotope: Myoview/Tetrofosmin  Isotope dose:10.2 mCi         Isotope dose:32.8 mCi  Administration Route:I.V.      Administration Route:I.V.  Date:05/05/2021 09:48         Date:05/05/2021 11:10                                 Technique:      Gated  Imaging Results   Applied corrections   - Attenuation correction  applied Stress ejection    Ejection fraction:73 %    EDV :70 ml    ESV :19 ml    Stroke volume :51 ml    LV mass :112 gr  Medical History   Additional Medical History   implanted cardiac monitor 2017   Signatures   ------------------------------------------------------------------  Electronically signed by Osei Reeder MD (Interpreting  physician) on 05/05/2021 at 13:21  ------------------------------------------------------------------      ED COURSE  Patient received morphine and Zofran IV for pain, with good relief. Triage vitals stable. CBC without leukocytosis or anemia. CMP was unremarkable. Troponin less than 0.01. Stable portable chest x-ray. EKG was a normal sinus rhythm without evidence for acute ischemia. Patient reports that morphine did initially help pain although it is reoccurring. She was given dose of Toradol with improvement of symptoms. CT of the chest without evidence to suggest postoperative complications, hematomas, and abscesses as well as no evidence to suggest pulmonary embolus or pneumothorax. Patient is feeling better on reevaluation. Will discharge home with pain medication. Have discussed return precautions as well as recommendations for follow-up otherwise and patient is in agreement and comfortable at discharge. A discussion was had with Ms. Benson Avila regarding chest wall pain status post loop recorder removal, ED findings, recommendations for follow-up. Risk management discussed and shared decision making had with patient and/or surrogate. All questions were answered. Patient will follow up with cardiology within 1 to 2 days for further evaluation/treatment. All questions answered. Patient will return to ED for new/worsening symptoms. Patient was sent home with a prescription for Percocet.     MDM  Results for orders placed or performed during the hospital encounter of 05/17/21   CBC auto differential   Result Value Ref Range    WBC 5.9 4.0 - 11.0 K/uL    RBC 4.31 4.00 - 5.20 M/uL    Hemoglobin 13.3 12.0 - 16.0 g/dL    Hematocrit 39.9 36.0 - 48.0 %    MCV 92.6 80.0 - 100.0 fL    MCH 30.9 26.0 - 34.0 pg    MCHC 33.4 31.0 - 36.0 g/dL    RDW 13.0 12.4 - 15.4 %    Platelets 807 521 - 259 K/uL    MPV 8.4 5.0 - 10.5 fL    Neutrophils % 59.9 %    Lymphocytes % 31.9 %    Monocytes % 6.2 %    Eosinophils % 1.1 %    Basophils % 0.9 %    Neutrophils Absolute 3.5 1.7 - 7.7 K/uL    Lymphocytes Absolute 1.9 1.0 - 5.1 K/uL    Monocytes Absolute 0.4 0.0 - 1.3 K/uL    Eosinophils Absolute 0.1 0.0 - 0.6 K/uL    Basophils Absolute 0.1 0.0 - 0.2 K/uL   Comprehensive metabolic panel   Result Value Ref Range    Sodium 141 136 - 145 mmol/L    Potassium 4.1 3.5 - 5.1 mmol/L    Chloride 104 99 - 110 mmol/L    CO2 27 21 - 32 mmol/L    Anion Gap 10 3 - 16    Glucose 97 70 - 99 mg/dL    BUN 21 (H) 7 - 20 mg/dL    CREATININE 0.8 0.6 - 1.1 mg/dL    GFR Non-African American >60 >60    GFR African American >60 >60    Calcium 9.8 8.3 - 10.6 mg/dL    Total Protein 7.3 6.4 - 8.2 g/dL    Albumin 4.1 3.4 - 5.0 g/dL    Albumin/Globulin Ratio 1.3 1.1 - 2.2    Total Bilirubin 0.3 0.0 - 1.0 mg/dL    Alkaline Phosphatase 82 40 - 129 U/L    ALT 39 10 - 40 U/L    AST 31 15 - 37 U/L    Globulin 3.2 g/dL   Troponin   Result Value Ref Range    Troponin <0.01 <0.01 ng/mL   EKG 12 Lead   Result Value Ref Range    Ventricular Rate 62 BPM    Atrial Rate 62 BPM    P-R Interval 170 ms    QRS Duration 82 ms    Q-T Interval 396 ms    QTc Calculation (Bazett) 401 ms    P Axis 54 degrees    R Axis 51 degrees    T Axis 62 degrees    Diagnosis       Normal sinus rhythmNormal ECGWhen compared with ECG of 14-MAY-2021 10:58,No significant change was foundConfirmed by Constance Hong ([id number]) on 5/17/2021 3:37:37 PM     I estimate there is LOW risk for ACUTE CORONARY SYNDROME, INTRACRANIAL HEMORRHAGE, MALIGNANT DYSRHYTHMIA or HYPERTENSION, PULMONARY EMBOLISM, SEPSIS, SUBARACHNOID HEMORRHAGE, SUBDURAL HEMATOMA, STROKE, or THORACIC AORTIC DISSECTION, thus I consider the discharge disposition reasonable. Dennise Keating and I have discussed the diagnosis and risks, and we agree with discharging home to follow-up with their primary doctor. We also discussed returning to the Emergency Department immediately if new or worsening symptoms occur. We have discussed the symptoms which are most concerning (e.g., bloody sputum, fever, worsening pain or shortness of breath, vomiting, weakness) that necessitate immediate return. Final Impression  1. Postoperative pain      Blood pressure 121/74, pulse 68, temperature 98.3 °F (36.8 °C), temperature source Oral, resp. rate 18, height 5' 2\" (1.575 m), weight 208 lb (94.3 kg), last menstrual period 08/07/2017, SpO2 100 %, not currently breastfeeding. DISPOSITION  Patient was discharged to home in good condition.           Won Jimenez  05/17/21 2052

## 2021-05-17 NOTE — ED PROVIDER NOTES
The Ekg interpreted by me shows  normal sinus rhythm with a rate of 62  Axis is   Normal  QTc is  401  Intervals and Durations are unremarkable.       ST Segments: no acute change  No significant change from prior EKG dated 5/14/2021    Interpreted EKG, patient not evaluated        Romaine Moore MD  05/17/21 [id number]

## 2021-05-18 ENCOUNTER — TELEPHONE (OUTPATIENT)
Dept: CARDIOLOGY CLINIC | Age: 31
End: 2021-05-18

## 2021-05-18 NOTE — TELEPHONE ENCOUNTER
Pt seen in ED 5/17/2021 for pain at loop removal site. Pt told to follow soon with AGK. Should pt be added on. Please advise.

## 2021-05-18 NOTE — TELEPHONE ENCOUNTER
Spoke with patient. Can't come in on Thursday. Would prefer if you can call in medication for. [facility] is her pharmacy.

## 2021-05-19 NOTE — TELEPHONE ENCOUNTER
Controlled substances can't be called in. Has to  prescription. I will write today and leave in office for her to  when she or her  can. Thanks.

## 2021-05-24 ENCOUNTER — NURSE ONLY (OUTPATIENT)
Dept: CARDIOLOGY CLINIC | Age: 31
End: 2021-05-24

## 2021-05-24 ENCOUNTER — TELEPHONE (OUTPATIENT)
Dept: CARDIOLOGY CLINIC | Age: 31
End: 2021-05-24

## 2021-05-24 DIAGNOSIS — R00.2 PALPITATIONS: Primary | ICD-10-CM

## 2021-05-24 NOTE — TELEPHONE ENCOUNTER
Site check requested. Patient presents with ongoing left breast pain. Two wounds were visualized:   #1: left upper breast loop explant wound appears healing and non-infected. Has surgical glue covering site and no drainage, redness or warmth is noted    #2: left lower breat wound: reported as occurring after Tegaderm was removed in ER. Patient's pictures show wound/bruising is healing. Wound remains open and tender. No obvious drainage, redness, or warmth is noted. A Mepilex dressing was placed over #2 wound. She was instructed to not shower/get dressing wet and return to office on Thursday for Dr. Cely Espinoza to review. She was also instructed to check temperature at home and call if febrile. Will prescribe clindamycin if needed however there is no current indication for another course of antibiotics. She was instructed to continue use of tramadol and she reports she has half of a bottle remaining. She also wanted Dr. Cely Espinoza to know her left arm 'feels like menthol after washing hands'. She reports this has been occurring since tilt table test. Will copy this note and send to Dr. Cely Espinoza in separate encounter.

## 2021-05-24 NOTE — TELEPHONE ENCOUNTER
Patient came in for site check today. Coming back in on Thursday for another site check while AGK in office. Patient is taking her doxycycline as prescribed by ED. Patient is still in a lot of pain. She states it is a \"deep\" pain. She reported the lortab and toradol she was prescribed had been very helpful, however, she is out. What are your recommendations for this patient?

## 2021-05-24 NOTE — PROGRESS NOTES
Checked patient's site one (superior) is dry and intact with dermabond in place. Site two (inferior) blistered, but intact. Patient currently taking doxycycline as prescribed by ED. Due to patient having to reschedule time of site check appointment. [address] unavailable. [facility] checked site, will provide patient with mepilex and have patient come back in on Thursday 5/27/2021 for site check. Please see [facility] documentation for more detail.

## 2021-05-24 NOTE — TELEPHONE ENCOUNTER
Pt called into office inquiring about the status of disability paperwork and that Chelle Woodall has not received it. Informed pt that Cristin SORENSEN is out of the office today and that I would leave a note for Cristin to call pt back sometime tomorrow regarding this matter as she is the one handling [facility] [address] [facility] [address] [facility] at this [facility].   Pt verbalized understanding of relaying message to [place], [place].

## 2021-05-27 ENCOUNTER — OFFICE VISIT (OUTPATIENT)
Dept: CARDIOLOGY CLINIC | Age: 31
End: 2021-05-27
Payer: COMMERCIAL

## 2021-05-27 VITALS
DIASTOLIC BLOOD PRESSURE: 70 MMHG | HEART RATE: 70 BPM | OXYGEN SATURATION: 94 % | WEIGHT: 208 LBS | SYSTOLIC BLOOD PRESSURE: 110 MMHG | HEIGHT: 62 IN | BODY MASS INDEX: 38.28 KG/M2

## 2021-05-27 DIAGNOSIS — B99.9 INFECTION: ICD-10-CM

## 2021-05-27 DIAGNOSIS — R00.0 TACHYCARDIA: Primary | ICD-10-CM

## 2021-05-27 DIAGNOSIS — R55 SYNCOPE AND COLLAPSE: ICD-10-CM

## 2021-05-27 DIAGNOSIS — N64.4 BREAST PAIN: ICD-10-CM

## 2021-05-27 PROCEDURE — 99214 OFFICE O/P EST MOD 30 MIN: CPT | Performed by: INTERNAL MEDICINE

## 2021-05-27 RX ORDER — HYDROCODONE BITARTRATE AND ACETAMINOPHEN 5; 325 MG/1; MG/1
1 TABLET ORAL EVERY 6 HOURS PRN
Qty: 21 TABLET | Refills: 0 | Status: SHIPPED | OUTPATIENT
Start: 2021-05-27 | End: 2021-06-03

## 2021-05-27 RX ORDER — DOXYCYCLINE HYCLATE 100 MG
100 TABLET ORAL 2 TIMES DAILY
Qty: 10 TABLET | Refills: 0 | Status: SHIPPED | OUTPATIENT
Start: 2021-05-27 | End: 2021-06-01

## 2021-05-27 NOTE — PROGRESS NOTES
days which did not help her syncopal episodes either. Patient denies current SOB. Since her last visit, she wore a AVTAR from 3/16-3/26/2021 which showed an average heart rate of 86, lowest 58, highest 168 bpm, sinus rhythm with tachycardia, very rare PAC's, and PVC's. at her last visit, she was referred to neurology. Today 4/13/2021, she she states she had bot been feeling well and has had 4-5 episodes of almost passing out. With one spell she was outside with her sister in law and was helping with landscaping. She started to feel strange and her sister in law voice sounded like in was at a distance. She had to lay on the ground in order not to pas out. 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 During this spell she had chest pain that radiated into her arms. She has been staying well hydrated but is also trying to loose weight. She states her weight has gone up 4 lbs (207-211) and has foot swelling. She reports having issues with low heart rates in the past on beta blockers. She will be seeing neurology this month. Interval history since last office visit: Patient underwent a stress test 5/5/2021 that showed ischemia. She underwent a LHC 5/7/2021 that did not show any evidence of CAD. 5/14/2021, patient underwent a tilt table test study (normal physiologic response) and ILR removal 5/27/2021. ILR removal was complicated by ILR migrating to under fibrous breat tissues. Patient required sedation to aid with removal. Cellulitis post operatively requiring antibiotics and frequent post op site checks. Today, 5/27/2021, patient's ECG demonstrated SR 65 BPM. She reports her ILR site is still painful. She describes the pain as \"very deep\". She has tried heat and ice for pain relief but this has not been helpful. It prevents her from sleeping. She reports the tramadol is not helping with pain control.  She reports she received a referral for a breast by mouth every 8 hours as needed for Pain 5/15/21 5/15/22 Yes Park Ortega,    propranolol (INDERAL LA) 60 MG extended release capsule Take 1 capsule by mouth daily 5/14/21 6/13/21 Yes MICHAEL Cotto MD   nitroGLYCERIN (NITROSTAT) 0.3 MG SL tablet Place 1 tablet under the tongue every 5 minutes as needed for Chest pain up to max of 3 total doses. If no relief after 1 dose, call 911. 5/14/21  Yes Thomas Cheng MD   gabapentin (NEURONTIN) 100 MG capsule TAKE FIVE CAPSULES BY MOUTH DAILY 4/30/21 5/28/21 Yes Deja Oconnor MD       REVIEW OF SYSTEMS:    All 14-point review of systems are completed and  pertinent positives are mentioned in the history of present illness. Other  systems are reviewed and are negative. Physical Examination:    /70   Pulse 70   Ht 5' 2\" (1.575 m)   Wt 208 lb (94.3 kg)   LMP 08/07/2017 Comment: hyst 08/2017  SpO2 94%   BMI 38.04 kg/m²      Constitutional and General Appearance:    alert, cooperative, no distress and appears stated age  [de-identified]:    PERRLA, no cervical lymphadenopathy. No masses palpable. Normal oral mucosa  Respiratory:  · Normal excursion and expansion without use of accessory muscles  · Resp Auscultation: Normal breath sounds without dullness or wheezing  Cardiovascular:  · The apical impulse is not displaced  · RRR S1S2 w/o M/G/R  Abdomen:  · No masses or tenderness  · Bowel sounds present  Extremities:  ·  No Cyanosis or Clubbing  ·  Lower extremity edema: No  · Skin: Warm and dry  Neurological:  · Alert and oriented. · Moves all extremities well  · No abnormalities of mood, affect, memory, mentation, or behavior are noted    DATA:      ECG 5/27/21: Personally reviewed. IMPRESSION:    1. Syncope/presyncope.  03/16/2021  Patient is a pleasant 27-year-old female with a medical history significant for fibromyalgia, sinus tachycardia, and syncope/presyncope.   She states that since the last time she saw us in 2018 she has been doing well until over the last 3 months when she began to suffer from recurrent symptoms. She previously was on metoprolol however discontinued as her symptoms improved in the past.  I am unclear as to what her symptoms are related to from a rhythm standpoint as her device shows no arrhythmias. I will asked that she wear a 30-day monitor and follow-up with us in clinic. I also asked that she follow-up with neurology as far as her migraines are concerned as this may be the true etiology of her symptoms.  - Thirty day monitor.  - Follow up with neurology. - Limited echocardiogram for edema.  - Follow up with me in a month. 04/13/2021  Patient underwent echocardiogram which showed normal cardiac function. Her monitor showed rare PACs and PVCs that were not symptomatic. No significant arrhythmias noted on her monitor. Patient did have presyncope and syncopal events. None of these occurred while she was wearing her heart monitor. From a symptom standpoint appears to be vagal in nature. I will asked that she repeat a tilt table test.  Given her chest pain I will asked that she have a stress test as well as she is post hysterectomy with bilateral oophorectomies.  -Schedule exercise stress test.  - Schedule tilt table testing.  - Continue current course but consider fludrocortisone or midodrine after tilt table test.    5/27/2021  Patient is here for follow-up post loop monitor explantation. She continues to have some pain around her explantation site and just below where blister was unintentionally ruptured. We will give her some more pain medication some antibiotics to help with this. Will refer her to plastic surgery for evaluation. We will switch her from propranolol to verapamil for possible spasms. Does have some wrist discomfort bilaterally that is getting better post radial arterial cannulation. We will need to continue to monitor this. I will asked her to follow-up with me in 3 to 4 weeks.   - Start doxycycline 100 mg BID. - Stop propanolol and switch to verapamil for spasms and palpitations. - Referral to breast surgery. - Follow up with me in 3-4 weeks. 2. Migraines/altered mental state. Etiology unclear. - Plan as per above. 3. Chest pain     4. Peripheral edema     5. Dizziness  4/13/2021  Kierra Knight is here today for follow up for syncope/presyncope. AVTAR from 3/2021 showed sinus rhythm with tachycardia, very rare PAC's, and PVC's. At her last visit she was referred to neurology. She has had 4-5 episodes of near syncope in the last 2 weeks. Last episode she had chest pain that radiated into her arms. She has a history ovary removal and then her uterus a few years later due to endometriosis. She would like to have her ILR removed. She is of work until 4/22/2021 due to her symptoms.   - Plan as per above. 5/27/2021  - Plan as per above. RECOMMENDATIONS:  1. Recommend breast surgeon referral: Clara Rico MD.   2. 5 days of antibiotics: doxycycline 100mg twice daily   3. Verapamil 120mg daily. 4. Follow up with me 3-4 weeks. QUALITY MEASURES  1. Tobacco Cessation Counseling: NA  2. Retake of BP if >140/90:   NA  3. Documentation to PCP/referring for new patient:  Sent to PCP at close of office visit  4. CAD patient on anti-platelet: NA  5. CAD patient on STATIN therapy:  NA  6. Patient with CHF and aFib on anticoagulation:  NA     All questions and concerns were addressed to the patient/family. Alternatives to my treatment were discussed. This note has been scribed in the presence of Richard Jama MD by Kendra Crain RN. The scribe's documentation has been prepared under my direction and personally reviewed by me in its entirety. I confirm that the note above accurately reflects all work, physical examination, the discussion of treatments and procedures, and medical decision making performed by me.       Rama Manjarrez MD personally performed the services described in this documentation as scribed by nurse in my presence, and is both accurate and complete. Electronically signed by Jeremiah Almaguer MD on 5/27/2021 at 10:38 AM     Dr. Kendrick Wisdom MD  Electrophysiology  [place] [address]. [address] [place] [address]. [address]. [place] [address]  Phone: [phone]  Fax: [phone]     NOTE: This report was transcribed using voice recognition software. Every effort was made to ensure accuracy, however, inadvertent computerized transcription errors may be present.

## 2021-05-27 NOTE — PATIENT INSTRUCTIONS
RECOMMENDATIONS:  1. Recommend breast surgeon referral: Misti Reyes MD.   2. 5 days of antibiotics: doxycycline 100mg twice daily   3. Verapamil 120mg daily. 4. Follow up with me 3-4 weeks.

## 2021-05-27 NOTE — LETTER
[address]   Electrophysiology Consult Note              Date: 5/27/21  Patient Name: Roderick Gonzalez  YOB: 1990    Primary Care Physician: Ariana Paul MD    CHIEF COMPLAINT:   Chief Complaint   Patient presents with    Follow-up     Device explanted, infection followed. .Pt reports that she is still having pain at explant site    Loss of Consciousness     HISTORY OF PRESENT ILLNESS: Roderick Gonzalez is a 31 y.o. female with a PMH of previous syncope/tachycardia s/p ILR 5/24/2017, chronic migraine headache, fibromyalgia, adenomyosis status post hysterectomy and salpingo-oophorectomy who presented to the ED on 3/2/2021 with concerns of fatigue. She reports she was told all her testing was normal and returned to work. She reports she felt very tired and was experiencing palpitations with associated chest pain. She reports she did not remember anything that happened after that, but she was told she was laying on the floor and had missed 12 work phone calls. On 3/16/2021, her ECG demonstrates SR 84 BPM. She reports she does not have much notice prior to her syncopal episodes. She reported that her syncopal episodes are happening more frequently. She does notice she usually has HA prior to episodes and then she will experience tunnel vision prior to losing consciousness. She reports that if she does feel it coming on, she lays down and tried to rest, which typically helps resolve the issue. She reports she has seen a neurologist in the past for syncope but does not recall what what said. She reports that she experiences edema in her extremities and around her eyes. She reports she was unable to metoprolol due to fatigue and bradycardia. She reports she has been taking gabapentin for about 8 months. She reports she stopped taking it a few days ago to see if this would make a difference, and her syncopal episodes have persisted.  She reports she tried to stop her premarin for several surgeon from the ED, but is unsure if she should make an appointment. She reports she has been waking up in the middle of the night with chest \"spasms\" and took her pulse while she was still laying down and her HR was 36 BPM. This resolved with activity. She reports that she feels her palpitations when her HR is very low or very high. She reports that she gets SOB with exertion when she walks more than 20 feet at a time. She reports she has been experiencing numbness, coldness and tingling  in both hands. he reports she is taking all medications as prescribed and tolerates them well. She denies any signs or symptoms of infection, including fever or chills. Patient denies current edema, dizziness or syncope. Past Medical History:   has a past medical history of Anemia, Anxiety, Heart abnormalities, Miscarriage, Ovarian cyst, and Tricuspid regurgitation. Past Surgical History:   has a past surgical history that includes Appendectomy (2006); Ovarian cyst surgery; Endometrial ablation (02/01/2017); Insertable Cardiac Monitor (05/24/2017); Hysterectomy; and Cardiac catheterization (05/07/2021). Allergies:  Demerol and Procardia [nifedipine]    Social History:   reports that she has never smoked. She has never used smokeless tobacco. She reports that she does not drink alcohol and does not use drugs. Family History: family history includes Arthritis in her mother; Cancer in her maternal grandmother and paternal grandmother; Mental Retardation in her maternal uncle. Home Medications:    Prior to Admission medications    Medication Sig Start Date End Date Taking?  Authorizing Provider   ondansetron (ZOFRAN ODT) 4 MG disintegrating tablet Take 1 tablet by mouth every 8 hours as needed for Nausea or Vomiting 5/17/21  Yes WALTER Jimenez   acetaminophen (TYLENOL) 500 MG tablet Take 500 mg by mouth every 6 hours as needed for Pain   Yes Historical Provider, MD   ketorolac (TORADOL) 10 MG tablet Take 1 tablet by mouth every 8 hours as needed for Pain 5/15/21 5/15/22 Yes Park Ortega,    propranolol (INDERAL LA) 60 MG extended release capsule Take 1 capsule by mouth daily 5/14/21 6/13/21 Yes MICHAEL Kinney MD   nitroGLYCERIN (NITROSTAT) 0.3 MG SL tablet Place 1 tablet under the tongue every 5 minutes as needed for Chest pain up to max of 3 total doses. If no relief after 1 dose, call 911. 5/14/21  Yes Latrice Marlow MD   gabapentin (NEURONTIN) 100 MG capsule TAKE FIVE CAPSULES BY MOUTH DAILY 4/30/21 5/28/21 Yes Lui Elizabeth MD       REVIEW OF SYSTEMS:    All 14-point review of systems are completed and  pertinent positives are mentioned in the history of present illness. Other  systems are reviewed and are negative. Physical Examination:    /70   Pulse 70   Ht 5' 2\" (1.575 m)   Wt 208 lb (94.3 kg)   LMP 08/07/2017 Comment: hyst 08/2017  SpO2 94%   BMI 38.04 kg/m²      Constitutional and General Appearance:    alert, cooperative, no distress and appears stated age  [de-identified]:    PERRLA, no cervical lymphadenopathy. No masses palpable. Normal oral mucosa  Respiratory:  · Normal excursion and expansion without use of accessory muscles  · Resp Auscultation: Normal breath sounds without dullness or wheezing  Cardiovascular:  · The apical impulse is not displaced  · RRR S1S2 w/o M/G/R  Abdomen:  · No masses or tenderness  · Bowel sounds present  Extremities:  ·  No Cyanosis or Clubbing  ·  Lower extremity edema: No  · Skin: Warm and dry  Neurological:  · Alert and oriented. · Moves all extremities well  · No abnormalities of mood, affect, memory, mentation, or behavior are noted    DATA:      ECG 5/27/21: Personally reviewed. IMPRESSION:    1. Syncope/presyncope.  03/16/2021  Patient is a pleasant 43-year-old female with a medical history significant for fibromyalgia, sinus tachycardia, and syncope/presyncope.   She states that since the last time she saw us in 2018 she has been doing well until over the last 3 months when she began to suffer from recurrent symptoms. She previously was on metoprolol however discontinued as her symptoms improved in the past.  I am unclear as to what her symptoms are related to from a rhythm standpoint as her device shows no arrhythmias. I will asked that she wear a 30-day monitor and follow-up with us in clinic. I also asked that she follow-up with neurology as far as her migraines are concerned as this may be the true etiology of her symptoms.  - Thirty day monitor.  - Follow up with neurology. - Limited echocardiogram for edema.  - Follow up with me in a month. 04/13/2021  Patient underwent echocardiogram which showed normal cardiac function. Her monitor showed rare PACs and PVCs that were not symptomatic. No significant arrhythmias noted on her monitor. Patient did have presyncope and syncopal events. None of these occurred while she was wearing her heart monitor. From a symptom standpoint appears to be vagal in nature. I will asked that she repeat a tilt table test.  Given her chest pain I will asked that she have a stress test as well as she is post hysterectomy with bilateral oophorectomies.  -Schedule exercise stress test.  - Schedule tilt table testing.  - Continue current course but consider fludrocortisone or midodrine after tilt table test.    5/27/2021  Patient is here for follow-up post loop monitor explantation. She continues to have some pain around her explantation site and just below where blister was unintentionally ruptured. We will give her some more pain medication some antibiotics to help with this. Will refer her to plastic surgery for evaluation. We will switch her from propranolol to verapamil for possible spasms. Does have some wrist discomfort bilaterally that is getting better post radial arterial cannulation. We will need to continue to monitor this. I will asked her to follow-up with me in 3 to 4 weeks.   - Start doxycycline 100 mg BID. - Stop propanolol and switch to verapamil for spasms and palpitations. - Referral to breast surgery. - Follow up with me in 3-4 weeks. 2. Migraines/altered mental state. Etiology unclear. - Plan as per above. 3. Chest pain     4. Peripheral edema     5. Dizziness  4/13/2021  Emanuel Moran is here today for follow up for syncope/presyncope. AVTAR from 3/2021 showed sinus rhythm with tachycardia, very rare PAC's, and PVC's. At her last visit she was referred to neurology. She has had 4-5 episodes of near syncope in the last 2 weeks. Last episode she had chest pain that radiated into her arms. She has a history ovary removal and then her uterus a few years later due to endometriosis. She would like to have her ILR removed. She is of work until 4/22/2021 due to her symptoms.   - Plan as per above. 5/27/2021  - Plan as per above. RECOMMENDATIONS:  1. Recommend breast surgeon referral: Olamide Rae MD.   2. 5 days of antibiotics: doxycycline 100mg twice daily   3. Verapamil 120mg daily. 4. Follow up with me 3-4 weeks. QUALITY MEASURES  1. Tobacco Cessation Counseling: NA  2. Retake of BP if >140/90:   NA  3. Documentation to PCP/referring for new patient:  Sent to PCP at close of office visit  4. CAD patient on anti-platelet: NA  5. CAD patient on STATIN therapy:  NA  6. Patient with CHF and aFib on anticoagulation:  NA     All questions and concerns were addressed to the patient/family. Alternatives to my treatment were discussed. This note has been scribed in the presence of Zeke Jordan MD by Christiano Silverman RN. The scribe's documentation has been prepared under my direction and personally reviewed by me in its entirety. I confirm that the note above accurately reflects all work, physical examination, the discussion of treatments and procedures, and medical decision making performed by me.       Carlitos Garcia MD personally performed the services described in this documentation as scribed by nurse in my presence, and is both accurate and complete. Electronically signed by Thomas Blount MD on 5/27/2021 at 10:38 AM     Dr. Aydee Meléndez MD  Electrophysiology  [facility]. [facility]. [address]. [place], [address]  Phone: [phone]  Fax: [phone]     NOTE: This report was transcribed using voice recognition software. Every effort was made to ensure accuracy, however, inadvertent computerized transcription errors may be present.

## 2021-06-01 ENCOUNTER — OFFICE VISIT (OUTPATIENT)
Dept: CARDIOLOGY CLINIC | Age: 31
End: 2021-06-01
Payer: COMMERCIAL

## 2021-06-01 VITALS
HEART RATE: 68 BPM | DIASTOLIC BLOOD PRESSURE: 64 MMHG | HEIGHT: 62 IN | OXYGEN SATURATION: 96 % | BODY MASS INDEX: 38.37 KG/M2 | SYSTOLIC BLOOD PRESSURE: 112 MMHG | WEIGHT: 208.5 LBS

## 2021-06-01 DIAGNOSIS — R55 SYNCOPE AND COLLAPSE: Primary | ICD-10-CM

## 2021-06-01 DIAGNOSIS — F41.9 ANXIETY: ICD-10-CM

## 2021-06-01 DIAGNOSIS — M79.7 FIBROMYALGIA: ICD-10-CM

## 2021-06-01 DIAGNOSIS — E66.01 MORBID OBESITY (HCC): ICD-10-CM

## 2021-06-01 DIAGNOSIS — R07.9 CHEST PAIN, UNSPECIFIED TYPE: ICD-10-CM

## 2021-06-01 PROCEDURE — 99214 OFFICE O/P EST MOD 30 MIN: CPT | Performed by: INTERNAL MEDICINE

## 2021-06-01 RX ORDER — GABAPENTIN 100 MG/1
CAPSULE ORAL
Qty: 150 CAPSULE | Refills: 0 | Status: SHIPPED | OUTPATIENT
Start: 2021-06-01 | End: 2021-06-28 | Stop reason: SDUPTHER

## 2021-06-01 NOTE — PATIENT INSTRUCTIONS
Patient Plan:  1. Before going to a standing position, wait and count to 10.   2. Stay well hydrated   3. When these symptoms occur -sit/lay down as soon as possible  4. Tensing up and pressure in the abdominal region. Hand  maneuvers as demonstrated in office   5. Stop taking the propranolol   6. Remain on all other medications  7. Continue to follow with Dr. Rina Batista  8.  Follow up with Dr. Jn Macdonald as needed

## 2021-06-10 ENCOUNTER — TELEPHONE (OUTPATIENT)
Dept: CARDIOLOGY CLINIC | Age: 31
End: 2021-06-10

## 2021-06-10 NOTE — TELEPHONE ENCOUNTER
I called and spoke with patient to touch base regarding disability paperwork. She states [place] is still only approving her through 6/24. They are aware she is seeing agk 6/24 and said they will wait for that visit (I think) to make the decision if they will extend to 7/1/21.     All disability paperwork scanned  In.

## 2021-06-10 NOTE — TELEPHONE ENCOUNTER
I called the patient to touch base about the disability paperwork, which was discussed. She also mentioned that her HR is going up to 140-150 with minimal exertion. She is taking  120 mg of verapamil daily. She was told to stop taking propanolol by Dr. Vladislav Andrade during the  6/01/2021 OV. She just wanted to let Dr. Mandie Díaz know. She sees [address] [facility] 6/24/2021.

## 2021-06-11 ENCOUNTER — TELEPHONE (OUTPATIENT)
Dept: SURGERY | Age: 31
End: 2021-06-11

## 2021-06-11 NOTE — TELEPHONE ENCOUNTER
Called pt regarding missed appt/No Show with Dr. Eben Rodriguez. Left VM for pt to return call and reschedule.

## 2021-06-14 NOTE — TELEPHONE ENCOUNTER
Called pt regarding her missed appt with Dr. Kala Carter. Left VM for pt to return call and reschedule appt.

## 2021-06-22 NOTE — PROGRESS NOTES
[address]   Electrophysiology Consult Note            Date: 6/24/21  Patient Name: Linda Botello  YOB: 1990    Primary Care Physician: Jagdeep Everett MD    CHIEF COMPLAINT:   Chief Complaint   Patient presents with    Follow-up     3-4 weeks    Dizziness     pt reports that she feels like she is going to pass out, but has not passed out.  Other     numbness and tingling arms and legs     HISTORY OF PRESENT ILLNESS: Linda Botello is a 31 y.o. female with a PMH of previous syncope/tachycardia s/p ILR 5/24/2017, chronic migraine headache, fibromyalgia, adenomyosis status post hysterectomy and salpingo-oophorectomy who presented to the ED on 3/2/2021 with concerns of fatigue. She reports she was told all her testing was normal and returned to work. She reports she felt very tired and was experiencing palpitations with associated chest pain. She reports she did not remember anything that happened after that, but she was told she was laying on the floor and had missed 12 work phone calls. On 3/16/2021, her ECG demonstrates SR 84 BPM. She reports she does not have much notice prior to her syncopal episodes. She reported that her syncopal episodes are happening more frequently. She does notice she usually has HA prior to episodes and then she will experience tunnel vision prior to losing consciousness. She reports that if she does feel it coming on, she lays down and tried to rest, which typically helps resolve the issue. She reports she has seen a neurologist in the past for syncope but does not recall what what said. She reports that she experiences edema in her extremities and around her eyes. She reports she was unable to metoprolol due to fatigue and bradycardia. She reports she has been taking gabapentin for about 8 months. She reports she stopped taking it a few days ago to see if this would make a difference, and her syncopal episodes have persisted.  She reports she tried to stop her premarin for several days which did not help her syncopal episodes either. Patient denies current SOB. Since her last visit, she wore a AVTAR from 3/16-3/26/2021 which showed an average heart rate of 86, lowest 58, highest 168 bpm, sinus rhythm with tachycardia, very rare PAC's, and PVC's. at her last visit, she was referred to neurology. Today 4/13/2021, she she states she had bot been feeling well and has had 4-5 episodes of almost passing out. With one spell she was outside with her sister in law and was helping with landscaping. She started to feel strange and her sister in law voice sounded like in was at a distance. She had to lay on the ground in order not to pas out. 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 During this spell she had chest pain that radiated into her arms. She has been staying well hydrated but is also trying to loose weight. She states her weight has gone up 4 lbs (207-211) and has foot swelling. She reports having issues with low heart rates in the past on beta blockers. She will be seeing neurology this month. Patient underwent a stress test 5/5/2021 that showed ischemia. She underwent a LHC 5/7/2021 that did not show any evidence of CAD. On 5/14/2021, patient underwent a tilt table test study (normal physiologic response) and ILR removal 5/27/2021. ILR removal was complicated by ILR migrating to under fibrous breat tissues. Patient required sedation to aid with removal. Cellulitis post operatively requiring antibiotics and frequent post op site checks. On 5/27/2021, patient's ECG demonstrated SR 65 BPM. She reported her ILR site is still painful. She described the pain as \"very deep\". She had tried heat and ice for pain relief but this has not been helpful. It prevented her from sleeping. She reported the tramadol is not helping with pain control.  She reported she received a referral for a breast surgeon from the ED, but is unsure if she should make an appointment. She reported she had been waking up in the middle of the night with chest \"spasms\" and took her pulse while she was still laying down and her HR was 36 BPM. This resolved with activity. She reported that she feels her palpitations when her HR is very low or very high. She reported that she gets SOB with exertion when she walks more than 20 feet at a time. She reported she has been experiencing numbness, coldness and tingling  in both hands. Interval History since last office visit: Today, 6/24/21, ECG demonstrates SR (75). She presents today feeling very dizzy and feeling like her heart is racing, she appears diaphoretic. She states that she has been having these dizzy spells on and of for about a week now. She states that these episodes seem to have been exacerbated with a great deal of family stress at home. She states she has been drinking 4-7, 16 oz bottles of water per day on average. She states that she thinks her medication is helping with her chest pain and it has decreased her chest pain episodes. She states her ILR site is healing well. She states she is taking her medications as prescribed and tolerates them well. Patient denies current edema, chest pain or sob. Past Medical History:   has a past medical history of Anemia, Anxiety, Heart abnormalities, Miscarriage, Ovarian cyst, and Tricuspid regurgitation. Past Surgical History:   has a past surgical history that includes Appendectomy (2006); Ovarian cyst surgery; Endometrial ablation (02/01/2017); Insertable Cardiac Monitor (05/24/2017); Hysterectomy; Cardiac catheterization (05/07/2021); and Hysterectomy, total abdominal.     Allergies:  Demerol and Procardia [nifedipine]    Social History:   reports that she has never smoked. She has never used smokeless tobacco. She reports that she does not drink alcohol and does not use drugs.      Family History: family history includes Arthritis in her mother; Breast Cancer in her maternal grandmother; Cancer in her maternal grandmother and paternal grandmother; Mental Retardation in her maternal uncle. Home Medications:    Prior to Admission medications    Medication Sig Start Date End Date Taking? Authorizing Provider   gabapentin (NEURONTIN) 100 MG capsule TAKE FIVE CAPSULES BY MOUTH DAILY 6/1/21 6/29/21 Yes Justin Perez MD   verapamil (CALAN SR) 120 MG extended release tablet Take 1 tablet by mouth daily 5/27/21  Yes Kira Thompson MD   ondansetron (ZOFRAN ODT) 4 MG disintegrating tablet Take 1 tablet by mouth every 8 hours as needed for Nausea or Vomiting 5/17/21  Yes WALTER Judge   acetaminophen (TYLENOL) 500 MG tablet Take 500 mg by mouth every 6 hours as needed for Pain   Yes Historical Provider, MD   nitroGLYCERIN (NITROSTAT) 0.3 MG SL tablet Place 1 tablet under the tongue every 5 minutes as needed for Chest pain up to max of 3 total doses. If no relief after 1 dose, call 911. 5/14/21  Yes Kira Thompson MD   ketorolac (TORADOL) 10 MG tablet Take 1 tablet by mouth every 8 hours as needed for Pain  Patient not taking: Reported on 6/1/2021 5/15/21 5/15/22  Fito Graf DO       REVIEW OF SYSTEMS:    All 14-point review of systems are completed and  pertinent positives are mentioned in the history of present illness. Other  systems are reviewed and are negative. Physical Examination:    BP (!) 130/90   Pulse 115   Wt 207 lb (93.9 kg)   LMP 08/07/2017 Comment: hyst 08/2017  BMI 37.86 kg/m²      Constitutional and General Appearance:    alert, cooperative, no distress and appears stated age  [de-identified]:    PERRLA, no cervical lymphadenopathy. No masses palpable.  Normal oral mucosa  Respiratory:  · Normal excursion and expansion without use of accessory muscles  · Resp Auscultation: Normal breath sounds without dullness or wheezing  Cardiovascular:  · The apical impulse is not displaced  · RRR S1S2 w/o M/G/R  Abdomen:  · No masses or tenderness  · Bowel sounds present  Extremities:  ·  No Cyanosis or Clubbing  ·  Lower extremity edema: No  · Skin: Warm and dry  Neurological:  · Alert and oriented. · Moves all extremities well  · No abnormalities of mood, affect, memory, mentation, or behavior are noted    DATA:      ECG 6/24/21: Personally reviewed. Stress test 5/5/2021  Summary There is a large area, moderate to severe, reversible perfusion defect  involving the anterior/anterolateral wall. FIndings are consistent with  inducible ischemia of the LAD territory. Hyperdynamic LV function with LVEF  > 70%. Overall findings represent a high risk scan. [facility] 5/7/2021  Impression:   1. Normal coronary arteries. 2. LVEDP 15 mmHg. Plan:   1. Optimal medical therapy for CAD risk factors. 2. Consider PFTs to evaluate for both obstructive and restrictive   lung disease. 3. It was recommend that she stop her keto diet and focus on   weight through a well-balanced heart healthy diet and regular   physical exercise for at least 30 minutes 3-5 times per week. 4. Follow-up with [facility] in one month. Echo 4/9/2021  Summary   Limited study. Left ventricular cavity size is normal. Normal left   ventricular wall thickness. Overall left ventricular systolic function   appears normal with an ejection fraction of 55%. No regional wall motion   abnormalities are noted. Estimated pulmonary artery systolic pressure is at   31 mmHg assuming a right atrial pressure of 3 mmHg. IMPRESSION:    1. Syncope/presyncope.  03/16/2021  Patient is a pleasant 25-year-old female with a medical history significant for fibromyalgia, sinus tachycardia, and syncope/presyncope. She states that since the last time she saw us in 2018 she has been doing well until over the last 3 months when she began to suffer from recurrent symptoms.   She previously was on metoprolol however discontinued as her symptoms improved in the past.  I am unclear as to what her symptoms are related to from a rhythm standpoint as her device shows no arrhythmias. I will asked that she wear a 30-day monitor and follow-up with us in clinic. I also asked that she follow-up with neurology as far as her migraines are concerned as this may be the true etiology of her symptoms.  - Thirty day monitor.  - Follow up with neurology. - Limited echocardiogram for edema.  - Follow up with me in a month. 04/13/2021  Patient underwent echocardiogram which showed normal cardiac function. Her monitor showed rare PACs and PVCs that were not symptomatic. No significant arrhythmias noted on her monitor. Patient did have presyncope and syncopal events. None of these occurred while she was wearing her heart monitor. From a symptom standpoint appears to be vagal in nature. I will asked that she repeat a tilt table test.  Given her chest pain I will asked that she have a stress test as well as she is post hysterectomy with bilateral oophorectomies.  -Schedule exercise stress test.  - Schedule tilt table testing.  - Continue current course but consider fludrocortisone or midodrine after tilt table test.    5/27/2021  Patient is here for follow-up post loop monitor explantation. She continues to have some pain around her explantation site and just below where blister was unintentionally ruptured. We will give her some more pain medication some antibiotics to help with this. Will refer her to plastic surgery for evaluation. We will switch her from propranolol to verapamil for possible spasms. Does have some wrist discomfort bilaterally that is getting better post radial arterial cannulation. We will need to continue to monitor this. I will asked her to follow-up with me in 3 to 4 weeks. - Start doxycycline 100 mg BID. - Stop propanolol and switch to verapamil for spasms and palpitations. - Referral to breast surgery.   - Follow up with me in 3-4 weeks.    6/24/2021  Patient presents for follow-up. As far as her breast concerned it has significantly improved and her scar appears to be healing well. Patient is a very in tune person with a strong mind-body connection, as given by her fibromyalgia. She has been under tremendous amount of stress and in my opinion this has contributed to her physical deterioration symptoms/body wise. She has had some improvement with her verapamil however due to her mental status and pressures I believe that her body is significantly impaired. I will refer her to a psychiatrist and I am hopeful that in combination with the medical therapy decide prescribed that she will continue to improve. I will ask her to follow-up with me in 3 months. Relationship to her overall. I will extend her time off.  - Psychiatry referral.  - Continue verapamil.  - Follow up in 4 weeks. 2. Migraines/altered mental state. Etiology unclear. - Plan as per above. 3. Chest pain     4. Peripheral edema     5. Dizziness  4/13/2021  Katherine Rahman is here today for follow up for syncope/presyncope. AVTAR from 3/2021 showed sinus rhythm with tachycardia, very rare PAC's, and PVC's. At her last visit she was referred to neurology. She has had 4-5 episodes of near syncope in the last 2 weeks. Last episode she had chest pain that radiated into her arms. She has a history ovary removal and then her uterus a few years later due to endometriosis. She would like to have her ILR removed. She is of work until 4/22/2021 due to her symptoms.   - Plan as per above. 5/27/2021 - 06/24/2021  - Plan as per above. RECOMMENDATIONS:   1. Referral to psychiatry for stress management tools. 2. Increase water intake to at least 64 oz per day. 3. Follow up with me in 3 months. QUALITY MEASURES  1. Tobacco Cessation Counseling: NA  2. Retake of BP if >140/90:   NA  3.  Documentation to PCP/referring for new patient:  Sent to PCP at close of office visit  4. CAD patient on anti-platelet: NA  5. CAD patient on STATIN therapy:  NA  6. Patient with CHF and aFib on anticoagulation:  NA     All questions and concerns were addressed to the patient/family. Alternatives to my treatment were discussed. The scribe's documentation has been prepared under my direction and personally reviewed by me in its entirety. I confirm that the note above accurately reflects all work, physical examination, the discussion of treatments and procedures, and medical decision making performed by me. Nadia Herrera RN, am scribing for and in the presence of Dr. Paolo Sauer. 06/24/21 8:42 AM   Main Menezes RN    The scribe's documentation has been prepared under my direction and personally reviewed by me in its entirety. I confirm that the note above accurately reflects all work, physical examination, the discussion of treatments and procedures, and medical decision making performed by me. Ashley Carr MD personally performed the services described in this documentation as scribed by nurse in my presence, and is both accurate and complete. Electronically signed by Marlon Mohs, MD on 6/24/2021 at 10:27 AM     Dr. Butch Madrigal MD  Electrophysiology  [facility]. [address]. [place], [address]  Phone: [phone]  Fax: [phone]     NOTE: This report was transcribed using voice recognition software. Every effort was made to ensure accuracy, however, inadvertent computerized transcription errors may be present.

## 2021-06-24 ENCOUNTER — OFFICE VISIT (OUTPATIENT)
Dept: CARDIOLOGY CLINIC | Age: 31
End: 2021-06-24
Payer: COMMERCIAL

## 2021-06-24 VITALS
WEIGHT: 207 LBS | DIASTOLIC BLOOD PRESSURE: 90 MMHG | HEART RATE: 115 BPM | BODY MASS INDEX: 37.86 KG/M2 | SYSTOLIC BLOOD PRESSURE: 130 MMHG

## 2021-06-24 DIAGNOSIS — R94.39 ABNORMAL STRESS TEST: ICD-10-CM

## 2021-06-24 DIAGNOSIS — R55 SYNCOPE, UNSPECIFIED SYNCOPE TYPE: ICD-10-CM

## 2021-06-24 DIAGNOSIS — G90.A POTS (POSTURAL ORTHOSTATIC TACHYCARDIA SYNDROME): ICD-10-CM

## 2021-06-24 DIAGNOSIS — R42 DIZZINESS: Primary | ICD-10-CM

## 2021-06-24 DIAGNOSIS — R00.2 PALPITATION: ICD-10-CM

## 2021-06-24 DIAGNOSIS — R00.0 TACHYCARDIA: ICD-10-CM

## 2021-06-24 PROCEDURE — 93000 ELECTROCARDIOGRAM COMPLETE: CPT | Performed by: INTERNAL MEDICINE

## 2021-06-24 PROCEDURE — 99213 OFFICE O/P EST LOW 20 MIN: CPT | Performed by: INTERNAL MEDICINE

## 2021-06-24 NOTE — LETTER
[facility], operated by [facility]   Electrophysiology Consult Note            Date: 6/24/21  Patient Name: Rishabh Potts  YOB: 1990    Primary Care Physician: Colby Ha MD    CHIEF COMPLAINT:   Chief Complaint   Patient presents with    Follow-up     3-4 weeks    Dizziness     pt reports that she feels like she is going to pass out, but has not passed out.  Other     numbness and tingling arms and legs     HISTORY OF PRESENT ILLNESS: Rishabh Potts is a 31 y.o. female with a PMH of previous syncope/tachycardia s/p ILR 5/24/2017, chronic migraine headache, fibromyalgia, adenomyosis status post hysterectomy and salpingo-oophorectomy who presented to the ED on 3/2/2021 with concerns of fatigue. She reports she was told all her testing was normal and returned to work. She reports she felt very tired and was experiencing palpitations with associated chest pain. She reports she did not remember anything that happened after that, but she was told she was laying on the floor and had missed 12 work phone calls. On 3/16/2021, her ECG demonstrates SR 84 BPM. She reports she does not have much notice prior to her syncopal episodes. She reported that her syncopal episodes are happening more frequently. She does notice she usually has HA prior to episodes and then she will experience tunnel vision prior to losing consciousness. She reports that if she does feel it coming on, she lays down and tried to rest, which typically helps resolve the issue. She reports she has seen a neurologist in the past for syncope but does not recall what what said. She reports that she experiences edema in her extremities and around her eyes. She reports she was unable to metoprolol due to fatigue and bradycardia. She reports she has been taking gabapentin for about 8 months. She reports she stopped taking it a few days ago to see if this would make a difference, and her syncopal episodes have persisted.  She reports she tried to stop her premarin for several days which did not help her syncopal episodes either. Patient denies current SOB. Since her last visit, she wore a AVTAR from 3/16-3/26/2021 which showed an average heart rate of 86, lowest 58, highest 168 bpm, sinus rhythm with tachycardia, very rare PAC's, and PVC's. at her last visit, she was referred to neurology. Today 4/13/2021, she she states she had bot been feeling well and has had 4-5 episodes of almost passing out. With one spell she was outside with her sister in law and was helping with landscaping. She started to feel strange and her sister in law voice sounded like in was at a distance. She had to lay on the ground in order not to pas out. 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 During this spell she had chest pain that radiated into her arms. She has been staying well hydrated but is also trying to loose weight. She states her weight has gone up 4 lbs (207-211) and has foot swelling. She reports having issues with low heart rates in the past on beta blockers. She will be seeing neurology this month. Patient underwent a stress test 5/5/2021 that showed ischemia. She underwent a LHC 5/7/2021 that did not show any evidence of CAD. On 5/14/2021, patient underwent a tilt table test study (normal physiologic response) and ILR removal 5/27/2021. ILR removal was complicated by ILR migrating to under fibrous breat tissues. Patient required sedation to aid with removal. Cellulitis post operatively requiring antibiotics and frequent post op site checks. On 5/27/2021, patient's ECG demonstrated SR 65 BPM. She reported her ILR site is still painful. She described the pain as \"very deep\". She had tried heat and ice for pain relief but this has not been helpful. It prevented her from sleeping. She reported the tramadol is not helping with pain control.  She reported she received a referral for a breast surgeon from the ED, but is unsure if she should make an appointment. She reported she had been waking up in the middle of the night with chest \"spasms\" and took her pulse while she was still laying down and her HR was 36 BPM. This resolved with activity. She reported that she feels her palpitations when her HR is very low or very high. She reported that she gets SOB with exertion when she walks more than 20 feet at a time. She reported she has been experiencing numbness, coldness and tingling  in both hands. Interval History since last office visit: Today, 6/24/21, ECG demonstrates SR (75). She presents today feeling very dizzy and feeling like her heart is racing, she appears diaphoretic. She states that she has been having these dizzy spells on and of for about a week now. She states that these episodes seem to have been exacerbated with a great deal of family stress at home. She states she has been drinking 4-7, 16 oz bottles of water per day on average. She states that she thinks her medication is helping with her chest pain and it has decreased her chest pain episodes. She states her ILR site is healing well. She states she is taking her medications as prescribed and tolerates them well. Patient denies current edema, chest pain or sob. Past Medical History:   has a past medical history of Anemia, Anxiety, Heart abnormalities, Miscarriage, Ovarian cyst, and Tricuspid regurgitation. Past Surgical History:   has a past surgical history that includes Appendectomy (2006); Ovarian cyst surgery; Endometrial ablation (02/01/2017); Insertable Cardiac Monitor (05/24/2017); Hysterectomy; Cardiac catheterization (05/07/2021); and Hysterectomy, total abdominal.     Allergies:  Demerol and Procardia [nifedipine]    Social History:   reports that she has never smoked. She has never used smokeless tobacco. She reports that she does not drink alcohol and does not use drugs.      Family History: family history includes Arthritis in her mother; Breast Cancer in her maternal grandmother; Cancer in her maternal grandmother and paternal grandmother; Mental Retardation in her maternal uncle. Home Medications:    Prior to Admission medications    Medication Sig Start Date End Date Taking? Authorizing Provider   gabapentin (NEURONTIN) 100 MG capsule TAKE FIVE CAPSULES BY MOUTH DAILY 6/1/21 6/29/21 Yes Damian Cespedes MD   verapamil (CALAN SR) 120 MG extended release tablet Take 1 tablet by mouth daily 5/27/21  Yes Karolina Conway MD   ondansetron (ZOFRAN ODT) 4 MG disintegrating tablet Take 1 tablet by mouth every 8 hours as needed for Nausea or Vomiting 5/17/21  Yes WALTER Bhakta   acetaminophen (TYLENOL) 500 MG tablet Take 500 mg by mouth every 6 hours as needed for Pain   Yes Historical Provider, MD   nitroGLYCERIN (NITROSTAT) 0.3 MG SL tablet Place 1 tablet under the tongue every 5 minutes as needed for Chest pain up to max of 3 total doses. If no relief after 1 dose, call 911. 5/14/21  Yes Karolina Conway MD   ketorolac (TORADOL) 10 MG tablet Take 1 tablet by mouth every 8 hours as needed for Pain  Patient not taking: Reported on 6/1/2021 5/15/21 5/15/22  [facility], DO       REVIEW OF SYSTEMS:    All 14-point review of systems are completed and  pertinent positives are mentioned in the history of present illness. Other  systems are reviewed and are negative. Physical Examination:    BP (!) 130/90   Pulse 115   Wt 207 lb (93.9 kg)   LMP 08/07/2017 Comment: hy 08/2017  BMI 37.86 kg/m²      Constitutional and General Appearance:    alert, cooperative, no distress and appears stated age  [de-identified]:    PERRLA, no cervical lymphadenopathy. No masses palpable.  Normal oral mucosa  Respiratory:  · Normal excursion and expansion without use of accessory muscles  · Resp Auscultation: Normal breath sounds without dullness or wheezing  Cardiovascular:  · The apical impulse is not displaced  · RRR S1S2 w/o M/G/R  Abdomen:  · No masses or tenderness  · Bowel sounds present  Extremities:  ·  No Cyanosis or Clubbing  ·  Lower extremity edema: No  · Skin: Warm and dry  Neurological:  · Alert and oriented. · Moves all extremities well  · No abnormalities of mood, affect, memory, mentation, or behavior are noted    DATA:      ECG 6/24/21: Personally reviewed. Stress test 5/5/2021  Summary There is a large area, moderate to severe, reversible perfusion defect  involving the anterior/anterolateral wall. FIndings are consistent with  inducible ischemia of the LAD territory. Hyperdynamic LV function with LVEF  > 70%. Overall findings represent a high risk scan. [facility] 5/7/2021  Impression:   1. Normal coronary arteries. 2. LVEDP 15 mmHg. Plan:   1. Optimal medical therapy for CAD risk factors. 2. Consider PFTs to evaluate for both obstructive and restrictive   lung disease. 3. It was recommend that she stop her keto diet and focus on   weight through a well-balanced heart healthy diet and regular   physical exercise for at least 30 minutes 3-5 times per week. 4. Follow-up with Ok Cazares in one month. Echo 4/9/2021  Summary   Limited study. Left ventricular cavity size is normal. Normal left   ventricular wall thickness. Overall left ventricular systolic function   appears normal with an ejection fraction of 55%. No regional wall motion   abnormalities are noted. Estimated pulmonary artery systolic pressure is at   31 mmHg assuming a right atrial pressure of 3 mmHg. IMPRESSION:    1. Syncope/presyncope.  03/16/2021  Patient is a pleasant 56-year-old female with a medical history significant for fibromyalgia, sinus tachycardia, and syncope/presyncope. She states that since the last time she saw us in 2018 she has been doing well until over the last 3 months when she began to suffer from recurrent symptoms.   She previously was on metoprolol however discontinued as her symptoms improved in the past.  I am unclear as to what her symptoms are related to from a rhythm standpoint as her device shows no arrhythmias. I will asked that she wear a 30-day monitor and follow-up with us in clinic. I also asked that she follow-up with neurology as far as her migraines are concerned as this may be the true etiology of her symptoms.  - Thirty day monitor.  - Follow up with neurology. - Limited echocardiogram for edema.  - Follow up with me in a month. 04/13/2021  Patient underwent echocardiogram which showed normal cardiac function. Her monitor showed rare PACs and PVCs that were not symptomatic. No significant arrhythmias noted on her monitor. Patient did have presyncope and syncopal events. None of these occurred while she was wearing her heart monitor. From a symptom standpoint appears to be vagal in nature. I will asked that she repeat a tilt table test.  Given her chest pain I will asked that she have a stress test as well as she is post hysterectomy with bilateral oophorectomies.  -Schedule exercise stress test.  - Schedule tilt table testing.  - Continue current course but consider fludrocortisone or midodrine after tilt table test.    5/27/2021  Patient is here for follow-up post loop monitor explantation. She continues to have some pain around her explantation site and just below where blister was unintentionally ruptured. We will give her some more pain medication some antibiotics to help with this. Will refer her to plastic surgery for evaluation. We will switch her from propranolol to verapamil for possible spasms. Does have some wrist discomfort bilaterally that is getting better post radial arterial cannulation. We will need to continue to monitor this. I will asked her to follow-up with me in 3 to 4 weeks. - Start doxycycline 100 mg BID. - Stop propanolol and switch to verapamil for spasms and palpitations. - Referral to breast surgery. - Follow up with me in 3-4 weeks. 6/24/2021  Patient presents for follow-up. As far as her breast concerned it has significantly improved and her scar appears to be healing well. Patient is a very in tune person with a strong mind-body connection, as given by her fibromyalgia. She has been under tremendous amount of stress and in my opinion this has contributed to her physical deterioration symptoms/body wise. She has had some improvement with her verapamil however due to her mental status and pressures I believe that her body is significantly impaired. I will refer her to a psychiatrist and I am hopeful that in combination with the medical therapy decide prescribed that she will continue to improve. I will ask her to follow-up with me in 3 months. Relationship to her overall. I will extend her time off.  - Psychiatry referral.  - Continue verapamil.  - Follow up in 4 weeks. 2. Migraines/altered mental state. Etiology unclear. - Plan as per above. 3. Chest pain     4. Peripheral edema     5. Dizziness  4/13/2021  John Saldivar is here today for follow up for syncope/presyncope. AVTAR from 3/2021 showed sinus rhythm with tachycardia, very rare PAC's, and PVC's. At her last visit she was referred to neurology. She has had 4-5 episodes of near syncope in the last 2 weeks. Last episode she had chest pain that radiated into her arms. She has a history ovary removal and then her uterus a few years later due to endometriosis. She would like to have her ILR removed. She is of work until 4/22/2021 due to her symptoms.   - Plan as per above. 5/27/2021 - 06/24/2021  - Plan as per above. RECOMMENDATIONS:   1. Referral to psychiatry for stress management tools. 2. Increase water intake to at least 64 oz per day. 3. Follow up with me in 3 months. QUALITY MEASURES  1. Tobacco Cessation Counseling: NA  2. Retake of BP if >140/90:   NA  3. Documentation to PCP/referring for new patient:  Sent to PCP at close of office visit  4.  CAD

## 2021-06-24 NOTE — PATIENT INSTRUCTIONS
RECOMMENDATIONS:   1. Referral to psychiatry for stress management tools. 2. Increase water intake to at least 64 oz per day. 3. Follow up with me in 3 months.

## 2021-06-27 ASSESSMENT — ENCOUNTER SYMPTOMS
NAUSEA: 0
BLOOD IN STOOL: 0
CONSTIPATION: 0
SHORTNESS OF BREATH: 0
VOMITING: 0
COUGH: 0
PHOTOPHOBIA: 0
DIARRHEA: 0
RHINORRHEA: 0
ABDOMINAL PAIN: 0
SORE THROAT: 0
EYE PAIN: 0

## 2021-06-28 RX ORDER — GABAPENTIN 100 MG/1
CAPSULE ORAL
Qty: 150 CAPSULE | Refills: 0 | Status: SHIPPED | OUTPATIENT
Start: 2021-07-02 | End: 2021-07-12 | Stop reason: SDUPTHER

## 2021-07-09 ENCOUNTER — OFFICE VISIT (OUTPATIENT)
Dept: INTERNAL MEDICINE CLINIC | Age: 31
End: 2021-07-09

## 2021-07-09 VITALS
WEIGHT: 205 LBS | HEART RATE: 70 BPM | BODY MASS INDEX: 37.73 KG/M2 | DIASTOLIC BLOOD PRESSURE: 65 MMHG | RESPIRATION RATE: 18 BRPM | HEIGHT: 62 IN | SYSTOLIC BLOOD PRESSURE: 120 MMHG

## 2021-07-09 DIAGNOSIS — F41.9 ANXIETY: ICD-10-CM

## 2021-07-09 DIAGNOSIS — R55 SYNCOPE, UNSPECIFIED SYNCOPE TYPE: ICD-10-CM

## 2021-07-09 DIAGNOSIS — M79.7 FIBROMYALGIA: Primary | ICD-10-CM

## 2021-07-09 PROCEDURE — 99212 OFFICE O/P EST SF 10 MIN: CPT | Performed by: INTERNAL MEDICINE

## 2021-07-09 NOTE — PROGRESS NOTES
Kiara Jimenez (:  1990) is a 31 y.o. female,Established patient, here for evaluation of the following chief complaint(s):  No chief complaint on file. SUBJECTIVE/OBJECTIVE:    HPI    31 y.o. female with hx of fibromyalgia here for regular f.w    Since last time, pt had syncopal event at work leading to cardiology consultation, had previous loop recorder showing sinus tachycardia with PAC but no abnormal rhythm. Had abnormal stress test and normal angiogram. Tilt table study was normal. Placed on verapamil by EP and reports no further syncope but has presyncopal events  Thought these events could be psychiatric in nature with increased stress . Currently seeing a counsellor  Not on any meds      Continues to report multiple somatic issues - low back pain ,bilateral feet pain which are progressive   Today reports having pain in bottom of foot, pedal edema, unable to walk much, using some inserts which helps    Had multiple testing for lumbar spine and hip pain with no etiology found  Reports no nsaids have worked - tried [employer], diclofenac, celebrex, ibuprofen with no benefit       Recently we have added gabapentin for fibromyalgia symptoms and pt reports some improvement     Reports anxiety - lexapro did not help.  Later started on elavil with too much drowsiness         Working out to lose weight- going to gym    Lives with 3 children and     Allergies   Allergen Reactions    Demerol Other (See Comments)     Hallucinations, nausea, aka MEPERIDINE    Procardia [Nifedipine] Rash     Current Outpatient Medications   Medication Sig Dispense Refill    gabapentin (NEURONTIN) 100 MG capsule TAKE FIVE CAPSULES BY MOUTH DAILY 150 capsule 0    verapamil (CALAN SR) 120 MG extended release tablet Take 1 tablet by mouth daily 90 tablet 1    ondansetron (ZOFRAN ODT) 4 MG disintegrating tablet Take 1 tablet by mouth every 8 hours as needed for Nausea or Vomiting 30 tablet 0    acetaminophen (TYLENOL) arteries. 2. LVEDP 15 mmHg. Diagnosis Orders   1. Fibromyalgia     2. Anxiety     3. Syncope, unspecified syncope type         Recurrent presyncope /syncope - extensive workup as above neg  Stress test positive but cath neg  Tilt table testing neg  tsh wnl  Echo wnl   Off work for now - on verapamil 120 mg daily   Possibly related to anxiety . Now seeing therapist    Anxiety - now f/w psychiatrist . Not on any meds yet    Fibromyalgia - multiple somatic complaints - previous investigations have been neg - mulitple meds did not help  - trial of celebrex recently with no relief  - changed to relafen , did not help  - add foot inserts , consider to see podiatry  - added gabapentin now at 500 mg daily on divided doses - can change ot 200 mg TID     Obesity noted - weight loss and life style modification along with dietary changes, increased physical activity encouraged    Pedal edema- not noted today. Has lasix to take prn       Recommend covid vaccine         An electronic signature was used to authenticate this note.     --Cruz Arce MD

## 2021-07-12 RX ORDER — FUROSEMIDE 20 MG/1
20 TABLET ORAL DAILY PRN
Qty: 30 TABLET | Refills: 0 | Status: SHIPPED | OUTPATIENT
Start: 2021-07-12 | End: 2021-09-06 | Stop reason: SDUPTHER

## 2021-07-12 RX ORDER — GABAPENTIN 100 MG/1
200 CAPSULE ORAL 3 TIMES DAILY
Qty: 180 CAPSULE | Refills: 0 | Status: SHIPPED | OUTPATIENT
Start: 2021-07-12 | End: 2021-07-27 | Stop reason: SDUPTHER

## 2021-07-12 NOTE — TELEPHONE ENCOUNTER
----- Message from Johnna Montejo MD sent at 7/12/2021  1:38 PM EDT -----  Contact: [phone] (H)  Lasix 20 mg daily prn edema   #30  Gabapentin 200 mg tid   ----- Message -----  From: Waqas Sellers  Sent: 7/12/2021  12:55 PM EDT  To: Johnna Montejo MD    Pt called and stated that her medication for her new water pill prescribed from last visit and gabapentin (NEURONTIN) 100 MG capsule to match taking 6 x a day has not been sent to her pharmacy.      [facility] [address] [facility] [phone]    Thank you  BN

## 2021-07-27 RX ORDER — GABAPENTIN 100 MG/1
200 CAPSULE ORAL 3 TIMES DAILY
Qty: 180 CAPSULE | Refills: 0 | Status: SHIPPED | OUTPATIENT
Start: 2021-07-28 | End: 2021-09-27

## 2021-08-19 ENCOUNTER — TELEPHONE (OUTPATIENT)
Dept: CARDIOLOGY CLINIC | Age: 31
End: 2021-08-19

## 2021-08-19 NOTE — TELEPHONE ENCOUNTER
Spoke to  Pt. She is concerned and worried she is going to lose her job. Pt said she needs to be off work until September. Pt had Loop Recorder Explanted 5/14/21AGK. , A Tilt Table Test 5/14/21, and a LHC5/7/21 [facility]. Pt's last OV 6/24/21[facility]K. [address] [place],[address] please advise. Pt can be reached at [phone].       TY

## 2021-08-24 NOTE — TELEPHONE ENCOUNTER
All of pt's Disability PPW has been faxed and confirmed to [facility]. All of pt's procedure's( Loop Recorder Explant, Tilt Table Test, and LHC)  and OV's have also been attached per AGK with AGK's signature. [facility] said that a letter would not be sufficient enough, so I faxed everything for the pt, including a list of all her medications. Pt has been notified.       TY

## 2021-08-31 NOTE — TELEPHONE ENCOUNTER
Pt stated [place] is now requiring other paper work be filled out for pt's short term disability. Pt emailed the paper work to me and I placed in [employer] fax folder up front. Pt needs this filled out asap since she has not been getting paid. Pt is requesting that we email a completed copy to her. I placed a post it note on the paperwork with her email. Please call pt and let her know once completed and emailed back to her.

## 2021-09-02 ENCOUNTER — TELEPHONE (OUTPATIENT)
Dept: CARDIOLOGY CLINIC | Age: 31
End: 2021-09-02

## 2021-09-03 ENCOUNTER — TELEPHONE (OUTPATIENT)
Dept: CARDIOLOGY CLINIC | Age: 31
End: 2021-09-03

## 2021-09-03 ENCOUNTER — TELEPHONE (OUTPATIENT)
Dept: INTERNAL MEDICINE CLINIC | Age: 31
End: 2021-09-03

## 2021-09-03 RX ORDER — CITALOPRAM 10 MG/1
10 TABLET ORAL NIGHTLY
Qty: 30 TABLET | Refills: 0 | Status: SHIPPED | OUTPATIENT
Start: 2021-09-03 | End: 2021-10-04 | Stop reason: SDUPTHER

## 2021-09-03 NOTE — TELEPHONE ENCOUNTER
All medications have potential for interaction. There is no clear contraindication to any of her cardiac medications. Thanks.

## 2021-09-03 NOTE — TELEPHONE ENCOUNTER
Pt's  called to let us know that pt's disability claim got denied. Pt is now up for termination.  stated the denial was due to not receiving the paper work from us. Can we please call pt with an update on where we are at with the disability paperwork.  Pts  Tapan Contreras can be reached @ [phone]

## 2021-09-03 NOTE — TELEPHONE ENCOUNTER
Spoke with the patients  , in other encounter for different concern . Advised that we havent heard anything back in regards to concerns below .  voiced understanding and states it is OK if nothing is heard back until Monday.

## 2021-09-03 NOTE — TELEPHONE ENCOUNTER
Pt's  Tucker Perkins called to let [address] [place],[address] know that PCP put her on the Celexa 10 mg once nightly to help with anxiety and stress. Tuckermariposa Perkins wanted to make sure this would not interfere with any of her cardiac medications.  Please call Tucker Perkins @ [phone]

## 2021-09-07 RX ORDER — FUROSEMIDE 20 MG/1
20 TABLET ORAL DAILY PRN
Qty: 30 TABLET | Refills: 0 | Status: SHIPPED | OUTPATIENT
Start: 2021-09-07 | End: 2021-10-29 | Stop reason: SDUPTHER

## 2021-09-07 NOTE — TELEPHONE ENCOUNTER
Spoke with the patient and advised her of the message below per [address] [facility],[address] . Pt voiced understanding .  Call complete

## 2021-09-15 ENCOUNTER — TELEPHONE (OUTPATIENT)
Dept: INTERNAL MEDICINE CLINIC | Age: 31
End: 2021-09-15

## 2021-09-15 DIAGNOSIS — Z20.822 CLOSE EXPOSURE TO COVID-19 VIRUS: Primary | ICD-10-CM

## 2021-09-15 NOTE — TELEPHONE ENCOUNTER
----- Message from Jenna Joya sent at 9/15/2021  1:21 PM EDT -----  Contact: Ramo Leonard [phone]    ----- Message -----  From: Cesar Ballesteros  Sent: 9/15/2021   1:09 PM EDT  To: Vini Sarah MD    Patient needs a referral to have a covid test at [facility] they require it. She has been exposed to covid and needs to get the tested.

## 2021-09-16 ENCOUNTER — TELEPHONE (OUTPATIENT)
Dept: INTERNAL MEDICINE CLINIC | Age: 31
End: 2021-09-16

## 2021-09-16 NOTE — TELEPHONE ENCOUNTER
----- Message from Etta Mazariegos sent at 9/15/2021  1:45 PM EDT -----  ContactDarian Lee [phone]  Attempted to contact patient, unable to leave message. Order faxed  ----- Message -----  From: Etta Mazariegos  Sent: 9/15/2021   1:21 PM EDT  To: Etta Mazariegos      ----- Message -----  From: Hamida Solano  Sent: 9/15/2021   1:09 PM EDT  To: Antonia Basilio MD    Patient needs a referral to have a covid test at [facility] they require it. She has been exposed to covid and needs to get the tested.

## 2021-09-16 NOTE — TELEPHONE ENCOUNTER
----- Message from Leti Pepe sent at 9/15/2021  1:45 PM EDT -----  ContactJunnader Gonzalez [phone]  Attempted to contact patient, unable to leave message. Order faxed  ----- Message -----  From: Edsonoly Sathyamarielle  Sent: 9/15/2021   1:21 PM EDT  To: Leti Pepe      ----- Message -----  From: Tony Reese  Sent: 9/15/2021   1:09 PM EDT  To: Saw Moran MD    Patient needs a referral to have a covid test at [facility] they require it. She has been exposed to covid and needs to get the tested.

## 2021-09-23 ENCOUNTER — TELEPHONE (OUTPATIENT)
Dept: INTERNAL MEDICINE CLINIC | Age: 31
End: 2021-09-23

## 2021-09-23 NOTE — TELEPHONE ENCOUNTER
----- Message from Sawyer George MD sent at 9/23/2021  3:03 PM EDT -----  REFILL MEDS , NEXT MONTH  ----- Message -----  From: Harley Spivey  Sent: 9/23/2021   1:44 PM EDT  To: Sawyer George MD    When do you want to see pt for anxiety med follow up?  ----- Message -----  From: Sawyer George MD  Sent: 9/23/2021   1:24 PM EDT  To: Harley Spivey    I cannot do disability papers regarding her heart condition  ----- Message -----  From: Harley Spivey  Sent: 9/23/2021  12:34 PM EDT  To: Sawyer George MD    Patient states you put her on new anxiety medication because she could not get in to see her cardiologist.  She was supposed to follow up with you regarding the new medication but she had to cancel as she was getting tested for COVID. Patient is negative for COVID and wanting to know when you can see her? Also has disability paperwork that Dr. Praveen Flynn had been filling but now insurance is sending paperwork to patient's other doctors. Patient is needing you to fill out form because you prescribed the anxiety med. Please advise.

## 2021-09-23 NOTE — PROGRESS NOTES
[address]   Electrophysiology Consult Note            Date: 9/23/21  Patient Name: Tabatha Mcginnis  YOB: 1990    Primary Care Physician: Lyn Neville MD    CHIEF COMPLAINT:   No chief complaint on file. HISTORY OF PRESENT ILLNESS: Tabatha Mcginnis is a 27 y.o. female with a PMH of previous syncope/tachycardia s/p ILR 5/24/2017, chronic migraine headache, fibromyalgia, adenomyosis status post hysterectomy and salpingo-oophorectomy who presented to the ED on 3/2/2021 with concerns of fatigue. She reports she was told all her testing was normal and returned to work. She reports she felt very tired and was experiencing palpitations with associated chest pain. She reports she did not remember anything that happened after that, but she was told she was laying on the floor and had missed 12 work phone calls. On 3/16/2021, her ECG demonstrates SR 84 BPM. She reports she does not have much notice prior to her syncopal episodes. She reported that her syncopal episodes are happening more frequently. She does notice she usually has HA prior to episodes and then she will experience tunnel vision prior to losing consciousness. She reports that if she does feel it coming on, she lays down and tried to rest, which typically helps resolve the issue. She reports she has seen a neurologist in the past for syncope but does not recall what what said. She reports that she experiences edema in her extremities and around her eyes. She reports she was unable to metoprolol due to fatigue and bradycardia. She reports she has been taking gabapentin for about 8 months. She reports she stopped taking it a few days ago to see if this would make a difference, and her syncopal episodes have persisted. She reports she tried to stop her premarin for several days which did not help her syncopal episodes either. Patient denies current SOB.     She wore a AVTAR from 3/16-3/26/2021 which showed an average heart rate of 86, lowest 58, highest 168 bpm, sinus rhythm with tachycardia, very rare PAC's, and PVC's. at her last visit, she was referred to neurology. Today 4/13/2021, she she states she had bot been feeling well and has had 4-5 episodes of almost passing out. With one spell she was outside with her sister in law and was helping with landscaping. She started to feel strange and her sister in law voice sounded like in was at a distance. She had to lay on the ground in order not to pas out. 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 During this spell she had chest pain that radiated into her arms. She has been staying well hydrated but is also trying to loose weight. She states her weight has gone up 4 lbs (207-211) and has foot swelling. She reports having issues with low heart rates in the past on beta blockers. She will be seeing neurology this month. Patient underwent a stress test 5/5/2021 that showed ischemia. She underwent a LHC 5/7/2021 that did not show any evidence of CAD. On 5/14/2021, patient underwent a tilt table test study (normal physiologic response) and ILR removal 5/27/2021. ILR removal was complicated by ILR migrating to under fibrous breat tissues. Patient required sedation to aid with removal. Cellulitis post operatively requiring antibiotics and frequent post op site checks. On 5/27/2021, patient's ECG demonstrated SR 65 BPM. She reported her ILR site is still painful. She described the pain as \"very deep\". She had tried heat and ice for pain relief but this has not been helpful. It prevented her from sleeping. She reported the tramadol is not helping with pain control. She reported she received a referral for a breast surgeon from the ED, but is unsure if she should make an appointment.  She reported she had been waking up in the middle of the night with chest \"spasms\" and took her pulse while she was still laying down and her HR was 36 BPM. This resolved with activity. She reported that she feels her palpitations when her HR is very low or very high. She reported that she gets SOB with exertion when she walks more than 20 feet at a time. She reported she has been experiencing numbness, coldness and tingling  in both hands. Interval History since last office visit: Today, 6/24/21, ECG demonstrates SR (75). She presents today feeling very dizzy and feeling like her heart is racing, she appears diaphoretic. She states that she has been having these dizzy spells on and of for about a week now. She states that these episodes seem to have been exacerbated with a great deal of family stress at home. She states she has been drinking 4-7, 16 oz bottles of water per day on average. She states that she thinks her medication is helping with her chest pain and it has decreased her chest pain episodes. She states her ILR site is healing well. She states she is taking her medications as prescribed and tolerates them well. Patient denies current edema, chest pain or sob. Past Medical History:   has a past medical history of Anemia, Anxiety, Heart abnormalities, Miscarriage, Ovarian cyst, and Tricuspid regurgitation. Past Surgical History:   has a past surgical history that includes Appendectomy (2006); Ovarian cyst surgery; Endometrial ablation (02/01/2017); Insertable Cardiac Monitor (05/24/2017); Hysterectomy; Cardiac catheterization (05/07/2021); and Hysterectomy, total abdominal.     Allergies:  Demerol and Procardia [nifedipine]    Social History:   reports that she has never smoked. She has never used smokeless tobacco. She reports that she does not drink alcohol and does not use drugs. Family History: family history includes Arthritis in her mother; Breast Cancer in her maternal grandmother; Cancer in her maternal grandmother and paternal grandmother; Mental Retardation in her maternal uncle.     Home Medications:    Prior to Admission medications    Medication Sig Start Date End Date Taking? Authorizing Provider   furosemide (LASIX) 20 MG tablet Take 1 tablet by mouth daily as needed (edema) 9/7/21   Antonia Sadler MD   citalopram (CELEXA) 10 MG tablet Take 1 tablet by mouth nightly 9/3/21   Antonia Sadler MD   gabapentin (NEURONTIN) 100 MG capsule Take 2 capsules by mouth 3 times daily for 30 days. 7/28/21 8/27/21  Antonia Sadler MD   verapamil (CALAN SR) 120 MG extended release tablet Take 1 tablet by mouth daily 5/27/21   Mariano Perrin MD   acetaminophen (TYLENOL) 500 MG tablet Take 500 mg by mouth every 6 hours as needed for Pain    Historical Provider, MD   nitroGLYCERIN (NITROSTAT) 0.3 MG SL tablet Place 1 tablet under the tongue every 5 minutes as needed for Chest pain up to max of 3 total doses. If no relief after 1 dose, call 911. 5/14/21   MICHAEL Pollock MD       REVIEW OF SYSTEMS:    All 14-point review of systems are completed and  pertinent positives are mentioned in the history of present illness. Other  systems are reviewed and are negative. Physical Examination:    LMP 08/07/2017 Comment: hyst 08/2017     Constitutional and General Appearance:    alert, cooperative, no distress and appears stated age  [de-identified]:    PERRLA, no cervical lymphadenopathy. No masses palpable. Normal oral mucosa  Respiratory:  · Normal excursion and expansion without use of accessory muscles  · Resp Auscultation: Normal breath sounds without dullness or wheezing  Cardiovascular:  · The apical impulse is not displaced  · RRR S1S2 w/o M/G/R  Abdomen:  · No masses or tenderness  · Bowel sounds present  Extremities:  ·  No Cyanosis or Clubbing  ·  Lower extremity edema: No  · Skin: Warm and dry  Neurological:  · Alert and oriented. · Moves all extremities well  · No abnormalities of mood, affect, memory, mentation, or behavior are noted    DATA:      ECG 9/23/21: Personally reviewed.        Stress test 5/5/2021  Summary There is a large area, moderate to severe, reversible perfusion defect  involving the anterior/anterolateral wall. FIndings are consistent with  inducible ischemia of the LAD territory. Hyperdynamic LV function with LVEF  > 70%. Overall findings represent a high risk scan. [facility] 5/7/2021  Impression:   1. Normal coronary arteries. 2. LVEDP 15 mmHg. Plan:   1. Optimal medical therapy for CAD risk factors. 2. Consider PFTs to evaluate for both obstructive and restrictive   lung disease. 3. It was recommend that she stop her keto diet and focus on   weight through a well-balanced heart healthy diet and regular   physical exercise for at least 30 minutes 3-5 times per week. 4. Follow-up with [employer] in one month. Echo 4/9/2021  Summary   Limited study. Left ventricular cavity size is normal. Normal left   ventricular wall thickness. Overall left ventricular systolic function   appears normal with an ejection fraction of 55%. No regional wall motion   abnormalities are noted. Estimated pulmonary artery systolic pressure is at   31 mmHg assuming a right atrial pressure of 3 mmHg. IMPRESSION:    1. Syncope/presyncope. -Echo 4/9/2021 was normal   -Stress test 5/5/2021 d/t associated chest pain revealed there is a large area, moderate to severe, reversible perfusion defect  involving the anterior/anterolateral wall. -[facility] with normal Cors 5/9/2021   -Her monitor showed rare PACs and PVCs that were not symptomatic. No significant arrhythmias noted on her monitor. Patient did have presyncope and syncopal events. None of these occurred while she was wearing her heart monitor. From a symptom standpoint appears to be vagal in nature. -Tilt table 5/14/2021 with normal physiologic response, also had ILR removed. -Patient is a very in tune person with a strong mind-body connection, as given by her fibromyalgia.   She has been under tremendous amount of stress and in my opinion this has contributed to her physical deterioration symptoms/body wise. She has had some improvement with her verapamil however due to her mental status and pressures I believe that her body is significantly impaired. I will refer her to a psychiatrist and I am hopeful that in combination with the medical therapy decide prescribed that she will continue to improve. - Psychiatry referral placed at [facility] 6/24/2021      2. Migraines/altered mental state.   -Etiology unclear. 3. Chest pain    -Stress test 5/5/2021 d/t associated chest pain revealed there is a large area, moderate to severe, reversible perfusion defect  involving the anterior/anterolateral wall. -[facility] with normal Cors 5/9/2021          RECOMMENDATIONS:   1. ***      QUALITY MEASURES  1. Tobacco Cessation Counseling: NA  2. Retake of BP if >140/90:   NA  3. Documentation to PCP/referring for new patient:  Sent to PCP at close of office visit  4. CAD patient on anti-platelet: NA  5. CAD patient on STATIN therapy:  NA  6. Patient with CHF and aFib on anticoagulation:  NA     All questions and concerns were addressed to the patient/family. Alternatives to my treatment were discussed. Dr. Ana Del Valle MD  [facility] [address]. [place], [address]  Phone: [phone]  Fax: [phone]     NOTE: This report was transcribed using voice recognition software. Every effort was made to ensure accuracy, however, inadvertent computerized transcription errors may be present.

## 2021-09-23 NOTE — TELEPHONE ENCOUNTER
----- Message from Gabriella Chew MD sent at 9/23/2021  1:24 PM EDT -----  I cannot do disability papers regarding her heart condition  ----- Message -----  From: Rell Quispe  Sent: 9/23/2021  12:34 PM EDT  To: Gabriella Chew MD    Patient states you put her on new anxiety medication because she could not get in to see her cardiologist.  She was supposed to follow up with you regarding the new medication but she had to cancel as she was getting tested for COVID. Patient is negative for COVID and wanting to know when you can see her? Also has disability paperwork that Dr. Marta Azul had been filling but now insurance is sending paperwork to patient's other doctors. Patient is needing you to fill out form because you prescribed the anxiety med. Please advise.

## 2021-09-27 RX ORDER — GABAPENTIN 100 MG/1
200 CAPSULE ORAL 3 TIMES DAILY
Qty: 180 CAPSULE | Refills: 0 | Status: SHIPPED | OUTPATIENT
Start: 2021-09-27 | End: 2021-10-25

## 2021-09-27 NOTE — PROGRESS NOTES
[address]   Electrophysiology Consult Note            Date: 9/28/21  Patient Name: Nathan Cai  YOB: 1990    Primary Care Physician: Vini Sarah MD    CHIEF COMPLAINT:   Chief Complaint   Patient presents with    3 Month Follow-Up     Pt reports that she has episodes of racing heart since last OV.  Tachycardia     POTS     HISTORY OF PRESENT ILLNESS: Nathan Cai is a 27 y.o. female with a PMH of previous syncope/tachycardia s/p ILR 5/24/2017, chronic migraine headache, fibromyalgia, adenomyosis status post hysterectomy and salpingo-oophorectomy who presented to the ED on 3/2/2021 with concerns of fatigue. She reports she was told all her testing was normal and returned to work. She reports she felt very tired and was experiencing palpitations with associated chest pain. She reports she did not remember anything that happened after that, but she was told she was laying on the floor and had missed 12 work phone calls. On 3/16/2021, her ECG demonstrates SR 84 BPM. She reports she does not have much notice prior to her syncopal episodes. She reported that her syncopal episodes are happening more frequently. She does notice she usually has HA prior to episodes and then she will experience tunnel vision prior to losing consciousness. She reports that if she does feel it coming on, she lays down and tried to rest, which typically helps resolve the issue. She reports she has seen a neurologist in the past for syncope but does not recall what what said. She reports that she experiences edema in her extremities and around her eyes. She reports she was unable to metoprolol due to fatigue and bradycardia. She reports she has been taking gabapentin for about 8 months. She reports she stopped taking it a few days ago to see if this would make a difference, and her syncopal episodes have persisted.  She reports she tried to stop her premarin for several days which did not help her syncopal episodes either. Patient denies current SOB. She wore a AVTAR from 3/16-3/26/2021 which showed an average heart rate of 86, lowest 58, highest 168 bpm, sinus rhythm with tachycardia, very rare PAC's, and PVC's. at her last visit, she was referred to neurology. Today 4/13/2021, she she states she had bot been feeling well and has had 4-5 episodes of almost passing out. With one spell she was outside with her sister in law and was helping with landscaping. She started to feel strange and her sister in law voice sounded like in was at a distance. She had to lay on the ground in order not to pas out. 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 During this spell she had chest pain that radiated into her arms. She has been staying well hydrated but is also trying to loose weight. She states her weight has gone up 4 lbs (207-211) and has foot swelling. She reports having issues with low heart rates in the past on beta blockers. She will be seeing neurology this month. Patient underwent a stress test 5/5/2021 that showed ischemia. She underwent a LHC 5/7/2021 that did not show any evidence of CAD. On 5/14/2021, patient underwent a tilt table test study (normal physiologic response) and ILR removal 5/27/2021. ILR removal was complicated by ILR migrating to under fibrous breat tissues. Patient required sedation to aid with removal. Cellulitis post operatively requiring antibiotics and frequent post op site checks. On 5/27/2021, patient's ECG demonstrated SR 65 BPM. She reported her ILR site is still painful. She described the pain as \"very deep\". She had tried heat and ice for pain relief but this has not been helpful. It prevented her from sleeping. She reported the tramadol is not helping with pain control. She reported she received a referral for a breast surgeon from the ED, but is unsure if she should make an appointment.  She reported she had or syncope. Past Medical History:   has a past medical history of Anemia, Anxiety, Heart abnormalities, Miscarriage, Ovarian cyst, and Tricuspid regurgitation. Past Surgical History:   has a past surgical history that includes Appendectomy (2006); Ovarian cyst surgery; Endometrial ablation (02/01/2017); Insertable Cardiac Monitor (05/24/2017); Hysterectomy; Cardiac catheterization (05/07/2021); and Hysterectomy, total abdominal.     Allergies:  Demerol and Procardia [nifedipine]    Social History:   reports that she has never smoked. She has never used smokeless tobacco. She reports that she does not drink alcohol and does not use drugs. Family History: family history includes Arthritis in her mother; Breast Cancer in her maternal grandmother; Cancer in her maternal grandmother and paternal grandmother; Mental Retardation in her maternal uncle. Home Medications:    Prior to Admission medications    Medication Sig Start Date End Date Taking? Authorizing Provider   gabapentin (NEURONTIN) 100 MG capsule Take 2 capsules by mouth 3 times daily for 30 days. 9/27/21 10/27/21 Yes Rc Aguayo MD   furosemide (LASIX) 20 MG tablet Take 1 tablet by mouth daily as needed (edema) 9/7/21  Yes Rc Aguayo MD   citalopram (CELEXA) 10 MG tablet Take 1 tablet by mouth nightly 9/3/21  Yes Rc Aguayo MD   verapamil (CALAN SR) 120 MG extended release tablet Take 1 tablet by mouth daily 5/27/21  Yes Radha Lawrence MD   acetaminophen (TYLENOL) 500 MG tablet Take 500 mg by mouth every 6 hours as needed for Pain   Yes Historical Provider, MD   nitroGLYCERIN (NITROSTAT) 0.3 MG SL tablet Place 1 tablet under the tongue every 5 minutes as needed for Chest pain up to max of 3 total doses.  If no relief after 1 dose, call 911. 5/14/21  Yes Radha Lawrence MD       REVIEW OF SYSTEMS:    All 14-point review of systems are completed and  pertinent positives are mentioned in the history of present illness. Other  systems are reviewed and are negative. Physical Examination:    /60   Pulse 91   Ht 5' 2\" (1.575 m)   Wt 207 lb (93.9 kg)   LMP 08/07/2017 Comment: hyst 08/2017  SpO2 99%   BMI 37.86 kg/m²      Constitutional and General Appearance:    alert, cooperative, no distress and appears stated age  [de-identified]:    PERRLA, no cervical lymphadenopathy. No masses palpable. Normal oral mucosa  Respiratory:  · Normal excursion and expansion without use of accessory muscles  · Resp Auscultation: Normal breath sounds without dullness or wheezing  Cardiovascular:  · The apical impulse is not displaced  · RRR S1S2 w/o M/G/R  Abdomen:  · No masses or tenderness  · Bowel sounds present  Extremities:  ·  No Cyanosis or Clubbing  ·  Lower extremity edema: No  · Skin: Warm and dry  Neurological:  · Alert and oriented. · Moves all extremities well  · No abnormalities of mood, affect, memory, mentation, or behavior are noted    DATA:      ECG 9/28/21: Personally reviewed. Stress test 5/5/2021  Summary There is a large area, moderate to severe, reversible perfusion defect  involving the anterior/anterolateral wall. FIndings are consistent with  inducible ischemia of the LAD territory. Hyperdynamic LV function with LVEF  > 70%. Overall findings represent a high risk scan. [facility] 5/7/2021  Impression:   1. Normal coronary arteries. 2. LVEDP 15 mmHg. Plan:   1. Optimal medical therapy for CAD risk factors. 2. Consider PFTs to evaluate for both obstructive and restrictive   lung disease. 3. It was recommend that she stop her keto diet and focus on   weight through a well-balanced heart healthy diet and regular   physical exercise for at least 30 minutes 3-5 times per week. 4. Follow-up with Ok Cazares in one month. Echo 4/9/2021  Summary   Limited study. Left ventricular cavity size is normal. Normal left   ventricular wall thickness.  Overall left ventricular systolic function appears normal with an ejection fraction of 55%. No regional wall motion   abnormalities are noted. Estimated pulmonary artery systolic pressure is at   31 mmHg assuming a right atrial pressure of 3 mmHg. IMPRESSION:    1. Syncope/presyncope.  03/16/2021  Patient is a pleasant 54-year-old female with a medical history significant for fibromyalgia, sinus tachycardia, and syncope/presyncope. She states that since the last time she saw us in 2018 she has been doing well until over the last 3 months when she began to suffer from recurrent symptoms. She previously was on metoprolol however discontinued as her symptoms improved in the past.  I am unclear as to what her symptoms are related to from a rhythm standpoint as her device shows no arrhythmias. I will asked that she wear a 30-day monitor and follow-up with us in clinic. I also asked that she follow-up with neurology as far as her migraines are concerned as this may be the true etiology of her symptoms.  - Thirty day monitor.  - Follow up with neurology. - Limited echocardiogram for edema.  - Follow up with me in a month. 04/13/2021  Patient underwent echocardiogram which showed normal cardiac function. Her monitor showed rare PACs and PVCs that were not symptomatic. No significant arrhythmias noted on her monitor. Patient did have presyncope and syncopal events. None of these occurred while she was wearing her heart monitor. From a symptom standpoint appears to be vagal in nature. I will asked that she repeat a tilt table test.  Given her chest pain I will asked that she have a stress test as well as she is post hysterectomy with bilateral oophorectomies.  -Schedule exercise stress test.  - Schedule tilt table testing.  - Continue current course but consider fludrocortisone or midodrine after tilt table test.     5/27/2021  Patient is here for follow-up post loop monitor explantation.   She continues to have some pain around her explantation site and just below where blister was unintentionally ruptured. We will give her some more pain medication some antibiotics to help with this. Will refer her to plastic surgery for evaluation. We will switch her from propranolol to verapamil for possible spasms. Does have some wrist discomfort bilaterally that is getting better post radial arterial cannulation. We will need to continue to monitor this. I will asked her to follow-up with me in 3 to 4 weeks. - Start doxycycline 100 mg BID. - Stop propanolol and switch to verapamil for spasms and palpitations. - Referral to breast surgery. - Follow up with me in 3-4 weeks. 6/24/2021  Patient presents for follow-up. As far as her breast concerned it has significantly improved and her scar appears to be healing well. Patient is a very in tune person with a strong mind-body connection, as given by her fibromyalgia. She has been under tremendous amount of stress and in my opinion this has contributed to her physical deterioration symptoms/body wise. She has had some improvement with her verapamil however due to her mental status and pressures I believe that her body is significantly impaired. I will refer her to a psychiatrist and I am hopeful that in combination with the medical therapy decide prescribed that she will continue to improve. I will ask her to follow-up with me in 3 months. Relationship to her overall. I will extend her time off.  - Psychiatry referral.  - Continue verapamil.  - Follow up in 4 weeks. 9/27/2021  Patient clinically is improved. She is now on an anxiolytic and her symptoms are significant improvement along with her verapamil. She can tell when she is not taking her verapamil. We discussed options including increasing verapamil and or starting her on nitroglycerin sublingual for spasms. We decided to increase her verapamil to 180.   If this does not improve her symptoms we will consider decreasing giving her hypotension and start her on some sublingual nitroglycerin while she is in a safe space given its response potentially triggering hypotension as well. - Increase verapamil to 180 mg daily.  - Follow up in 3 months. - Return this evening to fill out paper work for work. 2. Migraines/altered mental state. Etiology unclear. - Plan as per above. 3. Chest pain      4. Peripheral edema      5. Dizziness  4/13/2021  Ryan Whatley is here today for follow up for syncope/presyncope. AVTAR from 3/2021 showed sinus rhythm with tachycardia, very rare PAC's, and PVC's. At her last visit she was referred to neurology. She has had 4-5 episodes of near syncope in the last 2 weeks. Last episode she had chest pain that radiated into her arms. She has a history ovary removal and then her uterus a few years later due to endometriosis. She would like to have her ILR removed. She is of work until 4/22/2021 due to her symptoms.   - Plan as per above. 5/27/2021 - 09/24/2021  - Plan as per above. RECOMMENDATIONS:   1. Discussed options:    -increase verapamil to 180mg per day                OR    -Start SL nitroglycerin. 2. Call our office if notice a decrease in blood pressure, dizziness, or fatigue with increase in medication. If you do not tolerate the increase, we can try SL nitroglycerin. 3. Regarding back to work: We will fill out a new form for you. Come in today at 5PM with a blank form. 4. Follow up with me in 3 months. QUALITY MEASURES  1. Tobacco Cessation Counseling: NA  2. Retake of BP if >140/90:   NA  3. Documentation to PCP/referring for new patient:  Sent to PCP at close of office visit  4. CAD patient on anti-platelet: NA  5. CAD patient on STATIN therapy:  NA  6. Patient with CHF and aFib on anticoagulation:  NA     All questions and concerns were addressed to the patient/family. Alternatives to my treatment were discussed.     This note has been scribed in the presence of Jay Wahl MD, by Sheryle Dus [facility]. The scribe's documentation has been prepared under my direction and personally reviewed by me in its entirety. I confirm that the note above accurately reflects all work, physical examination, the discussion of treatments and procedures, and medical decision making performed by me. Arvel Dance, MD personally performed the services described in this documentation as scribed by nurse in my presence, and is both accurate and complete. Electronically signed by Shirley Mathur MD on 9/28/2021 at 10:50 AM     Dr. Jameson Ryan MD  Electrophysiology  [place] [address]. [address] [place] [address]. [address]. [facility] [address]  Phone: [phone]  Fax: [phone]     NOTE: This report was transcribed using voice recognition software. Every effort was made to ensure accuracy, however, inadvertent computerized transcription errors may be present.

## 2021-09-28 ENCOUNTER — OFFICE VISIT (OUTPATIENT)
Dept: CARDIOLOGY CLINIC | Age: 31
End: 2021-09-28
Payer: COMMERCIAL

## 2021-09-28 VITALS
OXYGEN SATURATION: 99 % | DIASTOLIC BLOOD PRESSURE: 60 MMHG | BODY MASS INDEX: 38.09 KG/M2 | HEIGHT: 62 IN | HEART RATE: 91 BPM | WEIGHT: 207 LBS | SYSTOLIC BLOOD PRESSURE: 100 MMHG

## 2021-09-28 DIAGNOSIS — R00.0 TACHYCARDIA: Primary | ICD-10-CM

## 2021-09-28 DIAGNOSIS — G90.A POTS (POSTURAL ORTHOSTATIC TACHYCARDIA SYNDROME): ICD-10-CM

## 2021-09-28 PROCEDURE — 93000 ELECTROCARDIOGRAM COMPLETE: CPT | Performed by: INTERNAL MEDICINE

## 2021-09-28 PROCEDURE — 99214 OFFICE O/P EST MOD 30 MIN: CPT | Performed by: INTERNAL MEDICINE

## 2021-09-28 NOTE — PATIENT INSTRUCTIONS
RECOMMENDATIONS:   1. Discussed options:    -increase verapamil to 180mg per day                OR    -Start SL nitroglycerin. 2. Call our office if notice a decrease in blood pressure, dizziness, or fatigue with increase in medication. If you do not tolerate the increase, we can try SL nitroglycerin. 3. Regarding back to work: We will fill out a new form for you. Come in today at 5PM with a blank form. 4. Follow up with me in 3 months.

## 2021-09-28 NOTE — LETTER
[address] Cardiology - [address] [facility] [address] [facility]  Phone: [phone]  Fax: [phone]    Dion Rankin MD    October 4, 2021     Tatiana Saldana, [facility] [place] [address]    Patient: Kiara Jimenez   MR Number: [id number]   YOB: 1990   Date of Visit: 9/28/2021       Dear Tatiana Saldana:    Thank you for referring Dorothea Gonzalez to me for evaluation/treatment. Below are the relevant portions of my assessment and plan of care. If you have questions, please do not hesitate to call me. I look forward to following Roselyn along with you.     Sincerely,      Dion Rankin MD

## 2021-09-29 ENCOUNTER — TELEPHONE (OUTPATIENT)
Dept: CARDIOLOGY CLINIC | Age: 31
End: 2021-09-29

## 2021-09-29 NOTE — LETTER
[address] Cardiology - [address]  Phone: [phone]  Fax: [phone]    Melita Lam MD     Re: Chely Hinson 1990 September 29, 2021    To whom it may concern,       Return to work date: October 4th, 2021. Restrictions and recommendations: Would recommend working from home for the next 6 months with frequent breaks (at least 5 breaks per day) due to recent and frequent medication changes for patient's diagnosis of POTS (postural orthostatic tachycardia syndrome). Patient gets dizzy frequently with position changes due to her POTS, and she will need an adjustment period of 6 months as she will likely need frequent medication adjustments to manage her symptoms. Ok to walk short distances (<25 feet at a time). No heavy lifting (no more than 5 pounds). 6 months also applies to restrictions mentioned above. This is as specific as we can be with patient's cardiac condition, restrictions and recommendations as flare-ups and treatments can be difficult to predict with POTS.      Sincerely,        Melita Lam MD

## 2021-09-29 NOTE — TELEPHONE ENCOUNTER
Needs to speak to Kushal Both regarding paperwork from yesterday. She needs something added to the existing letter.

## 2021-10-04 ENCOUNTER — OFFICE VISIT (OUTPATIENT)
Dept: INTERNAL MEDICINE CLINIC | Age: 31
End: 2021-10-04

## 2021-10-04 VITALS
SYSTOLIC BLOOD PRESSURE: 135 MMHG | HEIGHT: 62 IN | HEART RATE: 70 BPM | WEIGHT: 210 LBS | DIASTOLIC BLOOD PRESSURE: 80 MMHG | BODY MASS INDEX: 38.64 KG/M2 | RESPIRATION RATE: 18 BRPM

## 2021-10-04 DIAGNOSIS — M79.7 FIBROMYALGIA: Primary | ICD-10-CM

## 2021-10-04 DIAGNOSIS — R55 SYNCOPE, UNSPECIFIED SYNCOPE TYPE: ICD-10-CM

## 2021-10-04 DIAGNOSIS — F41.9 ANXIETY: ICD-10-CM

## 2021-10-04 PROCEDURE — 99212 OFFICE O/P EST SF 10 MIN: CPT | Performed by: INTERNAL MEDICINE

## 2021-10-04 RX ORDER — TRAMADOL HYDROCHLORIDE 50 MG/1
50 TABLET ORAL NIGHTLY PRN
Qty: 14 TABLET | Refills: 0 | Status: SHIPPED | OUTPATIENT
Start: 2021-10-04 | End: 2021-10-18 | Stop reason: SDUPTHER

## 2021-10-04 RX ORDER — MILNACIPRAN HCL 12.5 MG
12.5 TABLET ORAL 2 TIMES DAILY
Qty: 60 TABLET | Refills: 0 | Status: SHIPPED | OUTPATIENT
Start: 2021-10-04 | End: 2021-12-07 | Stop reason: SDUPTHER

## 2021-10-04 RX ORDER — CITALOPRAM 10 MG/1
10 TABLET ORAL NIGHTLY
Qty: 30 TABLET | Refills: 5 | Status: SHIPPED | OUTPATIENT
Start: 2021-10-04 | End: 2021-12-07 | Stop reason: SDUPTHER

## 2021-10-04 NOTE — PROGRESS NOTES
Janell Pascal (:  1990) is a 27 y.o. female,Established patient, here for evaluation of the following chief complaint(s):  No chief complaint on file. SUBJECTIVE/OBJECTIVE:    HPI    27 y.o. female with hx of fibromyalgia here for regular f.w    Previous hx    syncopal event at work leading to cardiology consultation, had previous loop recorder showing sinus tachycardia with PAC but no abnormal rhythm. Had abnormal stress test and normal angiogram. Tilt table study was normal. Placed on verapamil by EP and reports no further syncope but has presyncopal events  Thought these events could be psychiatric in nature with increased stress . Currently seeing a counsellor  Was started on celexa by us and reports it is helping some  Recently resumed back work and also her verapmil is increased now to 180 mg     Fibromyalgia - Continues to report multiple somatic issues - low back pain ,bilateral feet pain which are progressive   Today reports having pain in bottom of foot, pedal edema, unable to walk much, using some inserts which helps  Reports gabapentin has helped some     Had multiple testing for lumbar spine and hip pain with no etiology found  Reports no nsaids have worked - tried [employer], diclofenac, celebrex, ibuprofen with no benefit         Working out to lose [employer]-     Lives with 3 children and     Allergies   Allergen Reactions    Demerol Other (See Comments)     Hallucinations, nausea, aka MEPERIDINE    Procardia [Nifedipine] Rash     Current Outpatient Medications   Medication Sig Dispense Refill    verapamil (CALAN SR) 180 MG extended release tablet Take 1 tablet by mouth daily 30 tablet 5    gabapentin (NEURONTIN) 100 MG capsule Take 2 capsules by mouth 3 times daily for 30 days.  180 capsule 0    furosemide (LASIX) 20 MG tablet Take 1 tablet by mouth daily as needed (edema) 30 tablet 0    citalopram (CELEXA) 10 MG tablet Take 1 tablet by mouth nightly 30 tablet 0    -5/7/2021    1. Normal coronary arteries. 2. LVEDP 15 mmHg. Diagnosis Orders   1. Fibromyalgia  traMADol (ULTRAM) 50 MG tablet   2. Anxiety     3. Syncope, unspecified syncope type         Recurrent presyncope /syncope - extensive workup as above neg  Stress test positive but cath neg  Tilt table testing neg  tsh wnl  Echo wnl   Off work for now - on verapamil 120 mg daily = recently increased to 180 mg   Possibly related to anxiety . Now seeing therapist    Anxiety - now f/w psychiatrist . Recently we started on celexa 10 mg daily and it is helping     Fibromyalgia - multiple somatic complaints - previous investigations have been neg - mulitple meds did not help  - trial of celebrex recently with no relief  - changed to relafen , did not help  - add foot inserts , consider to see podiatry  - added gabapentin now at 500 mg daily on divided doses -   - trial of tramadol at night  - plan for savella        Obesity noted - weight loss and life style modification along with dietary changes, increased physical activity encouraged    Pedal edema- not noted today. Has lasix to take prn     menpausal symptoms - post RAMSEY, on estrogen supplements      Recommend covid vaccine         An electronic signature was used to authenticate this note.     --Rashida Loja MD

## 2021-10-13 ENCOUNTER — TELEPHONE (OUTPATIENT)
Dept: INTERNAL MEDICINE CLINIC | Age: 31
End: 2021-10-13

## 2021-10-13 NOTE — TELEPHONE ENCOUNTER
----- Message from Scott Bacon MD sent at 10/13/2021  8:20 AM EDT -----  Contact: [phone] (H)  Ok to take but different times  ----- Message -----  From: Angel Broderick MA  Sent: 10/12/2021   2:50 PM EDT  To: Scott Bacon MD    Pt wants to know if she should be taking the estrogens, conjugated, (PREMARIN) 1.25 MG tablet with the citalopram (CELEXA) 10 MG tablet the pharmacist told her she should not take them together and wants to know if she needs to stop the Celexa.

## 2021-10-13 NOTE — TELEPHONE ENCOUNTER
----- Message from David Skaggs MD sent at 10/13/2021  1:29 PM EDT -----  Contact: [phone] (H)  Yes  ----- Message -----  From: Jean Leyva  Sent: 10/13/2021  10:11 AM EDT  To: David Skaggs MD    Pt wanted to know if she could take the [facility] and Celexa together. Please advise.  ----- Message -----  From: Melvin Irvin  Sent: 10/13/2021   9:54 AM EDT  To: Melvin Irvin      ----- Message -----  From: David Skaggs MD  Sent: 10/13/2021   8:20 AM EDT  To: Ken Zamora to take but different times  ----- Message -----  From: Hugh Marques MA  Sent: 10/12/2021   2:50 PM EDT  To: David Skaggs MD    Pt wants to know if she should be taking the estrogens, conjugated, (PREMARIN) 1.25 MG tablet with the citalopram (CELEXA) 10 MG tablet the pharmacist told her she should not take them together and wants to know if she needs to stop the Celexa.

## 2021-10-18 DIAGNOSIS — M79.7 FIBROMYALGIA: ICD-10-CM

## 2021-10-19 RX ORDER — TRAMADOL HYDROCHLORIDE 50 MG/1
50 TABLET ORAL NIGHTLY PRN
Qty: 30 TABLET | Refills: 0 | Status: SHIPPED | OUTPATIENT
Start: 2021-10-19 | End: 2021-11-17 | Stop reason: SDUPTHER

## 2021-10-19 NOTE — TELEPHONE ENCOUNTER
----- Message from Pantera Bear sent at 10/19/2021  8:12 AM EDT -----  Contact: Lulú Mack   [phone]  Patient states that when she went to  her medication that the pharmacy filled it to her old Rx. That they filled it for the 0.625 mg. And  Increased it to 1.25 mg.  So she is wanting to have the Rx filled to the right one.         estrogens, conjugated, (PREMARIN) 1.25 MG tablet Take 1 tablet by mouth daily                   [address] [place] [address] [address] [phone]

## 2021-10-25 RX ORDER — GABAPENTIN 100 MG/1
CAPSULE ORAL
Qty: 180 CAPSULE | Refills: 0 | Status: SHIPPED | OUTPATIENT
Start: 2021-10-27 | End: 2021-12-07 | Stop reason: SDUPTHER

## 2021-10-29 RX ORDER — FUROSEMIDE 20 MG/1
20 TABLET ORAL DAILY PRN
Qty: 30 TABLET | Refills: 0 | Status: SHIPPED | OUTPATIENT
Start: 2021-10-29 | End: 2021-12-07 | Stop reason: SDUPTHER

## 2021-11-07 ENCOUNTER — APPOINTMENT (OUTPATIENT)
Dept: GENERAL RADIOLOGY | Age: 31
End: 2021-11-07
Payer: COMMERCIAL

## 2021-11-07 ENCOUNTER — HOSPITAL ENCOUNTER (EMERGENCY)
Age: 31
Discharge: HOME | End: 2021-11-07
Payer: COMMERCIAL

## 2021-11-07 VITALS
SYSTOLIC BLOOD PRESSURE: 135 MMHG | RESPIRATION RATE: 18 BRPM | HEART RATE: 97 BPM | WEIGHT: 196 LBS | TEMPERATURE: 97.8 F | DIASTOLIC BLOOD PRESSURE: 81 MMHG | OXYGEN SATURATION: 100 % | BODY MASS INDEX: 37 KG/M2 | HEIGHT: 61 IN

## 2021-11-07 DIAGNOSIS — S93.402A MODERATE LEFT ANKLE SPRAIN, INITIAL ENCOUNTER: Primary | ICD-10-CM

## 2021-11-07 PROCEDURE — 99285 EMERGENCY DEPT VISIT HI MDM: CPT

## 2021-11-07 PROCEDURE — 6370000000 HC RX 637 (ALT 250 FOR IP): Performed by: PHYSICIAN ASSISTANT

## 2021-11-07 PROCEDURE — 73630 X-RAY EXAM OF FOOT: CPT

## 2021-11-07 PROCEDURE — 73590 X-RAY EXAM OF LOWER LEG: CPT

## 2021-11-07 PROCEDURE — 73610 X-RAY EXAM OF ANKLE: CPT

## 2021-11-07 RX ORDER — ACETAMINOPHEN 325 MG/1
650 TABLET ORAL ONCE
Status: COMPLETED | OUTPATIENT
Start: 2021-11-07 | End: 2021-11-07

## 2021-11-07 RX ORDER — IBUPROFEN 600 MG/1
600 TABLET ORAL ONCE
Status: COMPLETED | OUTPATIENT
Start: 2021-11-07 | End: 2021-11-07

## 2021-11-07 RX ADMIN — ACETAMINOPHEN 650 MG: 325 TABLET ORAL at 15:22

## 2021-11-07 RX ADMIN — IBUPROFEN 600 MG: 600 TABLET, FILM COATED ORAL at 15:22

## 2021-11-07 ASSESSMENT — PAIN DESCRIPTION - PAIN TYPE: TYPE: ACUTE PAIN

## 2021-11-07 ASSESSMENT — PAIN DESCRIPTION - DESCRIPTORS: DESCRIPTORS: RADIATING;SHARP

## 2021-11-07 ASSESSMENT — ENCOUNTER SYMPTOMS: COLOR CHANGE: 0

## 2021-11-07 ASSESSMENT — PAIN SCALES - GENERAL
PAINLEVEL_OUTOF10: 7
PAINLEVEL_OUTOF10: 4

## 2021-11-07 ASSESSMENT — PAIN DESCRIPTION - FREQUENCY: FREQUENCY: CONTINUOUS

## 2021-11-07 ASSESSMENT — PAIN DESCRIPTION - ONSET: ONSET: ON-GOING

## 2021-11-07 ASSESSMENT — PAIN DESCRIPTION - ORIENTATION: ORIENTATION: LEFT
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
## 2021-11-07 NOTE — ED NOTES
Discharge instructions and medications reviewed with patient. Patient verbalized understanding of medications and follow up. All questions answered at this time. Skin warm, pink, and dry. Patient alert and oriented x4. Pt ambulated to lobby with a stable gait. Patient discharged home with 0 prescriptions.       Bushra Jaquez RN  11/07/21 4007

## 2021-11-08 ENCOUNTER — TELEPHONE (OUTPATIENT)
Dept: CARDIOLOGY CLINIC | Age: 31
End: 2021-11-08

## 2021-11-08 DIAGNOSIS — R00.2 PALPITATION: Primary | ICD-10-CM
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
## 2021-11-08 NOTE — TELEPHONE ENCOUNTER
Pt called to let [address] [facility],[address] know that she had forgotten to take her Verapamil 180 mg once daily for several days. Pt stated this was because her mother in law passed away and pt has been under a lot of stress. Pt stated on Thursday 11/4 HR got as low as 30 BPM. When HR gets that low pt is light headed and feels like she is going to pass out. Pt stated Rola Lamas is trying to remember to take her medication\". Please call pt with AGK'S recommendations. Pt can be reached @ [phone].

## 2021-11-09 ENCOUNTER — TELEPHONE (OUTPATIENT)
Dept: CARDIOLOGY CLINIC | Age: 31
End: 2021-11-09

## 2021-11-09 NOTE — TELEPHONE ENCOUNTER
Monitor placed by N/A  Monitor company [employer]  Length of monitor 14 DAY  Monitor ordered by [address] [facility],[address]   Serial number N/A  Kit ID [id number]  Activation successful prior to pt leaving office? NO    [employer] monitor has been sent to pt's home.

## 2021-11-17 DIAGNOSIS — M79.7 FIBROMYALGIA: ICD-10-CM

## 2021-11-17 RX ORDER — TRAMADOL HYDROCHLORIDE 50 MG/1
50 TABLET ORAL NIGHTLY PRN
Qty: 30 TABLET | Refills: 0 | Status: SHIPPED | OUTPATIENT
Start: 2021-11-18 | End: 2021-12-07 | Stop reason: SDUPTHER

## 2021-11-17 NOTE — TELEPHONE ENCOUNTER
----- Message from Ardyce Cheadle sent at 11/17/2021  3:48 PM EST -----  Contact: Janay Kennedy  [phone]  Patient needs a refill    traMADol (ULTRAM) 50 MG tablet Take 1 tablet by mouth nightly as needed for Pain for up to 30 days.

## 2021-11-19 NOTE — TELEPHONE ENCOUNTER
Tried to reach pt per AGK's message regarding holding her Verapamil. No answer and not able to leave a VM.     TY

## 2021-11-19 NOTE — TELEPHONE ENCOUNTER
[address] please advise. Pt's last OV 9/28/21. Per last OV Pt is supposed to be taking Verapamil 180mg daily. Pt can be reached at 21 [phone].       TY

## 2021-11-19 NOTE — TELEPHONE ENCOUNTER
Pt has not been taking the Verapamil and has been having \"spazams\". Pt states during these episodes she experiences CP that radiates throughout her body. Pt does not want to take the Verapamil because she is afraid it will drop her HR to much. Pt did state she did not have near as many episodes on the Verapamil. Pt wants to know if the dose can be lowered? Or if there is an alternative medication she can take that wont drop her HR. Also, pt is going to start wearing her 2 week monitor today.

## 2021-11-21 ENCOUNTER — APPOINTMENT (OUTPATIENT)
Dept: GENERAL RADIOLOGY | Age: 31
End: 2021-11-21
Payer: COMMERCIAL

## 2021-11-21 ENCOUNTER — HOSPITAL ENCOUNTER (EMERGENCY)
Age: 31
Discharge: HOME | End: 2021-11-21
Attending: EMERGENCY MEDICINE
Payer: COMMERCIAL

## 2021-11-21 VITALS
HEIGHT: 62 IN | HEART RATE: 78 BPM | OXYGEN SATURATION: 98 % | SYSTOLIC BLOOD PRESSURE: 128 MMHG | BODY MASS INDEX: 34.96 KG/M2 | TEMPERATURE: 98.4 F | DIASTOLIC BLOOD PRESSURE: 74 MMHG | WEIGHT: 190 LBS | RESPIRATION RATE: 14 BRPM

## 2021-11-21 DIAGNOSIS — S90.31XA CONTUSION OF RIGHT FOOT, INITIAL ENCOUNTER: Primary | ICD-10-CM

## 2021-11-21 PROCEDURE — 6370000000 HC RX 637 (ALT 250 FOR IP): Performed by: EMERGENCY MEDICINE

## 2021-11-21 PROCEDURE — 73630 X-RAY EXAM OF FOOT: CPT

## 2021-11-21 PROCEDURE — 99284 EMERGENCY DEPT VISIT MOD MDM: CPT

## 2021-11-21 RX ORDER — IBUPROFEN 600 MG/1
600 TABLET ORAL ONCE
Status: COMPLETED | OUTPATIENT
Start: 2021-11-21 | End: 2021-11-21

## 2021-11-21 RX ORDER — ACETAMINOPHEN 325 MG/1
650 TABLET ORAL ONCE
Status: COMPLETED | OUTPATIENT
Start: 2021-11-21 | End: 2021-11-21

## 2021-11-21 RX ADMIN — IBUPROFEN 600 MG: 600 TABLET ORAL at 10:00

## 2021-11-21 RX ADMIN — ACETAMINOPHEN 650 MG: 325 TABLET ORAL at 10:00

## 2021-11-21 ASSESSMENT — ENCOUNTER SYMPTOMS
SHORTNESS OF BREATH: 0
ABDOMINAL PAIN: 0
BACK PAIN: 0

## 2021-11-21 ASSESSMENT — PAIN SCALES - GENERAL: PAINLEVEL_OUTOF10: 4

## 2021-11-21 NOTE — ED PROVIDER NOTES
[address] [facility] Name: Mercedes Little  MRN: [id number]  [place] 1990  Date of evaluation: 11/21/2021  Provider: Zulema Montejo MD    CHIEF COMPLAINT       Chief Complaint   Patient presents with    Foot Pain     Dropped a couple of frozen water bottles from the freezer onto her right toes about 30 minutes ago. Has had pain since. HISTORY OF PRESENT ILLNESS   (Location/Symptom, Timing/Onset, Context/Setting, Quality, Duration, Modifying Factors, Severity)  Note limiting factors. Mercedes Little is a 27 y.o. female who presents to the emergency department     Patient had 2 frozen apparently drinks fall directly on her toes of the right foot over the second third and fourth no open lesions occurred this occurred about 40 minutes pain is about 8-9 over 10 patient has limited range of motion due to the pain  Patient denies any other injuries  She has not taken anything yet for the pain    The history is provided by the patient and the spouse. Nursing Notes were reviewed. REVIEW OF SYSTEMS    (2-9 systems for level 4, 10 or more for level 5)     Review of Systems   Constitutional: Positive for activity change. Negative for chills and fever. Respiratory: Negative for shortness of breath. Cardiovascular: Negative for chest pain. Gastrointestinal: Negative for abdominal pain. Musculoskeletal: Positive for arthralgias and gait problem. Negative for back pain and neck pain. Neurological: Negative for dizziness. Psychiatric/Behavioral: Negative for behavioral problems. All other systems reviewed and are negative. Except as noted above the remainder of the review of systems was reviewed and negative.        PAST MEDICAL HISTORY     Past Medical History:   Diagnosis Date    Anemia     Currently on Iron PO    Anxiety     Heart abnormalities     \"heart flutters real fast\"    Miscarriage 2009    Ovarian cyst     Tricuspid regurgitation          SURGICAL HISTORY       Past Surgical History:   Procedure Laterality Date    APPENDECTOMY  2006    CARDIAC CATHETERIZATION  05/07/2021    WNL    ENDOMETRIAL ABLATION  02/01/2017    With Tubal ligation    HYSTERECTOMY      HYSTERECTOMY, TOTAL ABDOMINAL      INSERTABLE CARDIAC MONITOR  05/24/2017    Loop Recorder Left Chest    OVARIAN CYST SURGERY           CURRENT MEDICATIONS       Discharge Medication List as of 11/21/2021 10:23 AM      CONTINUE these medications which have NOT CHANGED    Details   traMADol (ULTRAM) 50 MG tablet Take 1 tablet by mouth nightly as needed for Pain for up to 30 days. , Disp-30 tablet, R-0Normal      furosemide (LASIX) 20 MG tablet Take 1 tablet by mouth daily as needed (edema), Disp-30 tablet, R-0Normal      gabapentin (NEURONTIN) 100 MG capsule TAKE TWO CAPSULES BY MOUTH THREE TIMES A DAY, Disp-180 capsule, R-0Normal      estrogens, conjugated, (PREMARIN) 1.25 MG tablet Take 1 tablet by mouth daily, Disp-30 tablet, R-0Normal      citalopram (CELEXA) 10 MG tablet Take 1 tablet by mouth nightly, Disp-30 tablet, R-5Normal      milnacipran HCl (SAVELLA) 12.5 MG TABS Take 1 tablet by mouth 2 times daily, Disp-60 tablet, R-0Normal      acetaminophen (TYLENOL) 500 MG tablet Take 500 mg by mouth every 6 hours as needed for PainHistorical Med      nitroGLYCERIN (NITROSTAT) 0.3 MG SL tablet Place 1 tablet under the tongue every 5 minutes as needed for Chest pain up to max of 3 total doses.  If no relief after 1 dose, call 911., Disp-30 tablet, R-3Normal             ALLERGIES     Demerol and Procardia [nifedipine]    FAMILY HISTORY       Family History   Problem Relation Age of Onset    Cancer Maternal Grandmother         Thyroid    Breast Cancer Maternal Grandmother     Cancer Paternal Grandmother     Arthritis Mother     Mental Retardation Maternal Uncle           SOCIAL HISTORY       Social History     Socioeconomic History    Marital status:  Spouse name: None    Number of children: None    Years of education: None    Highest education level: None   Occupational History    None   Tobacco Use    Smoking status: Never Smoker    Smokeless tobacco: Never Used   Vaping Use    Vaping Use: Never used   Substance and Sexual Activity    Alcohol use: No    Drug use: No    Sexual activity: Yes     Partners: Male   Other Topics Concern    None   Social History Narrative    None     Social Determinants of Health     Financial Resource Strain:     Difficulty of Paying Living Expenses: Not on file   Food Insecurity:     Worried About Running Out of Food in the Last Year: Not on file    Víctor of Food in the Last Year: Not on file   Transportation Needs:     Lack of Transportation (Medical): Not on file    Lack of Transportation (Non-Medical):  Not on file   Physical Activity:     Days of Exercise per Week: Not on file    Minutes of Exercise per Session: Not on file   Stress:     Feeling of Stress : Not on file   Social Connections:     Frequency of Communication with Friends and Family: Not on file    Frequency of Social Gatherings with Friends and Family: Not on file    Attends [employer]regation Services: Not on file    Active Member of [address] [place] [address] or Organizations: Not on file    Attends Club or Organization Meetings: Not on file    Marital Status: Not on file   Intimate Partner Violence:     Fear of Current or Ex-Partner: Not on file    Emotionally Abused: Not on file    Physically Abused: Not on file    Sexually Abused: Not on file   Housing Stability:     Unable to Pay for Housing in the Last Year: Not on file    Number of [place] in the Last Year: Not on file    Unstable Housing in the Last Year: Not on file       SCREENINGS               PHYSICAL EXAM    (up to 7 for level 4, 8 or more for level 5)     ED Triage Vitals [11/21/21 0934]   BP Temp Temp Source Pulse Resp SpO2 Height Weight   137/78 98.4 °F (36.9 °C) Oral 87 16 96 % 5' 2\" (1.575 m) 190 lb (86.2 kg)       Physical Exam  Vitals and nursing note reviewed. Constitutional:       General: She is not in acute distress. Appearance: Normal appearance. She is well-developed. She is not diaphoretic. HENT:      Head: Normocephalic. Eyes:      Conjunctiva/sclera: Conjunctivae normal.      Pupils: Pupils are equal, round, and reactive to light. Neck:      Thyroid: No thyromegaly. Cardiovascular:      Rate and Rhythm: Normal rate and regular rhythm. Heart sounds: Normal heart sounds. No murmur heard. No friction rub. No gallop. Pulmonary:      Effort: Pulmonary effort is normal. No respiratory distress. Breath sounds: Normal breath sounds. Abdominal:      General: Bowel sounds are normal. There is no distension. Palpations: Abdomen is soft. Tenderness: There is no abdominal tenderness. Musculoskeletal:         General: Tenderness and signs of injury present. Cervical back: Normal range of motion and neck supple. Right foot: Decreased range of motion. Tenderness and bony tenderness present. Legs:    Neurological:      Mental Status: She is alert and oriented to person, place, and time. GCS: GCS eye subscore is 4. GCS verbal subscore is 5. GCS motor subscore is 6. Cranial Nerves: No cranial nerve deficit. Sensory: No sensory deficit. Motor: No abnormal muscle tone.       Coordination: Coordination normal.      Deep Tendon Reflexes: Reflexes normal.   Psychiatric:         Behavior: Behavior normal.         DIAGNOSTIC RESULTS     EKG: All EKG's are interpreted by the Emergency Department Physician who either signs or Co-signs this chart in the absence of a cardiologist.        RADIOLOGY:   Non-plain film images such as CT, Ultrasound and MRI are read by the radiologist. Plain radiographic images are visualized and preliminarily interpreted by the emergency physician with the below findings:        Interpretation per the Radiologist below, if available at the time of this note:    XR FOOT RIGHT (MIN 3 VIEWS)   Final Result   No acute osseous injury of the right foot. LABS:  No results found for this visit on 11/21/21. EMERGENCY DEPARTMENT COURSE and DIFFERENTIAL DIAGNOSIS/MDM:     Vitals:    11/21/21 0934 11/21/21 1041   BP: 137/78 128/74   Pulse: 87 78   Resp: 16 14   Temp: 98.4 °F (36.9 °C)    TempSrc: Oral    SpO2: 96% 98%   Weight: 190 lb (86.2 kg)    Height: 5' 2\" (1.575 m)            MDM      REASSESSMENT          CRITICAL CARE TIME     CONSULTS:  None      PROCEDURES:     Procedures    MEDICATIONS GIVEN THIS VISIT:  Medications   ibuprofen (ADVIL;MOTRIN) tablet 600 mg (600 mg Oral Given 11/21/21 1000)   acetaminophen (TYLENOL) tablet 650 mg (650 mg Oral Given 11/21/21 1000)        FINAL IMPRESSION      1. Contusion of right foot, initial encounter            DISPOSITION/PLAN   DISPOSITION Decision To Discharge 11/21/2021 09:59:15 AM      PATIENT REFERRED TO:  Mikhail Salgado [address] [place] [address]  [phone]      As needed      DISCHARGE MEDICATIONS:  Discharge Medication List as of 11/21/2021 10:23 AM          Controlled Substances Monitoring  No flowsheet data found. (Please note that portions of this note were completed with a voice recognition program.  Efforts were made to edit the dictations but occasionally words are mis-transcribed.)    Patient was advised to return to the Emergency Department if there was any worsening.     Dillon Cortez MD (electronically signed)  Attending Emergency Physician         Ashley Hernandez MD  11/29/21 7160

## 2021-11-21 NOTE — ED NOTES
AVS provided and reviewed with the patient. The patient verbalized understanding of care at home, follow up care, and emergent symptoms to return for. No questions or concerns verbalized at this time. The patient is alert, oriented, stable, and ambulatory out of the department at the time of discharge.        Edy Clark RN  11/21/21 1041

## 2021-11-24 NOTE — TELEPHONE ENCOUNTER
Spoke to pt. She has not taken her Verapamil since 11/7/21, and pt is still having symptoms of cardiac spasms. Pt's HR ranges from 50 - 120 bpm via fit bit. I asked pt if she was wearing her heart monitor. Pt said she completely forgot about it. However, Pt assured me she would put the monitor on today, and let us know if any of her symptoms change. [address] please advise.  Pt V/U.    TY

## 2021-11-29 PROCEDURE — 93228 REMOTE 30 DAY ECG REV/REPORT: CPT | Performed by: INTERNAL MEDICINE

## 2021-12-02 ENCOUNTER — TELEPHONE (OUTPATIENT)
Dept: CARDIOLOGY CLINIC | Age: 31
End: 2021-12-02

## 2021-12-02 NOTE — TELEPHONE ENCOUNTER
LVM for patient to reschedule her appointment for today (12/2/2021). She is currently wearing monitor and need to reschedule for after monitor is resulted.      If patient calls back, SEAN has availability on 12/14/2021 at 4:00 PM, thanks

## 2021-12-07 DIAGNOSIS — M79.7 FIBROMYALGIA: ICD-10-CM

## 2021-12-07 RX ORDER — GABAPENTIN 100 MG/1
200 CAPSULE ORAL 3 TIMES DAILY
Qty: 180 CAPSULE | Refills: 0 | Status: SHIPPED | OUTPATIENT
Start: 2021-12-07 | End: 2022-01-11 | Stop reason: SDUPTHER

## 2021-12-07 RX ORDER — MILNACIPRAN HCL 12.5 MG
12.5 TABLET ORAL 2 TIMES DAILY
Qty: 60 TABLET | Refills: 0 | Status: SHIPPED | OUTPATIENT
Start: 2021-12-07 | End: 2022-01-11 | Stop reason: SDUPTHER

## 2021-12-07 RX ORDER — FUROSEMIDE 20 MG/1
20 TABLET ORAL DAILY PRN
Qty: 30 TABLET | Refills: 0 | Status: SHIPPED | OUTPATIENT
Start: 2021-12-07 | End: 2022-01-11 | Stop reason: SDUPTHER

## 2021-12-07 RX ORDER — TRAMADOL HYDROCHLORIDE 50 MG/1
50 TABLET ORAL NIGHTLY PRN
Qty: 30 TABLET | Refills: 0 | Status: SHIPPED | OUTPATIENT
Start: 2021-12-17 | End: 2022-01-11 | Stop reason: SDUPTHER

## 2021-12-07 RX ORDER — CITALOPRAM 10 MG/1
10 TABLET ORAL NIGHTLY
Qty: 30 TABLET | Refills: 5 | Status: SHIPPED | OUTPATIENT
Start: 2021-12-07 | End: 2022-01-11 | Stop reason: SDUPTHER

## 2021-12-13 PROCEDURE — 93272 ECG/REVIEW INTERPRET ONLY: CPT | Performed by: INTERNAL MEDICINE

## 2021-12-14 ENCOUNTER — OFFICE VISIT (OUTPATIENT)
Dept: CARDIOLOGY CLINIC | Age: 31
End: 2021-12-14
Payer: COMMERCIAL

## 2021-12-14 VITALS
HEART RATE: 96 BPM | OXYGEN SATURATION: 99 % | BODY MASS INDEX: 37.81 KG/M2 | SYSTOLIC BLOOD PRESSURE: 116 MMHG | WEIGHT: 205.5 LBS | HEIGHT: 62 IN | DIASTOLIC BLOOD PRESSURE: 78 MMHG

## 2021-12-14 DIAGNOSIS — R00.0 TACHYCARDIA: ICD-10-CM

## 2021-12-14 DIAGNOSIS — R00.2 PALPITATION: Primary | ICD-10-CM

## 2021-12-14 DIAGNOSIS — R55 SYNCOPE, UNSPECIFIED SYNCOPE TYPE: ICD-10-CM

## 2021-12-14 DIAGNOSIS — G90.A POTS (POSTURAL ORTHOSTATIC TACHYCARDIA SYNDROME): ICD-10-CM

## 2021-12-14 PROCEDURE — 99214 OFFICE O/P EST MOD 30 MIN: CPT | Performed by: INTERNAL MEDICINE

## 2021-12-14 PROCEDURE — 93000 ELECTROCARDIOGRAM COMPLETE: CPT | Performed by: INTERNAL MEDICINE

## 2021-12-14 RX ORDER — FLECAINIDE ACETATE 50 MG/1
50 TABLET ORAL 2 TIMES DAILY
Qty: 60 TABLET | Refills: 3 | Status: SHIPPED | OUTPATIENT
Start: 2021-12-14 | End: 2022-01-11 | Stop reason: SDUPTHER

## 2021-12-14 NOTE — LETTER
Antonio Bethea  1990    [facility]   Electrophysiology Consult Note            Date: 12/14/21  Patient Name: Antonio Bethea  YOB: 1990    Primary Care Physician: Jori Iyer MD    CHIEF COMPLAINT:   Chief Complaint   Patient presents with    3 Month Follow-Up    Palpitations     HISTORY OF PRESENT ILLNESS: Antonio Bethea is a 27 y.o. female with a PMH of previous syncope/tachycardia s/p ILR 5/24/2017, chronic migraine headache, fibromyalgia, adenomyosis status post hysterectomy and salpingo-oophorectomy who presented to the ED on 3/2/2021 with concerns of fatigue. She reports she was told all her testing was normal and returned to work. She reports she felt very tired and was experiencing palpitations with associated chest pain. She reports she did not remember anything that happened after that, but she was told she was laying on the floor and had missed 12 work phone calls. On 3/16/2021, her ECG demonstrates SR 84 BPM. She reports she does not have much notice prior to her syncopal episodes. She reported that her syncopal episodes are happening more frequently. She does notice she usually has HA prior to episodes and then she will experience tunnel vision prior to losing consciousness. She reports that if she does feel it coming on, she lays down and tried to rest, which typically helps resolve the issue. She reports she has seen a neurologist in the past for syncope but does not recall what what said. She reports that she experiences edema in her extremities and around her eyes. She reports she was unable to metoprolol due to fatigue and bradycardia. She reports she has been taking gabapentin for about 8 months. She reports she stopped taking it a few days ago to see if this would make a difference, and her syncopal episodes have persisted. She reports she tried to stop her premarin for several days which did not help her syncopal episodes either.  Patient denies current SOB. She wore a AVTAR from 3/16-3/26/2021 which showed an average heart rate of 86, lowest 58, highest 168 bpm, sinus rhythm with tachycardia, very rare PAC's, and PVC's. at her last visit, she was referred to neurology. Today 4/13/2021, she she states she had bot been feeling well and has had 4-5 episodes of almost passing out. With one spell she was outside with her sister in law and was helping with landscaping. She started to feel strange and her sister in law voice sounded like in was at a distance. She had to lay on the ground in order not to pas out. 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 During this spell she had chest pain that radiated into her arms. She has been staying well hydrated but is also trying to loose weight. She states her weight has gone up 4 lbs (207-211) and has foot swelling. She reports having issues with low heart rates in the past on beta blockers. She will be seeing neurology this month. Patient underwent a stress test 5/5/2021 that showed ischemia. She underwent a LHC 5/7/2021 that did not show any evidence of CAD. On 5/14/2021, patient underwent a tilt table test study (normal physiologic response) and ILR removal 5/27/2021. ILR removal was complicated by ILR migrating to under fibrous breat tissues. Patient required sedation to aid with removal. Cellulitis post operatively requiring antibiotics and frequent post op site checks. On 5/27/2021, patient's ECG demonstrated SR 65 BPM. She reported her ILR site is still painful. She described the pain as \"very deep\". She had tried heat and ice for pain relief but this has not been helpful. It prevented her from sleeping. She reported the tramadol is not helping with pain control. She reported she received a referral for a breast surgeon from the ED, but is unsure if she should make an appointment.  She reported she had been waking up in the middle of the night with chest \"spasms\" and took her pulse while she was still laying down and her HR was 36 BPM. This resolved with activity. She reported that she feels her palpitations when her HR is very low or very high. She reported that she gets SOB with exertion when she walks more than 20 feet at a time. She reported she has been experiencing numbness, coldness and tingling  in both hands. At patient's visit 6/24/21, ECG demonstrates SR (75). She presented feeling very dizzy and feeling like her heart is racing, she appears diaphoretic. She stated that she has been having these dizzy spells on and of for about a week now. She stated that these episodes seem to have been exacerbated with a great deal of family stress at home. She stated she has been drinking 4-7, 16 oz bottles of water per day on average. She stated that she thinks her medication is helping with her chest pain and it has decreased her chest pain episodes. She stated her ILR site is healing well. ECG demonstrates 9/28/2021 SR 79 BPM. She reports her ILR explant site is healing well and is pain free. She reports her heart races when she gets up and walks around and it has been stressful for her. She reports that she experiences heart spasms that can last up to 20 minutes at a time. She is also very stressed at work. Her  feels that some of her stress is related to short term disability/FMLA. She was recently diagnosed with PTSD and anxiety and was started on Celexa and feels this has been somewhat helpful. She is also seeing a therapist. She reports things are going well at [employer]. She reports that she can tell if she misses a dose of verapamil. She feels the verapamil is helpful for her tachycardia and heart spasms. Interval History since last office visit: Today, 12/14/2021, ECG demonstrates SR (91). She states that she has had a lot of stress lately and her mother in law passed away, She states she has had several low HR's.  She states that she is taking her medications as prescribed. Patient denies current edema, chest pain, sob, palpitations, dizziness or syncope. Past Medical History:   has a past medical history of Anemia, Anxiety, Heart abnormalities, Miscarriage, Ovarian cyst, and Tricuspid regurgitation. Past Surgical History:   has a past surgical history that includes Appendectomy (2006); Ovarian cyst surgery; Endometrial ablation (02/01/2017); Insertable Cardiac Monitor (05/24/2017); Hysterectomy; Cardiac catheterization (05/07/2021); and Hysterectomy, total abdominal.     Allergies:  Demerol and Procardia [nifedipine]    Social History:   reports that she has never smoked. She has never used smokeless tobacco. She reports that she does not drink alcohol and does not use drugs. Family History: family history includes Arthritis in her mother; Breast Cancer in her maternal grandmother; Cancer in her maternal grandmother and paternal grandmother; Mental Retardation in her maternal uncle. Home Medications:    Prior to Admission medications    Medication Sig Start Date End Date Taking? Authorizing Provider   gabapentin (NEURONTIN) 100 MG capsule Take 2 capsules by mouth 3 times daily for 30 days. TAKE TWO CAPSULES BY MOUTH THREE TIMES A DAY 12/7/21 1/6/22 Yes Francine Lozada MD   citalopram (CELEXA) 10 MG tablet Take 1 tablet by mouth nightly 12/7/21  Yes Francine Lozada MD   milnacipran HCl (SAVELLA) 12.5 MG TABS Take 1 tablet by mouth 2 times daily 12/7/21 1/6/22 Yes Francine Lozada MD   estrogens, conjugated, (PREMARIN) 1.25 MG tablet Take 1 tablet by mouth daily 12/7/21  Yes Francine Lozada MD   furosemide (LASIX) 20 MG tablet Take 1 tablet by mouth daily as needed (edema) 12/7/21  Yes Francine Lozada MD   traMADol (ULTRAM) 50 MG tablet Take 1 tablet by mouth nightly as needed for Pain for up to 30 days.  12/17/21 1/16/22 Yes Francine Lozada MD   acetaminophen (TYLENOL) 500 MG tablet Take 500 mg by mouth every 6 hours as needed for Pain   Yes Historical Provider, MD   nitroGLYCERIN (NITROSTAT) 0.3 MG SL tablet Place 1 tablet under the tongue every 5 minutes as needed for Chest pain up to max of 3 total doses. If no relief after 1 dose, call 911. 5/14/21  Yes Minus MD Milagros       REVIEW OF SYSTEMS:    All 14-point review of systems are completed and  pertinent positives are mentioned in the history of present illness. Other  systems are reviewed and are negative. Physical Examination:    /78   Pulse 96   Ht 5' 2\" (1.575 m)   Wt 205 lb 8 oz (93.2 kg)   LMP 08/07/2017 Comment: hyst 08/2017  SpO2 99%   BMI 37.59 kg/m²      Constitutional and General Appearance:    alert, cooperative, no distress and appears stated age  [de-identified]:    PERRLA, no cervical lymphadenopathy. No masses palpable. Normal oral mucosa  Respiratory:  · Normal excursion and expansion without use of accessory muscles  · Resp Auscultation: Normal breath sounds without dullness or wheezing  Cardiovascular:  · The apical impulse is not displaced  · RRR S1S2 w/o M/G/R  Abdomen:  · No masses or tenderness  · Bowel sounds present  Extremities:  ·  No Cyanosis or Clubbing  ·  Lower extremity edema: No  · Skin: Warm and dry  Neurological:  · Alert and oriented. · Moves all extremities well  · No abnormalities of mood, affect, memory, mentation, or behavior are noted    DATA:      ECG 12/14/21: Personally reviewed. Stress test 5/5/2021  Summary There is a large area, moderate to severe, reversible perfusion defect  involving the anterior/anterolateral wall. FIndings are consistent with  inducible ischemia of the LAD territory. Hyperdynamic LV function with LVEF  > 70%. Overall findings represent a high risk scan. [facility] 5/7/2021  Impression:   1. Normal coronary arteries. 2. LVEDP 15 mmHg. Plan:   1. Optimal medical therapy for CAD risk factors.    2. Consider PFTs to evaluate for both obstructive and restrictive   lung disease. 3. It was recommend that she stop her keto diet and focus on   weight through a well-balanced heart healthy diet and regular   physical exercise for at least 30 minutes 3-5 times per week. 4. Follow-up with Ok Cazares in one month. Echo 4/9/2021  Summary   Limited study. Left ventricular cavity size is normal. Normal left   ventricular wall thickness. Overall left ventricular systolic function   appears normal with an ejection fraction of 55%. No regional wall motion   abnormalities are noted. Estimated pulmonary artery systolic pressure is at   31 mmHg assuming a right atrial pressure of 3 mmHg. IMPRESSION:    1. Syncope/presyncope/POTS/IST  03/16/2021  Patient is a pleasant 34-year-old female with a medical history significant for fibromyalgia, sinus tachycardia, and syncope/presyncope. She states that since the last time she saw us in 2018 she has been doing well until over the last 3 months when she began to suffer from recurrent symptoms. She previously was on metoprolol however discontinued as her symptoms improved in the past.  I am unclear as to what her symptoms are related to from a rhythm standpoint as her device shows no arrhythmias. I will asked that she wear a 30-day monitor and follow-up with us in clinic. I also asked that she follow-up with neurology as far as her migraines are concerned as this may be the true etiology of her symptoms.  - Thirty day monitor.  - Follow up with neurology. - Limited echocardiogram for edema.  - Follow up with me in a month. 04/13/2021  Patient underwent echocardiogram which showed normal cardiac function. Her monitor showed rare PACs and PVCs that were not symptomatic. No significant arrhythmias noted on her monitor. Patient did have presyncope and syncopal events. None of these occurred while she was wearing her heart monitor. From a symptom standpoint appears to be vagal in nature.   I will asked that she repeat a tilt table test.  Given her chest pain I will asked that she have a stress test as well as she is post hysterectomy with bilateral oophorectomies.  -Schedule exercise stress test.  - Schedule tilt table testing.  - Continue current course but consider fludrocortisone or midodrine after tilt table test.     5/27/2021  Patient is here for follow-up post loop monitor explantation. She continues to have some pain around her explantation site and just below where blister was unintentionally ruptured. We will give her some more pain medication some antibiotics to help with this. Will refer her to plastic surgery for evaluation. We will switch her from propranolol to verapamil for possible spasms. Does have some wrist discomfort bilaterally that is getting better post radial arterial cannulation. We will need to continue to monitor this. I will asked her to follow-up with me in 3 to 4 weeks. - Start doxycycline 100 mg BID. - Stop propanolol and switch to verapamil for spasms and palpitations. - Referral to breast surgery. - Follow up with me in 3-4 weeks. 6/24/2021  Patient presents for follow-up. As far as her breast concerned it has significantly improved and her scar appears to be healing well. Patient is a very in tune person with a strong mind-body connection, as given by her fibromyalgia. She has been under tremendous amount of stress and in my opinion this has contributed to her physical deterioration symptoms/body wise. She has had some improvement with her verapamil however due to her mental status and pressures I believe that her body is significantly impaired. I will refer her to a psychiatrist and I am hopeful that in combination with the medical therapy decide prescribed that she will continue to improve. I will ask her to follow-up with me in 3 months. Relationship to her overall.   I will extend her time off.  - Psychiatry referral.  - Continue verapamil.  - Follow up in 4 weeks.    9/27/2021  Patient clinically is improved. She is now on an anxiolytic and her symptoms are significant improvement along with her verapamil. She can tell when she is not taking her verapamil. We discussed options including increasing verapamil and or starting her on nitroglycerin sublingual for spasms. We decided to increase her verapamil to 180. If this does not improve her symptoms we will consider decreasing giving her hypotension and start her on some sublingual nitroglycerin while she is in a safe space given its response potentially triggering hypotension as well. - Increase verapamil to 180 mg daily.  - Follow up in 3 months. - Return this evening to fill out paper work for work. 12/14/2021  Patient reports having bradycardic events while on verapamil. Unfortunately they were not captured on her cardiac monitor she recently wore. Her monitor showed inappropriate sinus tachycardia she was in sinus tachycardia while at rest per her report. We discussed options including antiarrhythmic therapy without sayra agents given her reported bradycardia and sinus node modification. At this point patient is amenable to trying some antiarrhythmic therapy. Her left ventricular walls are thin on echocardiogram and she has no ischemic disease on nuclear stress test. We will start her on low-dose flecainide 50 mg twice a day. - Start on flecainide 50 mg BID.  - Follow up in 3 months. 2. Migraines/altered mental state. Etiology unclear. - Plan as per above. 12/14/2021  - Per neurology and PCP. 3. Chest pain   Atypical.  Normal stress test 04/2021.  - Continue to follow clinically. 4. Peripheral edema      5. Dizziness  4/13/2021  Carolina Barton is here today for follow up for syncope/presyncope. AVTAR from 3/2021 showed sinus rhythm with tachycardia, very rare PAC's, and PVC's. At her last visit she was referred to neurology. She has had 4-5 episodes of near syncope in the last 2 weeks. Last episode she had chest pain that radiated into her arms. She has a history ovary removal and then her uterus a few years later due to endometriosis. She would like to have her ILR removed. She is of work until 4/22/2021 due to her symptoms.   - Plan as per above. 5/27/2021 - 12/04/2021  - Plan as per above. RECOMMENDATIONS:   1. Start flecainide 50 mg twice daily. 2. Discussed risks and benefits of sinus node modification for inappropriate sinus tachycardia. This would be a last resort. 3. Encourage exercise and activity as tolerated. 4. Follow up in 3 months. QUALITY MEASURES  1. Tobacco Cessation Counseling: NA  2. Retake of BP if >140/90:   NA  3. Documentation to PCP/referring for new patient:  Sent to PCP at close of office visit  4. CAD patient on anti-platelet: NA  5. CAD patient on STATIN therapy:  NA  6. Patient with CHF and aFib on anticoagulation:  NA     All questions and concerns were addressed to the patient/family. Alternatives to my treatment were discussed. Jaylon Mercedes RN, am scribing for and in the presence of Dr. Radha Finley. 12/14/21 4:25 PM   Fransisco Okeefe RN    The scribe's documentation has been prepared under my direction and personally reviewed by me in its entirety. I confirm that the note above accurately reflects all work, physical examination, the discussion of treatments and procedures, and medical decision making performed by me. Sukh Cho MD personally performed the services described in this documentation as scribed by nurse in my presence, and is both accurate and complete. Electronically signed by Gill Soler MD on 12/14/2021 at 6:59 PM     Dr. Dave Larson MD  Electrophysiology  [facility]. [place] [address]  Phone: [phone]  Fax: [phone]     NOTE: This report was transcribed using voice recognition software.  Every effort was made to ensure accuracy, however, inadvertent computerized transcription errors may be present.

## 2021-12-14 NOTE — PROGRESS NOTES
Patient is scheduled for echocardiogram on 07/01/20.  He was recently in hospital and had an echocardiogram done on 06/26/20.    Please advise if patient still needs test or if it can be cancelled.    Thank you.    [facility]   Electrophysiology Consult Note            Date: 12/14/21  Patient Name: Oniel Reyna  YOB: 1990    Primary Care Physician: Angela Santana MD    CHIEF COMPLAINT:   Chief Complaint   Patient presents with    3 Month Follow-Up    Palpitations     HISTORY OF PRESENT ILLNESS: Oniel Reyna is a 27 y.o. female with a PMH of previous syncope/tachycardia s/p ILR 5/24/2017, chronic migraine headache, fibromyalgia, adenomyosis status post hysterectomy and salpingo-oophorectomy who presented to the ED on 3/2/2021 with concerns of fatigue. She reports she was told all her testing was normal and returned to work. She reports she felt very tired and was experiencing palpitations with associated chest pain. She reports she did not remember anything that happened after that, but she was told she was laying on the floor and had missed 12 work phone calls. On 3/16/2021, her ECG demonstrates SR 84 BPM. She reports she does not have much notice prior to her syncopal episodes. She reported that her syncopal episodes are happening more frequently. She does notice she usually has HA prior to episodes and then she will experience tunnel vision prior to losing consciousness. She reports that if she does feel it coming on, she lays down and tried to rest, which typically helps resolve the issue. She reports she has seen a neurologist in the past for syncope but does not recall what what said. She reports that she experiences edema in her extremities and around her eyes. She reports she was unable to metoprolol due to fatigue and bradycardia. She reports she has been taking gabapentin for about 8 months. She reports she stopped taking it a few days ago to see if this would make a difference, and her syncopal episodes have persisted. She reports she tried to stop her premarin for several days which did not help her syncopal episodes either. Patient denies current SOB.     She wore a AVTAR from 3/16-3/26/2021 which showed an average heart rate of 86, lowest 58, highest 168 bpm, sinus rhythm with tachycardia, very rare PAC's, and PVC's. at her last visit, she was referred to neurology. Today 4/13/2021, she she states she had bot been feeling well and has had 4-5 episodes of almost passing out. With one spell she was outside with her sister in law and was helping with landscaping. She started to feel strange and her sister in law voice sounded like in was at a distance. She had to lay on the ground in order not to pas out. 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 During this spell she had chest pain that radiated into her arms. She has been staying well hydrated but is also trying to loose weight. She states her weight has gone up 4 lbs (207-211) and has foot swelling. She reports having issues with low heart rates in the past on beta blockers. She will be seeing neurology this month. Patient underwent a stress test 5/5/2021 that showed ischemia. She underwent a LHC 5/7/2021 that did not show any evidence of CAD. On 5/14/2021, patient underwent a tilt table test study (normal physiologic response) and ILR removal 5/27/2021. ILR removal was complicated by ILR migrating to under fibrous breat tissues. Patient required sedation to aid with removal. Cellulitis post operatively requiring antibiotics and frequent post op site checks. On 5/27/2021, patient's ECG demonstrated SR 65 BPM. She reported her ILR site is still painful. She described the pain as \"very deep\". She had tried heat and ice for pain relief but this has not been helpful. It prevented her from sleeping. She reported the tramadol is not helping with pain control. She reported she received a referral for a breast surgeon from the ED, but is unsure if she should make an appointment.  She reported she had been waking up in the middle of the night with chest \"spasms\" and took her pulse while she was still laying down and her HR was 36 BPM. This resolved with activity. She reported that she feels her palpitations when her HR is very low or very high. She reported that she gets SOB with exertion when she walks more than 20 feet at a time. She reported she has been experiencing numbness, coldness and tingling  in both hands. At patient's visit 6/24/21, ECG demonstrates SR (75). She presented feeling very dizzy and feeling like her heart is racing, she appears diaphoretic. She stated that she has been having these dizzy spells on and of for about a week now. She stated that these episodes seem to have been exacerbated with a great deal of family stress at home. She stated she has been drinking 4-7, 16 oz bottles of water per day on average. She stated that she thinks her medication is helping with her chest pain and it has decreased her chest pain episodes. She stated her ILR site is healing well. ECG demonstrates 9/28/2021 SR 79 BPM. She reports her ILR explant site is healing well and is pain free. She reports her heart races when she gets up and walks around and it has been stressful for her. She reports that she experiences heart spasms that can last up to 20 minutes at a time. She is also very stressed at work. Her  feels that some of her stress is related to short term disability/FMLA. She was recently diagnosed with PTSD and anxiety and was started on Celexa and feels this has been somewhat helpful. She is also seeing a therapist. She reports things are going well at [employer]. She reports that she can tell if she misses a dose of verapamil. She feels the verapamil is helpful for her tachycardia and heart spasms. Interval History since last office visit: Today, 12/14/2021, ECG demonstrates SR (91). She states that she has had a lot of stress lately and her mother in law passed away, She states she has had several low HR's.  She states that she is taking her medications as prescribed. Patient denies current edema, chest pain, sob, palpitations, dizziness or syncope. Past Medical History:   has a past medical history of Anemia, Anxiety, Heart abnormalities, Miscarriage, Ovarian cyst, and Tricuspid regurgitation. Past Surgical History:   has a past surgical history that includes Appendectomy (2006); Ovarian cyst surgery; Endometrial ablation (02/01/2017); Insertable Cardiac Monitor (05/24/2017); Hysterectomy; Cardiac catheterization (05/07/2021); and Hysterectomy, total abdominal.     Allergies:  Demerol and Procardia [nifedipine]    Social History:   reports that she has never smoked. She has never used smokeless tobacco. She reports that she does not drink alcohol and does not use drugs. Family History: family history includes Arthritis in her mother; Breast Cancer in her maternal grandmother; Cancer in her maternal grandmother and paternal grandmother; Mental Retardation in her maternal uncle. Home Medications:    Prior to Admission medications    Medication Sig Start Date End Date Taking? Authorizing Provider   gabapentin (NEURONTIN) 100 MG capsule Take 2 capsules by mouth 3 times daily for 30 days. TAKE TWO CAPSULES BY MOUTH THREE TIMES A DAY 12/7/21 1/6/22 Yes Luc Rutledge MD   citalopram (CELEXA) 10 MG tablet Take 1 tablet by mouth nightly 12/7/21  Yes Luc Rutledge MD   milnacipran HCl (SAVELLA) 12.5 MG TABS Take 1 tablet by mouth 2 times daily 12/7/21 1/6/22 Yes Luc Rutledge MD   estrogens, conjugated, (PREMARIN) 1.25 MG tablet Take 1 tablet by mouth daily 12/7/21  Yes Luc Rutledge MD   furosemide (LASIX) 20 MG tablet Take 1 tablet by mouth daily as needed (edema) 12/7/21  Yes Luc Rutledge MD   traMADol (ULTRAM) 50 MG tablet Take 1 tablet by mouth nightly as needed for Pain for up to 30 days.  12/17/21 1/16/22 Yes Luc Rutledge MD   acetaminophen (TYLENOL) 500 MG tablet Take 500 mg by mouth every 6 hours as needed for Pain   Yes Historical Provider, MD   nitroGLYCERIN (NITROSTAT) 0.3 MG SL tablet Place 1 tablet under the tongue every 5 minutes as needed for Chest pain up to max of 3 total doses. If no relief after 1 dose, call 911. 5/14/21  Yes Rina Mack MD       REVIEW OF SYSTEMS:    All 14-point review of systems are completed and  pertinent positives are mentioned in the history of present illness. Other  systems are reviewed and are negative. Physical Examination:    /78   Pulse 96   Ht 5' 2\" (1.575 m)   Wt 205 lb 8 oz (93.2 kg)   LMP 08/07/2017 Comment: hyst 08/2017  SpO2 99%   BMI 37.59 kg/m²      Constitutional and General Appearance:    alert, cooperative, no distress and appears stated age  [de-identified]:    PERRLA, no cervical lymphadenopathy. No masses palpable. Normal oral mucosa  Respiratory:  · Normal excursion and expansion without use of accessory muscles  · Resp Auscultation: Normal breath sounds without dullness or wheezing  Cardiovascular:  · The apical impulse is not displaced  · RRR S1S2 w/o M/G/R  Abdomen:  · No masses or tenderness  · Bowel sounds present  Extremities:  ·  No Cyanosis or Clubbing  ·  Lower extremity edema: No  · Skin: Warm and dry  Neurological:  · Alert and oriented. · Moves all extremities well  · No abnormalities of mood, affect, memory, mentation, or behavior are noted    DATA:      ECG 12/14/21: Personally reviewed. Stress test 5/5/2021  Summary There is a large area, moderate to severe, reversible perfusion defect  involving the anterior/anterolateral wall. FIndings are consistent with  inducible ischemia of the LAD territory. Hyperdynamic LV function with LVEF  > 70%. Overall findings represent a high risk scan. [facility] 5/7/2021  Impression:   1. Normal coronary arteries. 2. LVEDP 15 mmHg. Plan:   1. Optimal medical therapy for CAD risk factors. 2. Consider PFTs to evaluate for both obstructive and restrictive   lung disease.    3. It was recommend that she stop her keto diet and focus on   weight through a well-balanced heart healthy diet and regular   physical exercise for at least 30 minutes 3-5 times per week. 4. Follow-up with [facility] in one month. Echo 4/9/2021  Summary   Limited study. Left ventricular cavity size is normal. Normal left   ventricular wall thickness. Overall left ventricular systolic function   appears normal with an ejection fraction of 55%. No regional wall motion   abnormalities are noted. Estimated pulmonary artery systolic pressure is at   31 mmHg assuming a right atrial pressure of 3 mmHg. IMPRESSION:    1. Syncope/presyncope/POTS/IST  03/16/2021  Patient is a pleasant 39-year-old female with a medical history significant for fibromyalgia, sinus tachycardia, and syncope/presyncope. She states that since the last time she saw us in 2018 she has been doing well until over the last 3 months when she began to suffer from recurrent symptoms. She previously was on metoprolol however discontinued as her symptoms improved in the past.  I am unclear as to what her symptoms are related to from a rhythm standpoint as her device shows no arrhythmias. I will asked that she wear a 30-day monitor and follow-up with us in clinic. I also asked that she follow-up with neurology as far as her migraines are concerned as this may be the true etiology of her symptoms.  - Thirty day monitor.  - Follow up with neurology. - Limited echocardiogram for edema.  - Follow up with me in a month. 04/13/2021  Patient underwent echocardiogram which showed normal cardiac function. Her monitor showed rare PACs and PVCs that were not symptomatic. No significant arrhythmias noted on her monitor. Patient did have presyncope and syncopal events. None of these occurred while she was wearing her heart monitor. From a symptom standpoint appears to be vagal in nature.   I will asked that she repeat a tilt table test.  Given her chest pain I will asked that she have a stress test as well as she is post hysterectomy with bilateral oophorectomies.  -Schedule exercise stress test.  - Schedule tilt table testing.  - Continue current course but consider fludrocortisone or midodrine after tilt table test.     5/27/2021  Patient is here for follow-up post loop monitor explantation. She continues to have some pain around her explantation site and just below where blister was unintentionally ruptured. We will give her some more pain medication some antibiotics to help with this. Will refer her to plastic surgery for evaluation. We will switch her from propranolol to verapamil for possible spasms. Does have some wrist discomfort bilaterally that is getting better post radial arterial cannulation. We will need to continue to monitor this. I will asked her to follow-up with me in 3 to 4 weeks. - Start doxycycline 100 mg BID. - Stop propanolol and switch to verapamil for spasms and palpitations. - Referral to breast surgery. - Follow up with me in 3-4 weeks. 6/24/2021  Patient presents for follow-up. As far as her breast concerned it has significantly improved and her scar appears to be healing well. Patient is a very in tune person with a strong mind-body connection, as given by her fibromyalgia. She has been under tremendous amount of stress and in my opinion this has contributed to her physical deterioration symptoms/body wise. She has had some improvement with her verapamil however due to her mental status and pressures I believe that her body is significantly impaired. I will refer her to a psychiatrist and I am hopeful that in combination with the medical therapy decide prescribed that she will continue to improve. I will ask her to follow-up with me in 3 months. Relationship to her overall. I will extend her time off.  - Psychiatry referral.  - Continue verapamil.  - Follow up in 4 weeks.     9/27/2021  Patient clinically is improved. She is now on an anxiolytic and her symptoms are significant improvement along with her verapamil. She can tell when she is not taking her verapamil. We discussed options including increasing verapamil and or starting her on nitroglycerin sublingual for spasms. We decided to increase her verapamil to 180. If this does not improve her symptoms we will consider decreasing giving her hypotension and start her on some sublingual nitroglycerin while she is in a safe space given its response potentially triggering hypotension as well. - Increase verapamil to 180 mg daily.  - Follow up in 3 months. - Return this evening to fill out paper work for work. 12/14/2021  Patient reports having bradycardic events while on verapamil. Unfortunately they were not captured on her cardiac monitor she recently wore. Her monitor showed inappropriate sinus tachycardia she was in sinus tachycardia while at rest per her report. We discussed options including antiarrhythmic therapy without sayra agents given her reported bradycardia and sinus node modification. At this point patient is amenable to trying some antiarrhythmic therapy. Her left ventricular walls are thin on echocardiogram and she has no ischemic disease on nuclear stress test. We will start her on low-dose flecainide 50 mg twice a day. - Start on flecainide 50 mg BID.  - Follow up in 3 months. 2. Migraines/altered mental state. Etiology unclear. - Plan as per above. 12/14/2021  - Per neurology and PCP. 3. Chest pain   Atypical.  Normal stress test 04/2021.  - Continue to follow clinically. 4. Peripheral edema      5. Dizziness  4/13/2021  Ernestina Henderson is here today for follow up for syncope/presyncope. AVTAR from 3/2021 showed sinus rhythm with tachycardia, very rare PAC's, and PVC's. At her last visit she was referred to neurology. She has had 4-5 episodes of near syncope in the last 2 weeks.  Last episode she had chest pain that radiated into her arms. She has a history ovary removal and then her uterus a few years later due to endometriosis. She would like to have her ILR removed. She is of work until 4/22/2021 due to her symptoms.   - Plan as per above. 5/27/2021 - 12/04/2021  - Plan as per above. RECOMMENDATIONS:   1. Start flecainide 50 mg twice daily. 2. Discussed risks and benefits of sinus node modification for inappropriate sinus tachycardia. This would be a last resort. 3. Encourage exercise and activity as tolerated. 4. Follow up in 3 months. QUALITY MEASURES  1. Tobacco Cessation Counseling: NA  2. Retake of BP if >140/90:   NA  3. Documentation to PCP/referring for new patient:  Sent to PCP at close of office visit  4. CAD patient on anti-platelet: NA  5. CAD patient on STATIN therapy:  NA  6. Patient with CHF and aFib on anticoagulation:  NA     All questions and concerns were addressed to the patient/family. Alternatives to my treatment were discussed. Giovanni Valentine RN, am scribing for and in the presence of Dr. Robert Mon. 12/14/21 4:25 PM   Garland Wood RN    The scribe's documentation has been prepared under my direction and personally reviewed by me in its entirety. I confirm that the note above accurately reflects all work, physical examination, the discussion of treatments and procedures, and medical decision making performed by me. Edilberto Betancourt MD personally performed the services described in this documentation as scribed by nurse in my presence, and is both accurate and complete. Electronically signed by Mynor Davis MD on 12/14/2021 at 6:59 PM     Dr. Giles Felix MD  Electrophysiology  [facility]. [facility]. [address]. [place], [address]  Phone: [phone]  Fax: [phone]     NOTE: This report was transcribed using voice recognition software.  Every effort was made to ensure accuracy, however, inadvertent computerized transcription errors may be present.

## 2021-12-14 NOTE — PATIENT INSTRUCTIONS
RECOMMENDATIONS:   1. Start flecainide 50 mg twice daily. 2. Discussed risks and benefits of sinus node modification for inappropriate sinus tachycardia. This would be a last resort. 3. Encourage exercise and activity as tolerated. 4. Follow up in 3 months.

## 2021-12-21 DIAGNOSIS — R00.2 PALPITATION: ICD-10-CM

## 2021-12-22 ENCOUNTER — TELEPHONE (OUTPATIENT)
Dept: CARDIOLOGY CLINIC | Age: 31
End: 2021-12-22

## 2021-12-22 NOTE — TELEPHONE ENCOUNTER
Unable to reach patient to discuss monitor results. Phone number not connected. Results were discussed in clinic at last OV with [address] [place],[address].

## 2021-12-28 ENCOUNTER — TELEPHONE (OUTPATIENT)
Dept: CARDIOLOGY CLINIC | Age: 31
End: 2021-12-28

## 2021-12-28 DIAGNOSIS — R00.2 PALPITATIONS: ICD-10-CM

## 2022-01-11 DIAGNOSIS — R00.0 TACHYCARDIA: ICD-10-CM

## 2022-01-11 DIAGNOSIS — R00.2 PALPITATION: ICD-10-CM

## 2022-01-11 DIAGNOSIS — M79.7 FIBROMYALGIA: ICD-10-CM

## 2022-01-11 RX ORDER — FUROSEMIDE 20 MG/1
20 TABLET ORAL DAILY PRN
Qty: 30 TABLET | Refills: 0 | Status: SHIPPED | OUTPATIENT
Start: 2022-01-11 | End: 2022-03-07 | Stop reason: SDUPTHER

## 2022-01-11 RX ORDER — TRAMADOL HYDROCHLORIDE 50 MG/1
50 TABLET ORAL NIGHTLY PRN
Qty: 30 TABLET | Refills: 0 | Status: SHIPPED | OUTPATIENT
Start: 2022-01-15 | End: 2022-02-11 | Stop reason: SDUPTHER

## 2022-01-11 RX ORDER — GABAPENTIN 100 MG/1
200 CAPSULE ORAL 3 TIMES DAILY
Qty: 180 CAPSULE | Refills: 0 | Status: SHIPPED | OUTPATIENT
Start: 2022-01-11 | End: 2022-02-11 | Stop reason: SDUPTHER

## 2022-01-11 RX ORDER — CITALOPRAM 10 MG/1
10 TABLET ORAL NIGHTLY
Qty: 30 TABLET | Refills: 5 | Status: SHIPPED | OUTPATIENT
Start: 2022-01-11 | End: 2022-02-22

## 2022-01-11 RX ORDER — MILNACIPRAN HCL 12.5 MG
12.5 TABLET ORAL 2 TIMES DAILY
Qty: 60 TABLET | Refills: 0 | Status: SHIPPED | OUTPATIENT
Start: 2022-01-11 | End: 2022-03-24 | Stop reason: ALTCHOICE

## 2022-01-12 RX ORDER — FLECAINIDE ACETATE 50 MG/1
50 TABLET ORAL 2 TIMES DAILY
Qty: 180 TABLET | Refills: 3 | Status: SHIPPED | OUTPATIENT
Start: 2022-01-12 | End: 2022-02-22

## 2022-02-11 DIAGNOSIS — M79.7 FIBROMYALGIA: ICD-10-CM

## 2022-02-11 RX ORDER — TRAMADOL HYDROCHLORIDE 50 MG/1
50 TABLET ORAL NIGHTLY PRN
Qty: 30 TABLET | Refills: 0 | Status: SHIPPED | OUTPATIENT
Start: 2022-02-12 | End: 2022-03-07 | Stop reason: SDUPTHER

## 2022-02-11 RX ORDER — GABAPENTIN 100 MG/1
200 CAPSULE ORAL 3 TIMES DAILY
Qty: 180 CAPSULE | Refills: 2 | Status: SHIPPED | OUTPATIENT
Start: 2022-02-11 | End: 2022-04-08 | Stop reason: SDUPTHER

## 2022-02-22 ENCOUNTER — HOSPITAL ENCOUNTER (EMERGENCY)
Age: 32
Discharge: HOME | End: 2022-02-22
Payer: COMMERCIAL

## 2022-02-22 VITALS
DIASTOLIC BLOOD PRESSURE: 88 MMHG | BODY MASS INDEX: 34.96 KG/M2 | RESPIRATION RATE: 16 BRPM | TEMPERATURE: 98.5 F | WEIGHT: 190 LBS | HEART RATE: 89 BPM | OXYGEN SATURATION: 100 % | SYSTOLIC BLOOD PRESSURE: 136 MMHG | HEIGHT: 62 IN

## 2022-02-22 DIAGNOSIS — K04.7 DENTAL INFECTION: ICD-10-CM

## 2022-02-22 DIAGNOSIS — K08.89 ODONTALGIA: Primary | ICD-10-CM

## 2022-02-22 PROCEDURE — 99283 EMERGENCY DEPT VISIT LOW MDM: CPT

## 2022-02-22 PROCEDURE — 6370000000 HC RX 637 (ALT 250 FOR IP): Performed by: PHYSICIAN ASSISTANT

## 2022-02-22 RX ORDER — CLINDAMYCIN HYDROCHLORIDE 150 MG/1
450 CAPSULE ORAL 3 TIMES DAILY
Qty: 90 CAPSULE | Refills: 0 | Status: SHIPPED | OUTPATIENT
Start: 2022-02-22 | End: 2022-03-04

## 2022-02-22 RX ORDER — CLINDAMYCIN HYDROCHLORIDE 150 MG/1
450 CAPSULE ORAL ONCE
Status: COMPLETED | OUTPATIENT
Start: 2022-02-22 | End: 2022-02-22

## 2022-02-22 RX ADMIN — CLINDAMYCIN HYDROCHLORIDE 450 MG: 150 CAPSULE ORAL at 18:50

## 2022-02-22 ASSESSMENT — ENCOUNTER SYMPTOMS
SHORTNESS OF BREATH: 0
VOMITING: 0
TROUBLE SWALLOWING: 0
TRISMUS: 0
VOICE CHANGE: 0
FACIAL SWELLING: 1

## 2022-02-22 ASSESSMENT — PAIN DESCRIPTION - PAIN TYPE: TYPE: ACUTE PAIN

## 2022-02-22 ASSESSMENT — PAIN DESCRIPTION - LOCATION: LOCATION: MOUTH

## 2022-02-22 ASSESSMENT — PAIN SCALES - GENERAL: PAINLEVEL_OUTOF10: 7

## 2022-02-22 ASSESSMENT — PAIN DESCRIPTION - DESCRIPTORS: DESCRIPTORS: ACHING

## 2022-02-22 NOTE — ED PROVIDER NOTES
[facility]        Pt Name: Vidhi Stallworth  MRN: [id number]  [place] 1990  Date of evaluation: 2/22/2022  Provider: Herb Aj PA-C  PCP: Cain Juarez MD    Shared Visit or Autonomous Visit: MERLYN. I have evaluated this patient. My supervising physician was available for consultation. CHIEF COMPLAINT       Chief Complaint   Patient presents with    Dental Pain     pt c/o L sided dental pain x3 weeks       HISTORY OF PRESENT ILLNESS   (Location/Symptom, Timing/Onset, Context/Setting, Quality, Duration, Modifying Factors, Severity)  Note limiting factors. Vidhi Stallworth is a 27 y.o. female presenting to the emergency department for evaluation of left lower dental pain for the past 3 weeks. States she has made 7 phone calls today trying to get something called in for her dental pain but was advised to be seen due to facial swelling. States may have had a low grade fever 2 days ago. No vomiting. No trouble swallowing. Had left over keflex taking the last 3 days not helping. The history is provided by the patient. Dental Pain  Location:  Lower  Onset quality:  Gradual  Duration:  3 weeks  Timing:  Constant  Progression:  Worsening  Chronicity:  New  Associated symptoms: facial swelling    Associated symptoms: no difficulty swallowing, no drooling, no fever, no neck swelling and no trismus        Nursing Notes were reviewed    REVIEW OF SYSTEMS    (2-9 systems for level 4, 10 or more for level 5)     Review of Systems   Constitutional: Negative for fever. HENT: Positive for dental problem and facial swelling. Negative for drooling, trouble swallowing and voice change. Respiratory: Negative for shortness of breath. Cardiovascular: Negative for chest pain. Gastrointestinal: Negative for vomiting. Positives and Pertinent negatives as per HPI.        PAST MEDICAL HISTORY     Past Medical History:   Diagnosis Date    Anemia     Currently on Iron PO    Anxiety     Heart abnormalities     \"heart flutters real fast\"    Miscarriage 2009    Ovarian cyst     Tricuspid regurgitation          SURGICAL HISTORY     Past Surgical History:   Procedure Laterality Date    APPENDECTOMY  2006    CARDIAC CATHETERIZATION  05/07/2021    WNL    ENDOMETRIAL ABLATION  02/01/2017    With Tubal ligation    HYSTERECTOMY      HYSTERECTOMY, TOTAL ABDOMINAL      INSERTABLE CARDIAC MONITOR  05/24/2017    Loop Recorder Left Chest    OVARIAN CYST SURGERY           CURRENTMEDICATIONS       Discharge Medication List as of 2/22/2022  6:47 PM      CONTINUE these medications which have NOT CHANGED    Details   gabapentin (NEURONTIN) 100 MG capsule Take 2 capsules by mouth 3 times daily for 30 days. TAKE TWO CAPSULES BY MOUTH THREE TIMES A DAY, Disp-180 capsule, R-2Normal      estrogens, conjugated, (PREMARIN) 1.25 MG tablet Take 1 tablet by mouth daily, Disp-30 tablet, R-2Normal      traMADol (ULTRAM) 50 MG tablet Take 1 tablet by mouth nightly as needed for Pain for up to 30 days. , Disp-30 tablet, R-0Normal      furosemide (LASIX) 20 MG tablet Take 1 tablet by mouth daily as needed (edema), Disp-30 tablet, R-0Normal      nitroGLYCERIN (NITROSTAT) 0.3 MG SL tablet Place 1 tablet under the tongue every 5 minutes as needed for Chest pain up to max of 3 total doses.  If no relief after 1 dose, call 911., Disp-30 tablet, R-3Normal      milnacipran HCl (SAVELLA) 12.5 MG TABS Take 1 tablet by mouth 2 times daily, Disp-60 tablet, R-0Normal               ALLERGIES     Demerol and Procardia [nifedipine]    FAMILYHISTORY       Family History   Problem Relation Age of Onset    Cancer Maternal Grandmother         Thyroid    Breast Cancer Maternal Grandmother     Cancer Paternal Grandmother     Arthritis Mother     Mental Retardation Maternal Uncle           SOCIAL HISTORY       Social History     Socioeconomic History    Marital status:      Spouse name: None    Number of children: None    Years of education: None    Highest education level: None   Occupational History    None   Tobacco Use    Smoking status: Never Smoker    Smokeless tobacco: Never Used   Vaping Use    Vaping Use: Never used   Substance and Sexual Activity    Alcohol use: No    Drug use: No    Sexual activity: Yes     Partners: Male   Other Topics Concern    None   Social History Narrative    None     Social Determinants of Health     Financial Resource Strain:     Difficulty of Paying Living Expenses: Not on file   Food Insecurity:     Worried About Running Out of Food in the Last Year: Not on file    Víctor of Food in the Last Year: Not on file   Transportation Needs:     Lack of Transportation (Medical): Not on file    Lack of Transportation (Non-Medical):  Not on file   Physical Activity:     Days of Exercise per Week: Not on file    Minutes of Exercise per Session: Not on file   Stress:     Feeling of Stress : Not on file   Social Connections:     Frequency of Communication with Friends and Family: Not on file    Frequency of Social Gatherings with Friends and Family: Not on file    Attends Jain Services: Not on file    Active Member of [address] [place] [address] or Organizations: Not on file    Attends Club or Organization Meetings: Not on file    Marital Status: Not on file   Intimate Partner Violence:     Fear of Current or Ex-Partner: Not on file    Emotionally Abused: Not on file    Physically Abused: Not on file    Sexually Abused: Not on file   Housing Stability:     Unable to Pay for Housing in the Last Year: Not on file    Number of [place] in the Last Year: Not on file    Unstable Housing in the Last Year: Not on file       SCREENINGS    Ramer Coma Scale  Eye Opening: Spontaneous  Best Verbal Response: Oriented  Best Motor Response: Obeys commands  Vannessa Coma Scale Score: 15        PHYSICAL EXAM    (up to 7 for level 4, 8 or more for level 5)     ED Triage Vitals [02/22/22 1825]   BP Temp Temp Source Pulse Resp SpO2 Height Weight   136/88 98.5 °F (36.9 °C) Oral 89 16 100 % 5' 2\" (1.575 m) 190 lb (86.2 kg)       Physical Exam  Vitals and nursing note reviewed. Constitutional:       Appearance: She is well-developed. She is not ill-appearing or toxic-appearing. HENT:      Head: Normocephalic and atraumatic. Right Ear: Tympanic membrane and ear canal normal. Tympanic membrane is not erythematous or bulging. Left Ear: Tympanic membrane and ear canal normal. Tympanic membrane is not erythematous or bulging. Mouth/Throat:      Mouth: Mucous membranes are moist.      Dentition: Dental tenderness and dental caries present. No dental abscesses. Pharynx: Oropharynx is clear. Uvula midline. No pharyngeal swelling, oropharyngeal exudate, posterior oropharyngeal erythema or uvula swelling. Comments: Tenderness to palpation left lower second to last molar with dental caries. No dental abscess. No drainage. Mild swelling left lower jaw. No trismus. Oropharynx is clear. No sublingual, submental or submandibular swelling. Normal voice. Eyes:      Conjunctiva/sclera: Conjunctivae normal.   Cardiovascular:      Rate and Rhythm: Normal rate and regular rhythm. Heart sounds: Normal heart sounds. Pulmonary:      Effort: Pulmonary effort is normal. No respiratory distress. Breath sounds: Normal breath sounds. No stridor. No wheezing, rhonchi or rales. Musculoskeletal:      Cervical back: Normal range of motion and neck supple. No rigidity. Skin:     General: Skin is warm and dry. Neurological:      Mental Status: She is alert and oriented to person, place, and time. Motor: No abnormal muscle tone. Psychiatric:         Mood and Affect: Mood is anxious.          Behavior: Behavior normal.         DIAGNOSTIC RESULTS   LABS:    Labs Reviewed - No data to display    All other labs were within normal range or not returned as of this dictation. EKG: All EKG's are interpreted by the Emergency Department Physician in the absence of a cardiologist.  Please see their note for interpretation of EKG. RADIOLOGY:   Non-plain film images such as CT, Ultrasound and MRI are read by the radiologist. Plain radiographic images are visualized andpreliminarily interpreted by the  ED Provider with the below findings:        Interpretation perthe Radiologist below, if available at the time of this note:    No orders to display     No results found. PROCEDURES   Unless otherwise noted below, none     Procedures    CRITICAL CARE TIME   N/A    CONSULTS:  None      EMERGENCY DEPARTMENT COURSE and DIFFERENTIAL DIAGNOSIS/MDM:   Vitals:    Vitals:    02/22/22 1825   BP: 136/88   Pulse: 89   Resp: 16   Temp: 98.5 °F (36.9 °C)   TempSrc: Oral   SpO2: 100%   Weight: 190 lb (86.2 kg)   Height: 5' 2\" (1.575 m)       Patient was given thefollowing medications:  Medications   clindamycin (CLEOCIN) capsule 450 mg (450 mg Oral Given 2/22/22 1850)       ED course  Patient presented to the ER for evaluation of left lower dental pain for the past 3 weeks. On exam she has dental caries tenderness to palpation second to last rear left lower molar. No abscess. She has mild swelling to the left lower jaw. Oropharynx is clear. No airway compromise. No signs of ludwigs or deep space infection. Started on clindamycin. Provided dental clinic list. Ernst Friday to see a dentist as soon as possible. Advised return to the ER for any worsening symptoms specifically worsening pain, increased swelling, any difficulty swallowing, fevers or vomiting. She understands and agrees. I estimate there is LOW risk for a DEEP SPACE INFECTION (e.g., CARMINES ANGINA OR RETROPHARYNGEAL ABSCESS), MENINGITIS, or AIRWAY COMPROMISE, thus I consider the discharge disposition reasonable. Also, there is no evidence or peritonitis, sepsis, or toxicity. FINAL IMPRESSION      1. Odontalgia    2.

## 2022-03-07 DIAGNOSIS — M79.7 FIBROMYALGIA: ICD-10-CM

## 2022-03-08 ENCOUNTER — TELEPHONE (OUTPATIENT)
Dept: INTERNAL MEDICINE CLINIC | Age: 32
End: 2022-03-08

## 2022-03-08 RX ORDER — TRAMADOL HYDROCHLORIDE 50 MG/1
50 TABLET ORAL NIGHTLY PRN
Qty: 30 TABLET | Refills: 0 | OUTPATIENT
Start: 2022-03-12 | End: 2022-04-11

## 2022-03-08 RX ORDER — TRAMADOL HYDROCHLORIDE 50 MG/1
50 TABLET ORAL NIGHTLY PRN
Qty: 30 TABLET | Refills: 0 | Status: SHIPPED | OUTPATIENT
Start: 2022-03-13 | End: 2022-04-08

## 2022-03-08 RX ORDER — TRAMADOL HYDROCHLORIDE 50 MG/1
TABLET ORAL
Qty: 30 TABLET | Refills: 0 | OUTPATIENT
Start: 2022-03-12 | End: 2022-04-11

## 2022-03-08 RX ORDER — FUROSEMIDE 20 MG/1
20 TABLET ORAL DAILY PRN
Qty: 30 TABLET | Refills: 0 | Status: SHIPPED | OUTPATIENT
Start: 2022-03-08 | End: 2022-06-06 | Stop reason: SDUPTHER

## 2022-03-08 NOTE — TELEPHONE ENCOUNTER
----- Message from [facility] sent at 3/8/2022 10:44 AM EST -----  Contact: Lucreciarachel Morgan [phone]    ----- Message -----  From: Rosendo Hoep MD  Sent: 3/8/2022  10:42 AM EST  To: Eriberto Chaves dont know any other better options  Have her contact GYN  ----- Message -----  From: Jeremie León  Sent: 3/8/2022  10:09 AM EST  To: Rosendo Hope MD    Patient states she cannot afford the estrogens, conjugated, (PREMARIN) 1.25 MG tablet and is asking is there a more affordable option.      [address] [facility] [phone]    Thank you

## 2022-03-24 ENCOUNTER — OFFICE VISIT (OUTPATIENT)
Dept: INTERNAL MEDICINE CLINIC | Age: 32
End: 2022-03-24

## 2022-03-24 VITALS
HEART RATE: 70 BPM | BODY MASS INDEX: 38.64 KG/M2 | HEIGHT: 62 IN | WEIGHT: 210 LBS | DIASTOLIC BLOOD PRESSURE: 75 MMHG | SYSTOLIC BLOOD PRESSURE: 130 MMHG | RESPIRATION RATE: 18 BRPM

## 2022-03-24 DIAGNOSIS — F51.01 PRIMARY INSOMNIA: ICD-10-CM

## 2022-03-24 DIAGNOSIS — M79.7 FIBROMYALGIA: Primary | ICD-10-CM

## 2022-03-24 DIAGNOSIS — I10 BENIGN ESSENTIAL HTN: ICD-10-CM

## 2022-03-24 PROCEDURE — 99212 OFFICE O/P EST SF 10 MIN: CPT | Performed by: INTERNAL MEDICINE

## 2022-03-24 RX ORDER — MILNACIPRAN HYDROCHLORIDE 25 MG/1
25 TABLET, FILM COATED ORAL 2 TIMES DAILY
Qty: 60 TABLET | Refills: 2 | Status: SHIPPED
Start: 2022-03-24 | End: 2022-03-24

## 2022-03-24 ASSESSMENT — ENCOUNTER SYMPTOMS: BACK PAIN: 1

## 2022-03-24 NOTE — PROGRESS NOTES
Melquiades Patton (:  1990) is a 27 y.o. female,Established patient, here for evaluation of the following chief complaint(s):  No chief complaint on file. SUBJECTIVE/OBJECTIVE:    HPI    27 y.o. female with hx of fibromyalgia here for regular f.w       syncopal event at work leading to cardiology consultation, had previous loop recorder showing sinus tachycardia with PAC but no abnormal rhythm. Had abnormal stress test and normal angiogram. Tilt table study was normal. Placed on verapamil by EP and reports no further syncope but has presyncopal events  Thought these events could be psychiatric in nature with increased stress . 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 This new job is less stressful and feels some better    Fibromyalgia - Continues to report multiple somatic issues - low back pain ,bilateral feet pain which are progressive     Today reports having pain in bottom of foot, pedal edema, unable to walk much, using some inserts which helps  Reports tramadol has helped some but not enough  Currently seeing podiatry in [facility] and had MRI done     Had multiple testing for lumbar spine and hip pain with no etiology found  Reports no nsaids have worked - tried [employer], diclofenac, celebrex, ibuprofen with no benefit         Working out to lose [employer]-     Lives with 3 children and     Allergies   Allergen Reactions    Demerol Other (See Comments)     Hallucinations, nausea, aka MEPERIDINE    Procardia [Nifedipine] Rash     Current Outpatient Medications   Medication Sig Dispense Refill    furosemide (LASIX) 20 MG tablet Take 1 tablet by mouth daily as needed (edema) 30 tablet 0    traMADol (ULTRAM) 50 MG tablet Take 1 tablet by mouth nightly as needed for Pain for up to 30 days. 30 tablet 0    gabapentin (NEURONTIN) 100 MG capsule Take 2 capsules by mouth 3 times daily for 30 days. TAKE TWO CAPSULES BY MOUTH THREE TIMES A  capsule 2    estrogens, conjugated, (PREMARIN) 1.25 MG tablet Take 1 tablet by mouth daily 30 tablet 2    milnacipran HCl (SAVELLA) 12.5 MG TABS Take 1 tablet by mouth 2 times daily 60 tablet 0    nitroGLYCERIN (NITROSTAT) 0.3 MG SL tablet Place 1 tablet under the tongue every 5 minutes as needed for Chest pain up to max of 3 total doses. If no relief after 1 dose, call 911. 30 tablet 3     No current facility-administered medications for this visit. Review of Systems   Musculoskeletal: Positive for arthralgias, back pain and gait problem. Psychiatric/Behavioral: Positive for dysphoric mood. Constitutional: Negative for fever or chills + chronic fatigue  HENT: Negative for sore throat   Eyes: Negative for redness   Respiratory: Negative  for dyspnea, cough   Cardiovascular: Negative for chest pain   Gastrointestinal:neg for abd pain, nausea or vomiting or diarrhea  No melena or BRPR  Genitourinary: Negative for hematuria   Musculoskeletal: +ve  for arthralgias   Skin: Negative for rash   Neurological: +ve  syncope   Hematological: Negative for adenopathy   Psychiatric/Behavorial: Negative for anxiety        Physical Exam   Vitals:    03/24/22 1103   BP: 130/75   Pulse: 70   Resp: 18         General:  Young female, healthy appearing Awake, alert and oriented. Appears to be not in any distress  Mucous Membranes:  Pink , anicteric  Neck: No JVD, no carotid bruit, no thyromegaly  Chest:  Clear to auscultation bilaterally, no added sounds  Cardiovascular:  RRR S1S2 heard, no murmurs or gallops  Abdomen:  Soft, obese, undistended, non tender, no organomegaly, BS present  Extremities: No edema or cyanosis. Distal pulses well felt  Neurological : grossly normal  Normal appearance .  Normal pulses       Wt Readings from Last 3 Encounters:   03/24/22 210 lb (95.3 kg)   02/22/22 190 lb (86.2 kg)   12/14/21 205 lb 8 oz (93.2 kg)     Stress test-  5/5/2021  Summary There is a large area, moderate to severe, reversible perfusion defect  involving the anterior/anterolateral wall. FIndings are consistent with  inducible ischemia of the LAD territory. Hyperdynamic LV function with LVEF  > 70%. Overall findings represent a high risk scan.      [facility] -5/7/2021    1. Normal coronary arteries. 2. LVEDP 15 mmHg. Diagnosis Orders   1. Fibromyalgia     2. Primary insomnia     3. Benign essential HTN         Recurrent presyncope /syncope - extensive workup as above neg  Stress test positive but cath neg  Tilt table testing neg  tsh wnl  Echo wnl   Off work for now - on verapamil 180 mg daily    Possibly related to anxiety . Anxiety - now f/w psychiatrist . Recently we started on celexa 10 mg daily and it is helping     Fibromyalgia - multiple somatic complaints - previous investigations have been neg - mulitple meds did not help  - trial of celebrex recently with no relief  - changed to relafen , did not help  - add foot inserts , f/w  podiatry  - added gabapentin now at 500 mg daily on divided doses -   - tramadol at night  - savella added but cost issues noted        Obesity noted - weight loss and life style modification along with dietary changes, increased physical activity encouraged    Pedal edema- not noted today. Has lasix to take prn     menpausal symptoms - post RAMSEY, on estrogen supplements      Recommend covid vaccine         An electronic signature was used to authenticate this note.     --Emily Wolf MD

## 2022-03-24 NOTE — TELEPHONE ENCOUNTER
----- Message from Du Paulson MD sent at 3/24/2022 12:06 PM EDT -----  Contact: [phone] (H)  She has tried multiple meds before not sure if any of them will work  Cut down savella back to 12.5 mg bid  ----- Message -----  From: Hollis Medrano MA  Sent: 3/24/2022  11:54 AM EDT  To: Du Paulson MD    Pt said since you changed her dose on her milnacipran HCl (SAVELLA) 25 MG TABS it will cost over 300 dollars is there something else you can RX her that could be more affordable please

## 2022-03-25 RX ORDER — MILNACIPRAN HCL 12.5 MG
12.5 TABLET ORAL 2 TIMES DAILY
Qty: 60 TABLET | Refills: 2 | Status: SHIPPED
Start: 2022-03-25 | End: 2022-03-28 | Stop reason: ALTCHOICE

## 2022-03-28 NOTE — TELEPHONE ENCOUNTER
----- Message from Judd Mcbride MD sent at 3/28/2022  2:58 PM EDT -----  Contact: [facility]   [phone]  She has tried multiple meds and she did not continue due to adverse effects  If willing , can try elavil again 10 mg nightly  ----- Message -----  From: Jonathon   Sent: 3/28/2022   1:13 PM EDT  To: Judd Mcbride MD    Patient called and stated that the generic medication Milnacipran for [facility]. The patient states that the cost is still 400.00 that she cannot afford. So patient states needs to see if there is any thing less expensive that her insurance will cover.       [facility] [address] - F [phone] (Ph: [phone])

## 2022-03-29 RX ORDER — AMITRIPTYLINE HYDROCHLORIDE 10 MG/1
10 TABLET, FILM COATED ORAL NIGHTLY
Qty: 90 TABLET | Refills: 0 | Status: SHIPPED | OUTPATIENT
Start: 2022-03-29 | End: 2022-07-06 | Stop reason: ALTCHOICE

## 2022-04-08 DIAGNOSIS — M79.7 FIBROMYALGIA: ICD-10-CM

## 2022-04-08 RX ORDER — TRAMADOL HYDROCHLORIDE 50 MG/1
50 TABLET ORAL NIGHTLY PRN
Qty: 30 TABLET | Refills: 0 | OUTPATIENT
Start: 2022-04-10 | End: 2022-05-10

## 2022-04-08 RX ORDER — GABAPENTIN 100 MG/1
200 CAPSULE ORAL 3 TIMES DAILY
Qty: 180 CAPSULE | Refills: 0 | Status: SHIPPED | OUTPATIENT
Start: 2022-04-08 | End: 2022-05-09 | Stop reason: SDUPTHER

## 2022-04-08 RX ORDER — TRAMADOL HYDROCHLORIDE 50 MG/1
TABLET ORAL
Qty: 30 TABLET | Refills: 0 | Status: SHIPPED | OUTPATIENT
Start: 2022-04-10 | End: 2022-05-09

## 2022-05-09 DIAGNOSIS — M79.7 FIBROMYALGIA: ICD-10-CM

## 2022-05-09 RX ORDER — GABAPENTIN 100 MG/1
200 CAPSULE ORAL 3 TIMES DAILY
Qty: 180 CAPSULE | Refills: 0 | Status: SHIPPED | OUTPATIENT
Start: 2022-05-09 | End: 2022-06-06 | Stop reason: SDUPTHER

## 2022-05-09 RX ORDER — TRAMADOL HYDROCHLORIDE 50 MG/1
TABLET ORAL
Qty: 30 TABLET | Refills: 0 | Status: SHIPPED | OUTPATIENT
Start: 2022-05-09 | End: 2022-06-06 | Stop reason: SDUPTHER

## 2022-05-09 RX ORDER — TRAMADOL HYDROCHLORIDE 50 MG/1
50 TABLET ORAL NIGHTLY PRN
Qty: 30 TABLET | Refills: 0 | OUTPATIENT
Start: 2022-05-09 | End: 2022-06-08

## 2022-05-19 ENCOUNTER — OFFICE VISIT (OUTPATIENT)
Dept: CARDIOLOGY CLINIC | Age: 32
End: 2022-05-19
Payer: COMMERCIAL

## 2022-05-19 VITALS
OXYGEN SATURATION: 100 % | BODY MASS INDEX: 39.47 KG/M2 | DIASTOLIC BLOOD PRESSURE: 62 MMHG | SYSTOLIC BLOOD PRESSURE: 116 MMHG | WEIGHT: 214.5 LBS | HEIGHT: 62 IN | HEART RATE: 84 BPM

## 2022-05-19 DIAGNOSIS — G90.A POTS (POSTURAL ORTHOSTATIC TACHYCARDIA SYNDROME): ICD-10-CM

## 2022-05-19 DIAGNOSIS — R00.0 TACHYCARDIA: ICD-10-CM

## 2022-05-19 DIAGNOSIS — R00.2 PALPITATION: Primary | ICD-10-CM

## 2022-05-19 PROCEDURE — 93000 ELECTROCARDIOGRAM COMPLETE: CPT | Performed by: INTERNAL MEDICINE

## 2022-05-19 PROCEDURE — 99214 OFFICE O/P EST MOD 30 MIN: CPT | Performed by: INTERNAL MEDICINE

## 2022-05-19 RX ORDER — FLECAINIDE ACETATE 50 MG/1
50 TABLET ORAL 2 TIMES DAILY
Qty: 60 TABLET | Refills: 3 | Status: SHIPPED | OUTPATIENT
Start: 2022-05-19 | End: 2022-06-27 | Stop reason: SDUPTHER

## 2022-05-19 RX ORDER — DILTIAZEM HYDROCHLORIDE 120 MG/1
120 CAPSULE, COATED, EXTENDED RELEASE ORAL DAILY
Qty: 30 CAPSULE | Refills: 3 | Status: SHIPPED | OUTPATIENT
Start: 2022-05-19 | End: 2022-06-27 | Stop reason: SDUPTHER

## 2022-05-19 RX ORDER — MAGNESIUM OXIDE 400 MG/1
400 TABLET ORAL DAILY
Qty: 30 TABLET | Refills: 3 | Status: SHIPPED | OUTPATIENT
Start: 2022-05-19 | End: 2022-06-27 | Stop reason: SDUPTHER

## 2022-05-19 NOTE — PROGRESS NOTES
[address]   Electrophysiology Consult Note            Date: 5/19/22  Patient Name: Celso Hill  YOB: 1990    Primary Care Physician: Tai Paz MD    CHIEF COMPLAINT:   Chief Complaint   Patient presents with    3 Month Follow-Up    Chest Pain    Palpitations    Tachycardia     HISTORY OF PRESENT ILLNESS: Celso Hill is a 27 y.o. female with a PMH of previous syncope/tachycardia s/p ILR 5/24/2017, chronic migraine headache, fibromyalgia, adenomyosis status post hysterectomy and salpingo-oophorectomy who presented to the ED on 3/2/2021 with concerns of fatigue. She reports she was told all her testing was normal and returned to work. She reports she felt very tired and was experiencing palpitations with associated chest pain. She reports she did not remember anything that happened after that, but she was told she was laying on the floor and had missed 12 work phone calls. On 3/16/2021, her ECG demonstrates SR 84 BPM. She reports she does not have much notice prior to her syncopal episodes. She reported that her syncopal episodes are happening more frequently. She does notice she usually has HA prior to episodes and then she will experience tunnel vision prior to losing consciousness. She reports that if she does feel it coming on, she lays down and tried to rest, which typically helps resolve the issue. She reports she has seen a neurologist in the past for syncope but does not recall what what said. She reports that she experiences edema in her extremities and around her eyes. She reports she was unable to metoprolol due to fatigue and bradycardia. She reports she has been taking gabapentin for about 8 months. She reports she stopped taking it a few days ago to see if this would make a difference, and her syncopal episodes have persisted. She reports she tried to stop her premarin for several days which did not help her syncopal episodes either.  Patient denies current SOB. She wore a AVTAR from 3/16-3/26/2021 which showed an average heart rate of 86, lowest 58, highest 168 bpm, sinus rhythm with tachycardia, very rare PAC's, and PVC's. at her last visit, she was referred to neurology. Today 4/13/2021, she she states she had bot been feeling well and has had 4-5 episodes of almost passing out. With one spell she was outside with her sister in law and was helping with landscaping. She started to feel strange and her sister in law voice sounded like in was at a distance. She had to lay on the ground in order not to pas out. 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 During this spell she had chest pain that radiated into her arms. She has been staying well hydrated but is also trying to loose weight. She states her weight has gone up 4 lbs (207-211) and has foot swelling. She reports having issues with low heart rates in the past on beta blockers. She will be seeing neurology this month. Patient underwent a stress test 5/5/2021 that showed ischemia. She underwent a LHC 5/7/2021 that did not show any evidence of CAD. On 5/14/2021, patient underwent a tilt table test study (normal physiologic response) and ILR removal 5/27/2021. ILR removal was complicated by ILR migrating to under fibrous breat tissues. Patient required sedation to aid with removal. Cellulitis post operatively requiring antibiotics and frequent post op site checks. On 5/27/2021, patient's ECG demonstrated SR 65 BPM. She reported her ILR site is still painful. She described the pain as \"very deep\". She had tried heat and ice for pain relief but this has not been helpful. It prevented her from sleeping. She reported the tramadol is not helping with pain control. She reported she received a referral for a breast surgeon from the ED, but is unsure if she should make an appointment.  She reported she had been waking up in the middle of the night with chest \"spasms\" and took her pulse while she was still laying down and her HR was 36 BPM. This resolved with activity. She reported that she feels her palpitations when her HR is very low or very high. She reported that she gets SOB with exertion when she walks more than 20 feet at a time. She reported she has been experiencing numbness, coldness and tingling  in both hands. At patient's visit 6/24/21, ECG demonstrates SR (75). She presented feeling very dizzy and feeling like her heart is racing, she appears diaphoretic. She stated that she has been having these dizzy spells on and of for about a week now. She stated that these episodes seem to have been exacerbated with a great deal of family stress at home. She stated she has been drinking 4-7, 16 oz bottles of water per day on average. She stated that she thinks her medication is helping with her chest pain and it has decreased her chest pain episodes. She stated her ILR site is healing well. ECG demonstrates 9/28/2021 SR 79 BPM. She reports her ILR explant site is healing well and is pain free. She reports her heart races when she gets up and walks around and it has been stressful for her. She reports that she experiences heart spasms that can last up to 20 minutes at a time. She is also very stressed at work. Her  feels that some of her stress is related to short term disability/FMLA. She was recently diagnosed with PTSD and anxiety and was started on Celexa and feels this has been somewhat helpful. She is also seeing a therapist. She reports things are going well at [employer]. She reports that she can tell if she misses a dose of verapamil. She feels the verapamil is helpful for her tachycardia and heart spasms. Patient wore a cardiac event monitor from 11/29/2021 to 12/10/2021 which demonstrated predominately SR with an average HR of 81 (). PAC burden 0.01%, PVC burden 0.01%. On 12/14/2021, ECG demonstrated SR (91).  She stated that she has had a lot of stress lately and her mother in law passed away, She states she has had several low HR's. Interval History since last office visit: Today, 5/17/2022, ECG demonstrates SR (84). 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 She never started her flecainide. She started doing therapies at her chiropractor office. She recently started having issues with activity due to palpitations. The palpitations and hard heart beats limit her physically. She has been working out and tolerates this fairly. She has occasional heart spasms. She is taking all other medications as prescribed. Patient denies current edema, chest pain, sob, dizziness or syncope. Past Medical History:   has a past medical history of Anemia, Anxiety, Heart abnormalities, Miscarriage, Ovarian cyst, and Tricuspid regurgitation. Past Surgical History:   has a past surgical history that includes Appendectomy (2006); Ovarian cyst surgery; Endometrial ablation (02/01/2017); Insertable Cardiac Monitor (05/24/2017); Hysterectomy; Cardiac catheterization (05/07/2021); and Hysterectomy, total abdominal.     Allergies:  Demerol, Adhesive tape, and Procardia [nifedipine]    Social History:   reports that she has never smoked. She has never used smokeless tobacco. She reports that she does not drink alcohol and does not use drugs. Family History: family history includes Arthritis in her mother; Breast Cancer in her maternal grandmother; Cancer in her maternal grandmother and paternal grandmother; Mental Retardation in her maternal uncle. Home Medications:    Prior to Admission medications    Medication Sig Start Date End Date Taking? Authorizing Provider   gabapentin (NEURONTIN) 100 MG capsule Take 2 capsules by mouth 3 times daily for 30 days.  TAKE TWO CAPSULES BY MOUTH THREE TIMES A DA 5/9/22 6/8/22 Yes Sawyer George MD   traMADol (ULTRAM) 50 MG tablet TAKE ONE TABLET BY MOUTH EVERY NIGHT AS NEEDED FOR PAIN FOR UP TO 30 DAYS, REDUCE AS PAIN BECOMES MANAGEBLE 5/9/22 6/8/22 Yes Sawyer George MD   furosemide (LASIX) 20 MG tablet Take 1 tablet by mouth daily as needed (edema) 3/8/22  Yes Sawyer George MD   nitroGLYCERIN (NITROSTAT) 0.3 MG SL tablet Place 1 tablet under the tongue every 5 minutes as needed for Chest pain up to max of 3 total doses. If no relief after 1 dose, call 911. 5/14/21  Yes Tre Kovacs MD   estrogens, conjugated, (PREMARIN) 1.25 MG tablet Take 1 tablet by mouth daily  Patient not taking: Reported on 5/19/2022 5/9/22   Sawyer George MD   amitriptyline (ELAVIL) 10 MG tablet Take 1 tablet by mouth nightly  Patient not taking: Reported on 5/19/2022 3/29/22   Sawyer George MD       REVIEW OF SYSTEMS:    All 14-point review of systems are completed and  pertinent positives are mentioned in the history of present illness. Other  systems are reviewed and are negative. Physical Examination:    /62   Pulse 84   Ht 5' 2\" (1.575 m)   Wt 214 lb 8 oz (97.3 kg)   LMP 08/07/2017 Comment: hyst 08/2017  SpO2 100%   BMI 39.23 kg/m²      Constitutional and General Appearance:    alert, cooperative, no distress and appears stated age  [de-identified]:    PERRLA, no cervical lymphadenopathy. No masses palpable. Normal oral mucosa  Respiratory:  · Normal excursion and expansion without use of accessory muscles  · Resp Auscultation: Normal breath sounds without dullness or wheezing  Cardiovascular:  · The apical impulse is not displaced  · RRR S1S2 w/o M/G/R  Abdomen:  · No masses or tenderness  · Bowel sounds present  Extremities:  ·  No Cyanosis or Clubbing  ·  Lower extremity edema: No  · Skin: Warm and dry  Neurological:  · Alert and oriented. · Moves all extremities well  · No abnormalities of mood, affect, memory, mentation, or behavior are noted    DATA:    ECG 5/19/22: Personally reviewed.      Stress test 5/5/2021  Summary There is a large area, moderate to severe, reversible perfusion defect  involving the anterior/anterolateral wall. FIndings are consistent with  inducible ischemia of the LAD territory. Hyperdynamic LV function with LVEF  > 70%. Overall findings represent a high risk scan. [facility] 5/7/2021  Impression:   1. Normal coronary arteries. 2. LVEDP 15 mmHg. Plan:   1. Optimal medical therapy for CAD risk factors. 2. Consider PFTs to evaluate for both obstructive and restrictive   lung disease. 3. It was recommend that she stop her keto diet and focus on   weight through a well-balanced heart healthy diet and regular   physical exercise for at least 30 minutes 3-5 times per week. 4. Follow-up with Ok Cazares in one month. Echo 4/9/2021  Summary   Limited study. Left ventricular cavity size is normal. Normal left   ventricular wall thickness. Overall left ventricular systolic function   appears normal with an ejection fraction of 55%. No regional wall motion   abnormalities are noted. Estimated pulmonary artery systolic pressure is at   31 mmHg assuming a right atrial pressure of 3 mmHg. IMPRESSION:    1. Syncope/presyncope/POTS/IST  03/16/2021  Patient is a pleasant 57-year-old female with a medical history significant for fibromyalgia, sinus tachycardia, and syncope/presyncope. She states that since the last time she saw us in 2018 she has been doing well until over the last 3 months when she began to suffer from recurrent symptoms. She previously was on metoprolol however discontinued as her symptoms improved in the past.  I am unclear as to what her symptoms are related to from a rhythm standpoint as her device shows no arrhythmias. I will asked that she wear a 30-day monitor and follow-up with us in clinic. I also asked that she follow-up with neurology as far as her migraines are concerned as this may be the true etiology of her symptoms.  - Thirty day monitor.  - Follow up with neurology.   - Limited echocardiogram for edema.  - Follow up with me in a month. 04/13/2021  Patient underwent echocardiogram which showed normal cardiac function. Her monitor showed rare PACs and PVCs that were not symptomatic. No significant arrhythmias noted on her monitor. Patient did have presyncope and syncopal events. None of these occurred while she was wearing her heart monitor. From a symptom standpoint appears to be vagal in nature. I will asked that she repeat a tilt table test.  Given her chest pain I will asked that she have a stress test as well as she is post hysterectomy with bilateral oophorectomies.  -Schedule exercise stress test.  - Schedule tilt table testing.  - Continue current course but consider fludrocortisone or midodrine after tilt table test.     5/27/2021  Patient is here for follow-up post loop monitor explantation. She continues to have some pain around her explantation site and just below where blister was unintentionally ruptured. We will give her some more pain medication some antibiotics to help with this. Will refer her to plastic surgery for evaluation. We will switch her from propranolol to verapamil for possible spasms. Does have some wrist discomfort bilaterally that is getting better post radial arterial cannulation. We will need to continue to monitor this. I will asked her to follow-up with me in 3 to 4 weeks. - Start doxycycline 100 mg BID. - Stop propanolol and switch to verapamil for spasms and palpitations. - Referral to breast surgery. - Follow up with me in 3-4 weeks. 6/24/2021  Patient presents for follow-up. As far as her breast concerned it has significantly improved and her scar appears to be healing well. Patient is a very in tune person with a strong mind-body connection, as given by her fibromyalgia. She has been under tremendous amount of stress and in my opinion this has contributed to her physical deterioration symptoms/body wise.   She has had some improvement with her verapamil however due to her mental status and pressures I believe that her body is significantly impaired. I will refer her to a psychiatrist and I am hopeful that in combination with the medical therapy decide prescribed that she will continue to improve. I will ask her to follow-up with me in 3 months. Relationship to her overall. I will extend her time off.  - Psychiatry referral.  - Continue verapamil.  - Follow up in 4 weeks. 9/27/2021  Patient clinically is improved. She is now on an anxiolytic and her symptoms are significant improvement along with her verapamil. She can tell when she is not taking her verapamil. We discussed options including increasing verapamil and or starting her on nitroglycerin sublingual for spasms. We decided to increase her verapamil to 180. If this does not improve her symptoms we will consider decreasing giving her hypotension and start her on some sublingual nitroglycerin while she is in a safe space given its response potentially triggering hypotension as well. - Increase verapamil to 180 mg daily.  - Follow up in 3 months. - Return this evening to fill out paper work for work. 12/14/2021  Patient reports having bradycardic events while on verapamil. Unfortunately they were not captured on her cardiac monitor she recently wore. Her monitor showed inappropriate sinus tachycardia she was in sinus tachycardia while at rest per her report. We discussed options including antiarrhythmic therapy without sayra agents given her reported bradycardia and sinus node modification. At this point patient is amenable to trying some antiarrhythmic therapy. Her left ventricular walls are thin on echocardiogram and she has no ischemic disease on nuclear stress test. We will start her on low-dose flecainide 50 mg twice a day. - Start on flecainide 50 mg BID.  - Follow up in 3 months.    5/19/2022  Patient presents for follow up.   She did well without starting on flecainide when she started work with chiropractor however symptoms have returned. She continues to try and exercise. 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 She is willing to try flecainide now given return of her symptoms.  - Start low dose flecainide 50 mg BID. - Start diltiazem 120 mg daily.  - Start magnesium.  - Follow up in 6 months. - Let us know next week how medical changes are working. 2. Migraines/altered mental state. Etiology unclear. - Plan as per above. 12/14/2021 - 05/17/2022  - Per neurology and PCP. 3. Chest pain   Atypical.  Normal stress test 04/2021.  - Continue to follow clinically. 5/17/2022  Plan as per above. 4. Peripheral edema     5/19/2022  - Continue to monitor clinically. 5. Dizziness  4/13/2021  Melita Montgomery is here today for follow up for syncope/presyncope. AVTAR from 3/2021 showed sinus rhythm with tachycardia, very rare PAC's, and PVC's. At her last visit she was referred to neurology. She has had 4-5 episodes of near syncope in the last 2 weeks. Last episode she had chest pain that radiated into her arms. She has a history ovary removal and then her uterus a few years later due to endometriosis. She would like to have her ILR removed. She is of work until 4/22/2021 due to her symptoms.   - Plan as per above. 5/27/2021 - 05/19/2022  - Plan as per above. RECOMMENDATIONS:   1. Start magnesium 400 mg daily. 2. Start flecainide 50 mg twice daily. 3. Start diltiazem 120 mg daily. 4. Let us know how you are tolerating these medications in about a week. 5. Follow up in 6 months, or sooner if needed. QUALITY MEASURES  1. Tobacco Cessation Counseling: NA  2. Retake of BP if >140/90:   NA  3. Documentation to PCP/referring for new patient:  Sent to PCP at close of office visit  4. CAD patient on anti-platelet: NA  5. CAD patient on STATIN therapy:  NA  6.  Patient with CHF and aFib on anticoagulation:  NA     All questions and concerns were addressed to the patient/family. Alternatives to my treatment were discussed. Jose Jay RN, am scribing for and in the presence of Dr. Nivia Hart. 05/19/22 9:35 AM   Jossie Abreu RN    I reviewed with the resident the medical history and the resident's findings on the physical examination. I discussed with the resident the patient's diagnosis and concur with the plan. Dr. Arlen Frank MD  [place] [address]. [address] [place] [address]. [address]. [facility] [address]  Phone: [phone]  Fax: [phone]     NOTE: This report was transcribed using voice recognition software. Every effort was made to ensure accuracy, however, inadvertent computerized transcription errors may be present.

## 2022-05-19 NOTE — PATIENT INSTRUCTIONS
RECOMMENDATIONS:   1. Start magnesium 400 mg daily. 2. Start flecainide 50 mg twice daily. 3. Start diltiazem 120 mg daily. 4. Let us know how you are tolerating these medications in about a week. 5. Follow up in 6 months, or sooner if needed.

## 2022-06-06 DIAGNOSIS — M79.7 FIBROMYALGIA: ICD-10-CM

## 2022-06-07 RX ORDER — FUROSEMIDE 20 MG/1
20 TABLET ORAL DAILY PRN
Qty: 90 TABLET | Refills: 1 | Status: SHIPPED | OUTPATIENT
Start: 2022-06-07 | End: 2022-06-27 | Stop reason: SDUPTHER

## 2022-06-07 RX ORDER — GABAPENTIN 100 MG/1
200 CAPSULE ORAL 3 TIMES DAILY
Qty: 180 CAPSULE | Refills: 0 | Status: SHIPPED | OUTPATIENT
Start: 2022-06-08 | End: 2022-06-27 | Stop reason: SDUPTHER

## 2022-06-07 RX ORDER — TRAMADOL HYDROCHLORIDE 50 MG/1
50 TABLET ORAL NIGHTLY
Qty: 30 TABLET | Refills: 0 | Status: SHIPPED | OUTPATIENT
Start: 2022-06-08 | End: 2022-06-27 | Stop reason: SDUPTHER

## 2022-06-27 DIAGNOSIS — R00.0 TACHYCARDIA: ICD-10-CM

## 2022-06-27 DIAGNOSIS — M79.7 FIBROMYALGIA: ICD-10-CM

## 2022-06-27 DIAGNOSIS — R00.2 PALPITATION: ICD-10-CM

## 2022-06-27 DIAGNOSIS — G90.A POTS (POSTURAL ORTHOSTATIC TACHYCARDIA SYNDROME): ICD-10-CM

## 2022-06-28 RX ORDER — FUROSEMIDE 20 MG/1
20 TABLET ORAL DAILY PRN
Qty: 90 TABLET | Refills: 1 | Status: SHIPPED | OUTPATIENT
Start: 2022-06-28

## 2022-06-28 RX ORDER — DILTIAZEM HYDROCHLORIDE 120 MG/1
120 CAPSULE, COATED, EXTENDED RELEASE ORAL DAILY
Qty: 90 CAPSULE | Refills: 3 | Status: SHIPPED | OUTPATIENT
Start: 2022-06-28 | End: 2022-09-03 | Stop reason: SDUPTHER

## 2022-06-28 RX ORDER — TRAMADOL HYDROCHLORIDE 50 MG/1
50 TABLET ORAL NIGHTLY
Qty: 30 TABLET | Refills: 0 | Status: SHIPPED | OUTPATIENT
Start: 2022-07-07 | End: 2022-08-06 | Stop reason: SDUPTHER

## 2022-06-28 RX ORDER — FLECAINIDE ACETATE 50 MG/1
50 TABLET ORAL 2 TIMES DAILY
Qty: 180 TABLET | Refills: 3 | Status: SHIPPED | OUTPATIENT
Start: 2022-06-28 | End: 2022-09-03 | Stop reason: SDUPTHER

## 2022-06-28 RX ORDER — GABAPENTIN 100 MG/1
200 CAPSULE ORAL 3 TIMES DAILY
Qty: 180 CAPSULE | Refills: 0 | Status: SHIPPED | OUTPATIENT
Start: 2022-07-07 | End: 2022-08-06 | Stop reason: SDUPTHER

## 2022-06-28 RX ORDER — MAGNESIUM OXIDE 400 MG/1
400 TABLET ORAL DAILY
Qty: 90 TABLET | Refills: 3 | Status: SHIPPED | OUTPATIENT
Start: 2022-06-28 | End: 2022-10-26 | Stop reason: SDUPTHER

## 2022-07-06 ENCOUNTER — OFFICE VISIT (OUTPATIENT)
Dept: INTERNAL MEDICINE CLINIC | Age: 32
End: 2022-07-06

## 2022-07-06 VITALS
HEART RATE: 70 BPM | RESPIRATION RATE: 18 BRPM | BODY MASS INDEX: 38.64 KG/M2 | HEIGHT: 62 IN | DIASTOLIC BLOOD PRESSURE: 75 MMHG | WEIGHT: 210 LBS | SYSTOLIC BLOOD PRESSURE: 115 MMHG

## 2022-07-06 DIAGNOSIS — Z01.818 PRE-OP EXAMINATION: Primary | ICD-10-CM

## 2022-07-06 DIAGNOSIS — M79.7 FIBROMYALGIA: ICD-10-CM

## 2022-07-06 DIAGNOSIS — R00.0 TACHYCARDIA: ICD-10-CM

## 2022-07-06 PROCEDURE — 99212 OFFICE O/P EST SF 10 MIN: CPT | Performed by: INTERNAL MEDICINE

## 2022-07-06 NOTE — PROGRESS NOTES
Subjective:      Rich Morse is a 27 y.o. female who presents to the office today for a preoperative consultation at the request of surgeon DR. Nikolas Zuniga  who plans on performing left ankle tendon injury repair  . Planned anesthesia is general and regional       27 y.o. female with hx of fibromyalgia, sinus tachycardia        hx of unexplained syncopal event at work leading to cardiology consultation, had previous loop recorder showing sinus tachycardia with PAC but no abnormal rhythm. Had abnormal stress test and subsequently had normal angiogram. Tilt table study was normal. Placed on verapamil by EP and reports no further syncope but has presyncopal events  Thought these events could be psychiatric in nature with increased stress .  Referred to  counsellor  Was started on celexa by us but later stopped with side effects    Recently changed her heart meds to cardizem and flecainide with good control   No side effects except for mild pedal edema     Fibromyalgia - Continues to report multiple somatic issues - low back pain ,bilateral feet pain which are progressive   Reports tramadol has helped some but not enough  Currently seeing podiatry in [facility] and had MRI done     No recent infections , no previous pulm issues    Working out to lose [employer] with 3 children and       Past Medical History:   Diagnosis Date    Anemia     Currently on Iron PO    Anxiety     Heart abnormalities     \"heart flutters real fast\"    Miscarriage 2009    Ovarian cyst     Tricuspid regurgitation      Patient Active Problem List    Diagnosis Date Noted    Chest pain     Abnormal stress test     Pedal edema 11/17/2020    Fibromyalgia 02/21/2020    Anxiety 02/21/2020    Blood pressure elevated without history of HTN 02/21/2020    H/O: hysterectomy 08/21/2017    Patellofemoral stress syndrome of both knees 05/09/2017    Tachycardia 01/11/2017    Syncope 01/11/2017    Palpitation 07/15/2016    POTS (postural orthostatic tachycardia syndrome) 2015     in first trimester 2013     Past Surgical History:   Procedure Laterality Date    APPENDECTOMY  2006    CARDIAC CATHETERIZATION  2021    WNL    ENDOMETRIAL ABLATION  2017    With Tubal ligation    HYSTERECTOMY      INSERTABLE CARDIAC MONITOR  2017    Loop Recorder Left Chest    OVARIAN CYST SURGERY      TOTAL ABDOMINAL HYSTERECTOMY       Family History   Problem Relation Age of Onset    Cancer Maternal Grandmother         Thyroid    Breast Cancer Maternal Grandmother     Cancer Paternal Grandmother     Arthritis Mother     Mental Retardation Maternal Uncle      Social History     Socioeconomic History    Marital status:      Spouse name: Not on file    Number of children: Not on file    Years of education: Not on file    Highest education level: Not on file   Occupational History    Not on file   Tobacco Use    Smoking status: Never Smoker    Smokeless tobacco: Never Used   Vaping Use    Vaping Use: Never used   Substance and Sexual Activity    Alcohol use: No    Drug use: No    Sexual activity: Yes     Partners: Male   Other Topics Concern    Not on file   Social History Narrative    Not on file     Social Determinants of Health     Financial Resource Strain:     Difficulty of Paying Living Expenses: Not on file   Food Insecurity:     Worried About Running Out of Food in the Last Year: Not on file    Víctor of Food in the Last Year: Not on file   Transportation Needs:     Lack of Transportation (Medical): Not on file    Lack of Transportation (Non-Medical):  Not on file   Physical Activity:     Days of Exercise per Week: Not on file    Minutes of Exercise per Session: Not on file   Stress:     Feeling of Stress : Not on file   Social Connections:     Frequency of Communication with Friends and Family: Not on file    Frequency of Social Gatherings with Friends and Family: Not on file  Attends [employer] Services: Not on file    Active Member of Clubs or Organizations: Not on file    Attends Club or Organization Meetings: Not on file    Marital Status: Not on file   Intimate Partner Violence:     Fear of Current or Ex-Partner: Not on file    Emotionally Abused: Not on file    Physically Abused: Not on file    Sexually Abused: Not on file   Housing Stability:     Unable to Pay for Housing in the Last Year: Not on file    Number of [place] in the Last Year: Not on file    Unstable Housing in the Last Year: Not on file     Current Outpatient Medications   Medication Sig Dispense Refill    estrogens, conjugated, (PREMARIN) 1.25 MG tablet Take 1 tablet by mouth daily 30 tablet 5    furosemide (LASIX) 20 MG tablet Take 1 tablet by mouth daily as needed (edema) 90 tablet 1    [START ON 7/7/2022] gabapentin (NEURONTIN) 100 MG capsule Take 2 capsules by mouth 3 times daily for 30 days. TAKE TWO CAPSULES BY MOUTH THREE TIMES A  capsule 0    [START ON 7/7/2022] traMADol (ULTRAM) 50 MG tablet Take 1 tablet by mouth at bedtime for 30 days. 30 tablet 0    magnesium oxide (MAG-OX) 400 MG tablet Take 1 tablet by mouth daily 90 tablet 3    flecainide (TAMBOCOR) 50 MG tablet Take 1 tablet by mouth 2 times daily 180 tablet 3    dilTIAZem (CARDIZEM CD) 120 MG extended release capsule Take 1 capsule by mouth daily 90 capsule 3    amitriptyline (ELAVIL) 10 MG tablet Take 1 tablet by mouth nightly (Patient not taking: Reported on 5/19/2022) 90 tablet 0    nitroGLYCERIN (NITROSTAT) 0.3 MG SL tablet Place 1 tablet under the tongue every 5 minutes as needed for Chest pain up to max of 3 total doses. If no relief after 1 dose, call 911. 30 tablet 3     No current facility-administered medications for this visit.      Allergies   Allergen Reactions    Demerol Other (See Comments)     Hallucinations, nausea, aka MEPERIDINE    Adhesive Tape Other (See Comments)     Blisters    Procardia surgery

## 2022-07-07 ENCOUNTER — TELEPHONE (OUTPATIENT)
Dept: CARDIOLOGY CLINIC | Age: 32
End: 2022-07-07

## 2022-07-07 NOTE — LETTER
[address] [facility] - [address]  Phone: [phone]  Fax: [phone]    Breanna Hayden MD        July 8, 2022    [facility] [place]   1990  [address] [facility] [address]      Dear To Whom This May Concern,    Yoni Smith is at low risk for MACE during a low risk procedure    If you have any questions or concerns, please don't hesitate to call.     Sincerely,        Breanna Hayden MD

## 2022-07-07 NOTE — TELEPHONE ENCOUNTER
Please help me generate letter and send to surgeon. Patient at low risk for MACE during a low risk procedure. Thanks.

## 2022-07-07 NOTE — TELEPHONE ENCOUNTER
CARDIAC CLEARANCE REQUEST    What type of procedure are you having:  Foot surgery     Are you taking any blood thinners: no    Type on anesthesia: general    When is your procedure scheduled for: 07/21/2022    What physician is performing your procedure: Dr. Geovanni Flaherty     Phone Number: [phone]    Fax number to send the letter: [phone]

## 2022-08-06 DIAGNOSIS — M79.7 FIBROMYALGIA: ICD-10-CM

## 2022-08-08 RX ORDER — GABAPENTIN 100 MG/1
200 CAPSULE ORAL 3 TIMES DAILY
Qty: 180 CAPSULE | Refills: 0 | Status: SHIPPED | OUTPATIENT
Start: 2022-08-08 | End: 2022-09-29

## 2022-08-08 RX ORDER — TRAMADOL HYDROCHLORIDE 50 MG/1
50 TABLET ORAL NIGHTLY
Qty: 30 TABLET | Refills: 0 | Status: SHIPPED | OUTPATIENT
Start: 2022-08-08 | End: 2022-09-03 | Stop reason: SDUPTHER

## 2022-08-08 RX ORDER — TRAMADOL HYDROCHLORIDE 50 MG/1
TABLET ORAL
Qty: 30 TABLET | Refills: 0 | OUTPATIENT
Start: 2022-08-08 | End: 2022-09-07

## 2022-09-03 DIAGNOSIS — R00.2 PALPITATION: ICD-10-CM

## 2022-09-03 DIAGNOSIS — R00.0 TACHYCARDIA: ICD-10-CM

## 2022-09-03 DIAGNOSIS — G90.A POTS (POSTURAL ORTHOSTATIC TACHYCARDIA SYNDROME): ICD-10-CM

## 2022-09-03 DIAGNOSIS — M79.7 FIBROMYALGIA: ICD-10-CM

## 2022-09-06 RX ORDER — FLECAINIDE ACETATE 50 MG/1
50 TABLET ORAL 2 TIMES DAILY
Qty: 180 TABLET | Refills: 1 | Status: SHIPPED | OUTPATIENT
Start: 2022-09-06 | End: 2022-10-26 | Stop reason: SDUPTHER

## 2022-09-06 RX ORDER — DILTIAZEM HYDROCHLORIDE 120 MG/1
120 CAPSULE, COATED, EXTENDED RELEASE ORAL DAILY
Qty: 90 CAPSULE | Refills: 1 | Status: SHIPPED | OUTPATIENT
Start: 2022-09-06 | End: 2022-10-26 | Stop reason: SDUPTHER

## 2022-09-06 RX ORDER — TRAMADOL HYDROCHLORIDE 50 MG/1
50 TABLET ORAL NIGHTLY
Qty: 30 TABLET | Refills: 0 | Status: SHIPPED | OUTPATIENT
Start: 2022-09-07 | End: 2022-09-29 | Stop reason: SDUPTHER

## 2022-09-07 ENCOUNTER — TELEPHONE (OUTPATIENT)
Dept: INTERNAL MEDICINE CLINIC | Age: 32
End: 2022-09-07

## 2022-09-07 DIAGNOSIS — Z78.0 POST-MENOPAUSAL: Primary | ICD-10-CM

## 2022-09-07 NOTE — TELEPHONE ENCOUNTER
----- Message from Elvia Hooper MD sent at 9/7/2022  8:26 AM EDT -----  Contact: Sharl Schaumann   [phone]  Then she needs dexa as bones are soft  Obtain Dexa - ordered    ----- Message -----  From: Dalila Jalloh  Sent: 9/7/2022   8:25 AM EDT  To: Elvia Hooper MD    Patient states when she had her foot surgery the doctor said her bones in her foot were really soft and recommended dexa scan be ordered by PCP.   Please advise.  ----- Message -----  From: Elvia Hooper MD  Sent: 9/7/2022   8:17 AM EDT  To: Dalila Jalloh    What is the reason    ----- Message -----  From: Hermelindo Pepe  Sent: 9/6/2022   3:13 PM EDT  To: Elvia Hooper MD    Patient would like a order put in to have a Dexa Scan                 [facility] 21 [id number] - [address] [facility], [address] [place] [address] [phone] - F [phone] (Ph: [phone])

## 2022-09-29 ENCOUNTER — OFFICE VISIT (OUTPATIENT)
Dept: INTERNAL MEDICINE CLINIC | Age: 32
End: 2022-09-29

## 2022-09-29 ENCOUNTER — TELEPHONE (OUTPATIENT)
Dept: INTERNAL MEDICINE CLINIC | Age: 32
End: 2022-09-29

## 2022-09-29 VITALS
BODY MASS INDEX: 39.38 KG/M2 | RESPIRATION RATE: 18 BRPM | SYSTOLIC BLOOD PRESSURE: 115 MMHG | HEART RATE: 70 BPM | DIASTOLIC BLOOD PRESSURE: 75 MMHG | WEIGHT: 214 LBS | HEIGHT: 62 IN

## 2022-09-29 DIAGNOSIS — M79.7 FIBROMYALGIA: ICD-10-CM

## 2022-09-29 DIAGNOSIS — F51.01 PRIMARY INSOMNIA: ICD-10-CM

## 2022-09-29 DIAGNOSIS — I10 BENIGN ESSENTIAL HTN: Primary | ICD-10-CM

## 2022-09-29 DIAGNOSIS — F41.9 ANXIETY: ICD-10-CM

## 2022-09-29 PROCEDURE — 99212 OFFICE O/P EST SF 10 MIN: CPT | Performed by: INTERNAL MEDICINE

## 2022-09-29 RX ORDER — GABAPENTIN 300 MG/1
300 CAPSULE ORAL 3 TIMES DAILY
Qty: 90 CAPSULE | Refills: 0 | Status: SHIPPED | OUTPATIENT
Start: 2022-09-29 | End: 2022-10-26 | Stop reason: SDUPTHER

## 2022-09-29 ASSESSMENT — ENCOUNTER SYMPTOMS: BACK PAIN: 1

## 2022-09-29 NOTE — TELEPHONE ENCOUNTER
----- Message from Leigha Charlton MD sent at 9/29/2022  1:32 PM EDT -----  300 mg tid    ----- Message -----  From: Tala Horn  Sent: 9/29/2022  12:11 PM EDT  To: Leigha Charlton MD    Pharmacy needing clarification on directions for Gabapentin. Should pt be taking 1 cap tid or 2 caps tid? Please advise.     Ju Linder

## 2022-09-29 NOTE — PROGRESS NOTES
Filippo Vasquez (:  1990) is a 34 y.o. female,Established patient, here for evaluation of the following chief complaint(s):  Follow-up Chronic Condition        SUBJECTIVE/OBJECTIVE:    HPI    34 y.o. female with hx of fibromyalgia, sinus tachycardia here for regular f/w     Since last time pt had left foot surgery and reports she is having PT and still in boot. Wound healed completely except for one area        hx of unexplained syncopal event at work leading to cardiology consultation, had previous loop recorder showing sinus tachycardia with PAC but no abnormal rhythm. Had abnormal stress test and subsequently had normal angiogram. Tilt table study was normal. Placed on verapamil by EP and reports no further syncope but has presyncopal events  Thought these events could be psychiatric in nature with increased stress . Referred to  counsellor  Was started on celexa by us but later stopped with side effects    Currently on cardizem and flecainide with good control of symptoms   No side effects except for mild pedal edema     Fibromyalgia - Continues to report multiple somatic issues - low back pain bilateral thigh pains ,bilateral feet pain which are progressive   Reports tramadol and gabapentin has helped some but not enough      No recent infections , no previous pulm issues    Working out to lose [employer]    Lives with 3 children and       Working out to lose [employer]-     Lives with 3 children and     Allergies   Allergen Reactions    Demerol Other (See Comments)     Hallucinations, nausea, aka MEPERIDINE    Adhesive Tape Other (See Comments)     Blisters    Procardia [Nifedipine] Rash     Current Outpatient Medications   Medication Sig Dispense Refill    gabapentin (NEURONTIN) 300 MG capsule Take 1 capsule by mouth 3 times daily for 30 days. TAKE TWO CAPSULES BY MOUTH THREE TIMES A DAY 90 capsule 0    traMADol (ULTRAM) 50 MG tablet Take 1 tablet by mouth at bedtime for 30 days.  30 tablet 0 flecainide (TAMBOCOR) 50 MG tablet Take 1 tablet by mouth 2 times daily 180 tablet 1    dilTIAZem (CARDIZEM CD) 120 MG extended release capsule Take 1 capsule by mouth daily 90 capsule 1    estrogens, conjugated, (PREMARIN) 1.25 MG tablet Take 1 tablet by mouth in the morning. 30 tablet 5    furosemide (LASIX) 20 MG tablet Take 1 tablet by mouth daily as needed (edema) 90 tablet 1    magnesium oxide (MAG-OX) 400 MG tablet Take 1 tablet by mouth daily 90 tablet 3    nitroGLYCERIN (NITROSTAT) 0.3 MG SL tablet Place 1 tablet under the tongue every 5 minutes as needed for Chest pain up to max of 3 total doses. If no relief after 1 dose, call 911. 30 tablet 3     No current facility-administered medications for this visit. Review of Systems   Musculoskeletal:  Positive for arthralgias, back pain, gait problem, joint swelling and myalgias. Psychiatric/Behavioral:  Negative for dysphoric mood. Constitutional: Negative for fever or chills + chronic fatigue  HENT: Negative for sore throat   Eyes: Negative for redness   Respiratory: Negative  for dyspnea, cough   Cardiovascular: Negative for chest pain   Gastrointestinal:neg for abd pain, nausea or vomiting or diarrhea  No melena or BRPR  Genitourinary: Negative for hematuria   Musculoskeletal: +ve  for arthralgias   Skin: Negative for rash   Neurological: none   Hematological: Negative for adenopathy   Psychiatric/Behavorial: Negative for anxiety        Physical Exam     Vitals:    09/29/22 1052   BP: 115/75   Pulse: 70   Resp: 18           General:  Young female, healthy appearing Awake, alert and oriented.  Appears to be not in any distress  Mucous Membranes:  Pink , anicteric  Neck: No JVD, no carotid bruit, no thyromegaly  Chest:  Clear to auscultation bilaterally, no added sounds  Cardiovascular:  RRR S1S2 heard, no murmurs or gallops  Abdomen:  Soft, obese, undistended, non tender, no organomegaly, BS present  Extremities: No edema or cyanosis  left

## 2022-09-30 RX ORDER — TRAMADOL HYDROCHLORIDE 50 MG/1
50 TABLET ORAL NIGHTLY
Qty: 30 TABLET | Refills: 0 | Status: SHIPPED | OUTPATIENT
Start: 2022-10-06 | End: 2022-10-26 | Stop reason: SDUPTHER

## 2022-10-24 ENCOUNTER — HOSPITAL ENCOUNTER (EMERGENCY)
Age: 32
Discharge: HOME | End: 2022-10-24
Payer: COMMERCIAL

## 2022-10-24 ENCOUNTER — APPOINTMENT (OUTPATIENT)
Dept: GENERAL RADIOLOGY | Age: 32
End: 2022-10-24
Payer: COMMERCIAL

## 2022-10-24 VITALS
DIASTOLIC BLOOD PRESSURE: 81 MMHG | SYSTOLIC BLOOD PRESSURE: 118 MMHG | OXYGEN SATURATION: 99 % | TEMPERATURE: 98.4 F | RESPIRATION RATE: 16 BRPM | HEART RATE: 81 BPM

## 2022-10-24 DIAGNOSIS — R05.1 ACUTE COUGH: ICD-10-CM

## 2022-10-24 DIAGNOSIS — J02.9 SORE THROAT: ICD-10-CM

## 2022-10-24 DIAGNOSIS — G90.A POTS (POSTURAL ORTHOSTATIC TACHYCARDIA SYNDROME): ICD-10-CM

## 2022-10-24 DIAGNOSIS — R06.02 SHORTNESS OF BREATH: ICD-10-CM

## 2022-10-24 DIAGNOSIS — J98.8 VIRAL RESPIRATORY ILLNESS: Primary | ICD-10-CM

## 2022-10-24 DIAGNOSIS — B97.89 VIRAL RESPIRATORY ILLNESS: Primary | ICD-10-CM

## 2022-10-24 LAB
A/G RATIO: 1.3 (ref 1.1–2.2)
ALBUMIN SERPL-MCNC: 3.8 G/DL (ref 3.4–5)
ALP BLD-CCNC: 95 U/L (ref 40–129)
ALT SERPL-CCNC: 23 U/L (ref 10–40)
ANION GAP SERPL CALCULATED.3IONS-SCNC: 11 MMOL/L (ref 3–16)
AST SERPL-CCNC: 25 U/L (ref 15–37)
BASOPHILS ABSOLUTE: 0.1 K/UL (ref 0–0.2)
BASOPHILS RELATIVE PERCENT: 1.2 %
BILIRUB SERPL-MCNC: <0.2 MG/DL (ref 0–1)
BILIRUBIN URINE: NEGATIVE
BLOOD, URINE: NEGATIVE
BUN BLDV-MCNC: 13 MG/DL (ref 7–20)
CALCIUM SERPL-MCNC: 9.7 MG/DL (ref 8.3–10.6)
CHLORIDE BLD-SCNC: 102 MMOL/L (ref 99–110)
CLARITY: CLEAR
CO2: 25 MMOL/L (ref 21–32)
COLOR: YELLOW
CREAT SERPL-MCNC: 0.9 MG/DL (ref 0.6–1.1)
D DIMER: 0.45 UG/ML FEU (ref 0–0.6)
EKG ATRIAL RATE: 79 BPM
EKG DIAGNOSIS: NORMAL
EKG P AXIS: 55 DEGREES
EKG P-R INTERVAL: 158 MS
EKG Q-T INTERVAL: 372 MS
EKG QRS DURATION: 84 MS
EKG QTC CALCULATION (BAZETT): 426 MS
EKG R AXIS: 44 DEGREES
EKG T AXIS: 49 DEGREES
EKG VENTRICULAR RATE: 79 BPM
EOSINOPHILS ABSOLUTE: 0.2 K/UL (ref 0–0.6)
EOSINOPHILS RELATIVE PERCENT: 2.8 %
GFR SERPL CREATININE-BSD FRML MDRD: >60 ML/MIN/{1.73_M2}
GLUCOSE BLD-MCNC: 97 MG/DL (ref 70–99)
GLUCOSE URINE: NEGATIVE MG/DL
HCT VFR BLD CALC: 39.1 % (ref 36–48)
HEMOGLOBIN: 13.6 G/DL (ref 12–16)
KETONES, URINE: 15 MG/DL
LEUKOCYTE ESTERASE, URINE: NEGATIVE
LYMPHOCYTES ABSOLUTE: 1.5 K/UL (ref 1–5.1)
LYMPHOCYTES RELATIVE PERCENT: 19.6 %
MCH RBC QN AUTO: 31.4 PG (ref 26–34)
MCHC RBC AUTO-ENTMCNC: 34.8 G/DL (ref 31–36)
MCV RBC AUTO: 90.2 FL (ref 80–100)
MICROSCOPIC EXAMINATION: ABNORMAL
MONOCYTES ABSOLUTE: 0.5 K/UL (ref 0–1.3)
MONOCYTES RELATIVE PERCENT: 6.7 %
NEUTROPHILS ABSOLUTE: 5.3 K/UL (ref 1.7–7.7)
NEUTROPHILS RELATIVE PERCENT: 69.7 %
NITRITE, URINE: NEGATIVE
PDW BLD-RTO: 12.8 % (ref 12.4–15.4)
PH UA: 7 (ref 5–8)
PLATELET # BLD: 320 K/UL (ref 135–450)
PMV BLD AUTO: 7.8 FL (ref 5–10.5)
POTASSIUM REFLEX MAGNESIUM: 3.9 MMOL/L (ref 3.5–5.1)
PRO-BNP: 46 PG/ML (ref 0–124)
PROTEIN UA: NEGATIVE MG/DL
RAPID INFLUENZA  B AGN: NEGATIVE
RAPID INFLUENZA A AGN: NEGATIVE
RBC # BLD: 4.33 M/UL (ref 4–5.2)
S PYO AG THROAT QL: NEGATIVE
SARS-COV-2, NAAT: NOT DETECTED
SODIUM BLD-SCNC: 138 MMOL/L (ref 136–145)
SPECIFIC GRAVITY UA: 1.02 (ref 1–1.03)
TOTAL PROTEIN: 6.8 G/DL (ref 6.4–8.2)
TROPONIN: <0.01 NG/ML
URINE REFLEX TO CULTURE: ABNORMAL
URINE TYPE: ABNORMAL
UROBILINOGEN, URINE: 0.2 E.U./DL
WBC # BLD: 7.6 K/UL (ref 4–11)

## 2022-10-24 PROCEDURE — 87635 SARS-COV-2 COVID-19 AMP PRB: CPT

## 2022-10-24 PROCEDURE — 36415 COLL VENOUS BLD VENIPUNCTURE: CPT

## 2022-10-24 PROCEDURE — 85379 FIBRIN DEGRADATION QUANT: CPT

## 2022-10-24 PROCEDURE — 71045 X-RAY EXAM CHEST 1 VIEW: CPT

## 2022-10-24 PROCEDURE — 85025 COMPLETE CBC W/AUTO DIFF WBC: CPT

## 2022-10-24 PROCEDURE — 99285 EMERGENCY DEPT VISIT HI MDM: CPT

## 2022-10-24 PROCEDURE — 87081 CULTURE SCREEN ONLY: CPT

## 2022-10-24 PROCEDURE — 80053 COMPREHEN METABOLIC PANEL: CPT

## 2022-10-24 PROCEDURE — 87880 STREP A ASSAY W/OPTIC: CPT

## 2022-10-24 PROCEDURE — 84484 ASSAY OF TROPONIN QUANT: CPT

## 2022-10-24 PROCEDURE — 93005 ELECTROCARDIOGRAM TRACING: CPT | Performed by: PHYSICIAN ASSISTANT

## 2022-10-24 PROCEDURE — 83880 ASSAY OF NATRIURETIC PEPTIDE: CPT

## 2022-10-24 PROCEDURE — 93010 ELECTROCARDIOGRAM REPORT: CPT | Performed by: INTERNAL MEDICINE

## 2022-10-24 PROCEDURE — 81003 URINALYSIS AUTO W/O SCOPE: CPT

## 2022-10-24 PROCEDURE — 87804 INFLUENZA ASSAY W/OPTIC: CPT

## 2022-10-24 RX ORDER — BENZONATATE 100 MG/1
100 CAPSULE ORAL 3 TIMES DAILY PRN
Qty: 15 CAPSULE | Refills: 0 | Status: SHIPPED | OUTPATIENT
Start: 2022-10-24 | End: 2022-10-29

## 2022-10-24 ASSESSMENT — ENCOUNTER SYMPTOMS
SORE THROAT: 1
SHORTNESS OF BREATH: 1
ABDOMINAL PAIN: 0
CHEST TIGHTNESS: 1
COUGH: 1
VOMITING: 0

## 2022-10-24 NOTE — ED PROVIDER NOTES
[facility]        Pt Name: Nahomi Javier  MRN: [id number]  [place] 1990  Date of evaluation: 10/24/2022  Provider: Brooke Bragg PA-C  PCP: Alba Rivas MD    Shared Visit or Autonomous Visit: MERLYN. I have evaluated this patient. My supervising physician was available for consultation. CHIEF COMPLAINT       Chief Complaint   Patient presents with    Cough     Pt endorses cough, shortness of breath, scratchy throat and body aches that began on Saturday. Her daughter has strep. Tachycardia     Pt with hx of POTS, states that her tachycardia has been worse since her symptoms started. HISTORY OF PRESENT ILLNESS   (Location/Symptom, Timing/Onset, Context/Setting, Quality, Duration, Modifying Factors, Severity)  Note limiting factors. Nahomi Javier is a 34 y.o. female presenting to the emergency department for evaluation of URI symptoms dry cough, sore throat, hoarse voice, shortness of breath, chest tightness symptoms started at the end of last week but really noticed symptoms more on Saturday. Her daughter recently had strep throat the past week. And her  also has a cough. Also since Saturday chest tightness and burning in her chest and increased shortness of breath. Dry cough. States got up this morning and she was swollen in her hands and feet. Recent surgery on left foot 3 months ago. No calf pain. After she took a shower today she felt lightheaded states her heart rate went up to 140s. She has a history of POTS. States her tachycardia always gets worse when she gets sick. Normal left heart cath last year. Non-smoker. Has hx HTN. No Hx hyperlipidemia. The history is provided by the patient.    URI  Presenting symptoms: cough, fatigue and sore throat    Presenting symptoms: no fever    Duration:  3 days  Timing:  Constant  Progression:  Waxing and waning  Chronicity:  New  Risk factors: sick contacts    Risk factors: no chronic respiratory disease    Palpitations  Palpitations quality:  Fast  Onset quality:  Sudden  Duration: minutes. Progression:  Resolved  Chronicity:  Chronic  Associated symptoms: chest pain, cough, lower extremity edema and shortness of breath    Associated symptoms: no numbness, no syncope and no vomiting    Risk factors: no diabetes mellitus, no heart disease, no hx of atrial fibrillation, no hx of DVT and no hx of PE      Nursing Notes were reviewed    REVIEW OF SYSTEMS    (2-9 systems for level 4, 10 or more for level 5)     Review of Systems   Constitutional:  Positive for chills and fatigue. Negative for fever. HENT:  Positive for sore throat. Respiratory:  Positive for cough, chest tightness and shortness of breath. Cardiovascular:  Positive for chest pain and palpitations. Negative for syncope. Gastrointestinal:  Negative for abdominal pain and vomiting. Neurological:  Positive for light-headedness. Negative for syncope and numbness. All other systems reviewed and are negative. Positives and Pertinent negatives as per HPI.        PAST MEDICAL HISTORY     Past Medical History:   Diagnosis Date    Anemia     Currently on Iron PO    Anxiety     Heart abnormalities     \"heart flutters real fast\"    Miscarriage 2009    Ovarian cyst     Tricuspid regurgitation          SURGICAL HISTORY     Past Surgical History:   Procedure Laterality Date    APPENDECTOMY  2006    CARDIAC CATHETERIZATION  05/07/2021    WNL    ENDOMETRIAL ABLATION  02/01/2017    With Tubal ligation    HYSTERECTOMY (CERVIX STATUS UNKNOWN)      HYSTERECTOMY, TOTAL ABDOMINAL (CERVIX REMOVED)      INSERTABLE CARDIAC MONITOR  05/24/2017    Loop Recorder Left Chest    OVARIAN CYST SURGERY           CURRENTMEDICATIONS       Discharge Medication List as of 10/24/2022  4:39 PM        CONTINUE these medications which have NOT CHANGED    Details   traMADol (ULTRAM) 50 MG tablet Take 1 tablet by mouth at bedtime for 30 days. , Disp-30 tablet, R-0Normal      gabapentin (NEURONTIN) 300 MG capsule Take 1 capsule by mouth 3 times daily for 30 days. TAKE TWO CAPSULES BY MOUTH THREE TIMES A DAY, Disp-90 capsule, R-0Normal      flecainide (TAMBOCOR) 50 MG tablet Take 1 tablet by mouth 2 times daily, Disp-180 tablet, R-1Normal      dilTIAZem (CARDIZEM CD) 120 MG extended release capsule Take 1 capsule by mouth daily, Disp-90 capsule, R-1Normal      estrogens, conjugated, (PREMARIN) 1.25 MG tablet Take 1 tablet by mouth in the morning., Disp-30 tablet, R-5Normal      furosemide (LASIX) 20 MG tablet Take 1 tablet by mouth daily as needed (edema), Disp-90 tablet, R-1Normal      magnesium oxide (MAG-OX) 400 MG tablet Take 1 tablet by mouth daily, Disp-90 tablet, R-3Normal      nitroGLYCERIN (NITROSTAT) 0.3 MG SL tablet Place 1 tablet under the tongue every 5 minutes as needed for Chest pain up to max of 3 total doses.  If no relief after 1 dose, call 911., Disp-30 tablet, R-3Normal               ALLERGIES     Demerol, Adhesive tape, and Procardia [nifedipine]    FAMILYHISTORY       Family History   Problem Relation Age of Onset    Cancer Maternal Grandmother         Thyroid    Breast Cancer Maternal Grandmother     Cancer Paternal Grandmother     Arthritis Mother     Mental Retardation Maternal Uncle           SOCIAL HISTORY       Social History     Socioeconomic History    Marital status:    Tobacco Use    Smoking status: Never    Smokeless tobacco: Never   Vaping Use    Vaping Use: Never used   Substance and Sexual Activity    Alcohol use: No    Drug use: No    Sexual activity: Yes     Partners: Male       SCREENINGS    Venetia Coma Scale  Eye Opening: Spontaneous  Best Verbal Response: Oriented  Best Motor Response: Obeys commands  Vannessa Coma Scale Score: 15        PHYSICAL EXAM    (up to 7 for level 4, 8 or more for level 5)     ED Triage Vitals [10/24/22 1424]   BP Temp Temp Source Heart Rate Resp SpO2 Height Weight 120/83 98.4 °F (36.9 °C) Oral 87 16 98 % -- --       Physical Exam  Vitals and nursing note reviewed. Constitutional:       Appearance: She is well-developed. She is not toxic-appearing. HENT:      Head: Normocephalic and atraumatic. Right Ear: Tympanic membrane and ear canal normal.      Left Ear: Tympanic membrane and ear canal normal.      Mouth/Throat:      Mouth: Mucous membranes are moist.      Pharynx: Oropharynx is clear. Uvula midline. Posterior oropharyngeal erythema present. No pharyngeal swelling, oropharyngeal exudate or uvula swelling. Tonsils: No tonsillar exudate or tonsillar abscesses. Comments: Hoarse voice  Eyes:      Conjunctiva/sclera: Conjunctivae normal.      Pupils: Pupils are equal, round, and reactive to light. Neck:      Vascular: No JVD. Cardiovascular:      Rate and Rhythm: Normal rate and regular rhythm. Pulmonary:      Effort: Pulmonary effort is normal. No respiratory distress. Breath sounds: Normal breath sounds. No stridor. No wheezing or rales. Abdominal:      General: Bowel sounds are normal. There is no distension. Palpations: Abdomen is soft. Abdomen is not rigid. There is no mass. Tenderness: There is no abdominal tenderness. There is no guarding or rebound. Musculoskeletal:         General: Normal range of motion. Cervical back: Normal range of motion and neck supple. Comments: Very mild bilateral ankle edema. No calf tenderness. Skin:     General: Skin is warm and dry. Findings: No rash. Neurological:      Mental Status: She is alert and oriented to person, place, and time. GCS: GCS eye subscore is 4. GCS verbal subscore is 5. GCS motor subscore is 6. Cranial Nerves: No cranial nerve deficit. Sensory: No sensory deficit. Motor: No abnormal muscle tone. Coordination: Coordination normal.   Psychiatric:         Behavior: Behavior normal. Behavior is cooperative.        DIAGNOSTIC RESULTS LABS:    Labs Reviewed   URINALYSIS WITH REFLEX TO CULTURE - Abnormal; Notable for the following components:       Result Value    Ketones, Urine 15 (*)     All other components within normal limits   COVID-19, RAPID   RAPID INFLUENZA A/B ANTIGENS   STREP SCREEN GROUP A THROAT   CULTURE, BETA STREP CONFIRM PLATES   CBC WITH AUTO DIFFERENTIAL   COMPREHENSIVE METABOLIC PANEL W/ REFLEX TO MG FOR LOW K   TROPONIN   BRAIN NATRIURETIC PEPTIDE   D-DIMER, QUANTITATIVE       Results for orders placed or performed during the hospital encounter of 10/24/22   COVID-19, Rapid    Specimen: Nasopharyngeal Swab   Result Value Ref Range    SARS-CoV-2, NAAT Not Detected Not Detected   Rapid influenza A/B antigens    Specimen: Nasopharyngeal   Result Value Ref Range    Rapid Influenza A Ag Negative Negative    Rapid Influenza B Ag Negative Negative   Strep screen group a throat    Specimen: Throat   Result Value Ref Range    Rapid Strep A Screen Negative Negative   CBC with Auto Differential   Result Value Ref Range    WBC 7.6 4.0 - 11.0 K/uL    RBC 4.33 4.00 - 5.20 M/uL    Hemoglobin 13.6 12.0 - 16.0 g/dL    Hematocrit 39.1 36.0 - 48.0 %    MCV 90.2 80.0 - 100.0 fL    MCH 31.4 26.0 - 34.0 pg    MCHC 34.8 31.0 - 36.0 g/dL    RDW 12.8 12.4 - 15.4 %    Platelets 867 448 - 671 K/uL    MPV 7.8 5.0 - 10.5 fL    Neutrophils % 69.7 %    Lymphocytes % 19.6 %    Monocytes % 6.7 %    Eosinophils % 2.8 %    Basophils % 1.2 %    Neutrophils Absolute 5.3 1.7 - 7.7 K/uL    Lymphocytes Absolute 1.5 1.0 - 5.1 K/uL    Monocytes Absolute 0.5 0.0 - 1.3 K/uL    Eosinophils Absolute 0.2 0.0 - 0.6 K/uL    Basophils Absolute 0.1 0.0 - 0.2 K/uL   Comprehensive Metabolic Panel w/ Reflex to MG   Result Value Ref Range    Sodium 138 136 - 145 mmol/L    Potassium reflex Magnesium 3.9 3.5 - 5.1 mmol/L    Chloride 102 99 - 110 mmol/L    CO2 25 21 - 32 mmol/L    Anion Gap 11 3 - 16    Glucose 97 70 - 99 mg/dL    BUN 13 7 - 20 mg/dL    Creatinine 0.9 0.6 - 1.1 mg/dL    Est, Glom Filt Rate >60 >60    Calcium 9.7 8.3 - 10.6 mg/dL    Total Protein 6.8 6.4 - 8.2 g/dL    Albumin 3.8 3.4 - 5.0 g/dL    Albumin/Globulin Ratio 1.3 1.1 - 2.2    Total Bilirubin <0.2 0.0 - 1.0 mg/dL    Alkaline Phosphatase 95 40 - 129 U/L    ALT 23 10 - 40 U/L    AST 25 15 - 37 U/L   Troponin   Result Value Ref Range    Troponin <0.01 <0.01 ng/mL   Brain Natriuretic Peptide   Result Value Ref Range    Pro-BNP 46 0 - 124 pg/mL   D-Dimer, Quantitative   Result Value Ref Range    D-Dimer, Quant 0.45 0.00 - 0.60 ug/mL FEU   Urinalysis with Reflex to Culture    Specimen: Urine, clean catch   Result Value Ref Range    Color, UA Yellow Straw/Yellow    Clarity, UA Clear Clear    Glucose, Ur Negative Negative mg/dL    Bilirubin Urine Negative Negative    Ketones, Urine 15 (A) Negative mg/dL    Specific Gravity, UA 1.020 1.005 - 1.030    Blood, Urine Negative Negative    pH, UA 7.0 5.0 - 8.0    Protein, UA Negative Negative mg/dL    Urobilinogen, Urine 0.2 <2.0 E.U./dL    Nitrite, Urine Negative Negative    Leukocyte Esterase, Urine Negative Negative    Microscopic Examination Not Indicated     Urine Type NotGiven     Urine Reflex to Culture Not Indicated    EKG 12 Lead   Result Value Ref Range    Ventricular Rate 79 BPM    Atrial Rate 79 BPM    P-R Interval 158 ms    QRS Duration 84 ms    Q-T Interval 372 ms    QTc Calculation (Bazett) 426 ms    P Axis 55 degrees    R Axis 44 degrees    T Axis 49 degrees    Diagnosis       Normal sinus rhythmCannot rule out Anterior infarct , age undeterminedAbnormal ECGWhen compared with ECG of 17-MAY-2021 13:04,No significant change was foundConfirmed by Maritza Dubose MD, [address] ([id number]) on 10/24/2022 6:09:02 PM       All other labs were not returned as of this dictation. EKG: All EKG's are interpreted by the Emergency Department Physician in the absence of a cardiologist.  Please see their note for interpretation of EKG.       RADIOLOGY:   Non-plain film images such as CT, Ultrasound and MRI are read by the radiologist. Plain radiographic images are visualized andpreliminarily interpreted by the  ED Provider with the below findings:        Interpretation perthe Radiologist below, if available at the time of this note:    XR CHEST PORTABLE   Final Result   No radiographic evidence of acute pulmonary disease. XR CHEST PORTABLE    Result Date: 10/24/2022  EXAMINATION: ONE XRAY VIEW OF THE CHEST 10/24/2022 2:56 pm COMPARISON: Chest x-ray dated 05/17/2021. HISTORY: ORDERING SYSTEM PROVIDED HISTORY: chest pain, sob, cough TECHNOLOGIST PROVIDED HISTORY: Reason for exam:->chest pain, sob, cough Reason for Exam: cough, tachycardia, hx pots FINDINGS: HEART/MEDIASTINUM: The cardiomediastinal silhouette is within normal limits. PLEURA/LUNGS: There are no focal consolidations or pleural effusions. There is no appreciable pneumothorax. BONES/SOFT TISSUE: No acute abnormality. No radiographic evidence of acute pulmonary disease. PROCEDURES   Unless otherwise noted below, none     Procedures    CRITICAL CARE TIME   Critical care provided for this patient of which 0 min were spend on critical care and decision making. 0 min spent on procedures. There was imminent failure of an organ system which required critical intervention to prevent clinically significant progression of life threatening deterioration of the patient's condition to the point of disability or death. CONSULTS:  None      EMERGENCY DEPARTMENT COURSE and DIFFERENTIAL DIAGNOSIS/MDM:   Vitals:    Vitals:    10/24/22 1424 10/24/22 1543 10/24/22 1635 10/24/22 1644   BP: 120/83   118/81   Pulse: 87 80 85 81   Resp: 16   16   Temp: 98.4 °F (36.9 °C)      TempSrc: Oral      SpO2: 98% 99% 99% 99%       Patient was given thefollowing medications:  Medications - No data to display    Is this patient to be included in the SEP-1 Core Measure due to severe sepsis or septic shock?    No   Exclusion criteria - the patient is NOT to be included for SEP-1 Core Measure due to:  Viral etiology found or highly suspected (including COVID-19) without concomitant bacterial infection     HEART SCORE:        0-3 Adverse outcome risk: 2.5% (very low to low risk) Supports early discharge with appropriate follow-up   4-6 Adverse outcome risk: 20.3% (moderate risk) Supports admission with standard rule-out management and stress testing   7-10 Adverse outcome risk: 72.7% (high to very high risk) Risk in first 30 days >50% Supports early aggressive management and typically with cardiac catheterization       Heart score: 1. This falls under the following category: Score of 0-3, which indicates a very low risk for major adverse cardiac event and supports early discharge      ED course  Patient presented to the ER for evaluation of URI symptoms with cough and chest pain states today she had elevated heart rate. She does have a history of POTS. States her POTS gets worse when she is sick. Heart rate has been in the 80s here. EKG showing normal sinus rhythm no significant change from previous. Troponin is normal.  D-dimer within normal limits. Do not suspect PE. Chest x-ray is unremarkable no signs of pneumonia. BNP normal.  Rapid COVID flu and strep screens are negative. Suspect viral respiratory illness. She is stable for discharge home and close follow-up with her doctor symptomatic treatment. Return to the ER for any worsening symptoms. She understands and agrees. Tessalon prescribed as needed for cough. I estimate there is LOW risk for PNEUMONIA, MENINGITIS, PERITONSILLAR ABSCESS, SEPSIS, MALIGNANT OTITIS EXTERNA, OR EPIGLOTTITIS thus I consider the discharge disposition reasonable. I estimate there is LOW risk for PULMONARY EMBOLISM, ACUTE CORONARY SYNDROME, OR THORACIC AORTIC DISSECTION, thus I consider the discharge disposition reasonable. FINAL IMPRESSION      1. Viral respiratory illness    2. Acute cough    3.  Shortness

## 2022-10-26 DIAGNOSIS — R00.0 TACHYCARDIA: ICD-10-CM

## 2022-10-26 DIAGNOSIS — R00.2 PALPITATION: ICD-10-CM

## 2022-10-26 DIAGNOSIS — G90.A POTS (POSTURAL ORTHOSTATIC TACHYCARDIA SYNDROME): ICD-10-CM

## 2022-10-26 DIAGNOSIS — M79.7 FIBROMYALGIA: ICD-10-CM

## 2022-10-27 LAB — S PYO THROAT QL CULT: NORMAL

## 2022-10-27 RX ORDER — TRAMADOL HYDROCHLORIDE 50 MG/1
50 TABLET ORAL NIGHTLY
Qty: 30 TABLET | Refills: 0 | Status: SHIPPED | OUTPATIENT
Start: 2022-11-03 | End: 2022-11-28 | Stop reason: SDUPTHER

## 2022-10-27 RX ORDER — DILTIAZEM HYDROCHLORIDE 120 MG/1
120 CAPSULE, COATED, EXTENDED RELEASE ORAL DAILY
Qty: 90 CAPSULE | Refills: 1 | Status: SHIPPED | OUTPATIENT
Start: 2022-10-27 | End: 2022-11-26 | Stop reason: SDUPTHER

## 2022-10-27 RX ORDER — FLECAINIDE ACETATE 50 MG/1
50 TABLET ORAL 2 TIMES DAILY
Qty: 180 TABLET | Refills: 1 | Status: SHIPPED | OUTPATIENT
Start: 2022-10-27 | End: 2022-11-28 | Stop reason: SDUPTHER

## 2022-10-27 RX ORDER — GABAPENTIN 300 MG/1
300 CAPSULE ORAL 3 TIMES DAILY
Qty: 90 CAPSULE | Refills: 0 | Status: SHIPPED
Start: 2022-10-29 | End: 2022-10-28 | Stop reason: CLARIF

## 2022-10-27 NOTE — TELEPHONE ENCOUNTER
[facility] OOT    LOV 05/19/2022 w/ SEAN  EKG 10/24/2022  CMP 10/24/2022  Upcoming appt 11/03/2022 w/ VALENTIN

## 2022-10-28 RX ORDER — GABAPENTIN 300 MG/1
600 CAPSULE ORAL 3 TIMES DAILY
Qty: 180 CAPSULE | Refills: 0 | Status: SHIPPED | OUTPATIENT
Start: 2022-10-29 | End: 2022-11-28 | Stop reason: SDUPTHER

## 2022-10-28 NOTE — TELEPHONE ENCOUNTER
----- Message from Matty Espino MD sent at 10/28/2022  1:13 PM EDT -----  Contact: Mando Pulido. 2tab tid    ----- Message -----  From: Mikal Talavera  Sent: 10/28/2022  12:18 PM EDT  To: Matty Espino MD    2 set of directions on gabapentin. Should it be 300 mg TID and 300 mg two tablets TID?

## 2022-10-28 NOTE — TELEPHONE ENCOUNTER
Attempted to contact [facility] multiple times, unable to get through to them.  New script sent

## 2022-10-31 RX ORDER — MAGNESIUM OXIDE 400 MG/1
400 TABLET ORAL DAILY
Qty: 90 TABLET | Refills: 1 | Status: SHIPPED | OUTPATIENT
Start: 2022-10-31

## 2022-11-26 DIAGNOSIS — R00.0 TACHYCARDIA: ICD-10-CM

## 2022-11-26 DIAGNOSIS — G90.A POTS (POSTURAL ORTHOSTATIC TACHYCARDIA SYNDROME): ICD-10-CM

## 2022-11-26 DIAGNOSIS — M79.7 FIBROMYALGIA: ICD-10-CM

## 2022-11-26 DIAGNOSIS — R00.2 PALPITATION: ICD-10-CM

## 2022-11-28 DIAGNOSIS — R00.2 PALPITATION: ICD-10-CM

## 2022-11-28 DIAGNOSIS — M79.7 FIBROMYALGIA: ICD-10-CM

## 2022-11-28 DIAGNOSIS — G90.A POTS (POSTURAL ORTHOSTATIC TACHYCARDIA SYNDROME): ICD-10-CM

## 2022-11-28 DIAGNOSIS — R00.0 TACHYCARDIA: ICD-10-CM

## 2022-11-28 RX ORDER — DILTIAZEM HYDROCHLORIDE 120 MG/1
120 CAPSULE, COATED, EXTENDED RELEASE ORAL DAILY
Qty: 90 CAPSULE | Refills: 1 | Status: SHIPPED | OUTPATIENT
Start: 2022-11-28

## 2022-11-28 RX ORDER — DILTIAZEM HYDROCHLORIDE 120 MG/1
120 CAPSULE, COATED, EXTENDED RELEASE ORAL DAILY
Qty: 90 CAPSULE | Refills: 0 | Status: SHIPPED | OUTPATIENT
Start: 2022-11-28

## 2022-11-28 RX ORDER — FLECAINIDE ACETATE 50 MG/1
50 TABLET ORAL 2 TIMES DAILY
Qty: 180 TABLET | Refills: 0 | Status: SHIPPED | OUTPATIENT
Start: 2022-11-28

## 2022-11-28 NOTE — TELEPHONE ENCOUNTER
Duplicate request for Diltiazem - please refuse    Last OV AGK 5/19/22  Upcoming OV NPNR 1/24/23  EKG 10/24/22  CMP 10/24/22

## 2022-11-28 NOTE — TELEPHONE ENCOUNTER
Please schedule pt first available OV with EP NP with device check, then route back to board. Thank you!      Last OV AGK 5/19/22 - Plan: pt to f/u in 6 months

## 2022-11-29 DIAGNOSIS — M79.7 FIBROMYALGIA: ICD-10-CM

## 2022-11-29 RX ORDER — GABAPENTIN 300 MG/1
600 CAPSULE ORAL 3 TIMES DAILY
Qty: 180 CAPSULE | Refills: 0 | Status: SHIPPED | OUTPATIENT
Start: 2022-11-29 | End: 2022-12-29

## 2022-11-29 RX ORDER — GABAPENTIN 300 MG/1
600 CAPSULE ORAL 3 TIMES DAILY
Qty: 180 CAPSULE | Refills: 0 | OUTPATIENT
Start: 2022-11-29 | End: 2022-12-29

## 2022-11-29 RX ORDER — TRAMADOL HYDROCHLORIDE 50 MG/1
TABLET ORAL
Qty: 30 TABLET | Refills: 0 | OUTPATIENT
Start: 2022-12-01 | End: 2022-12-31

## 2022-11-29 RX ORDER — GABAPENTIN 300 MG/1
CAPSULE ORAL
Qty: 180 CAPSULE | Refills: 0 | OUTPATIENT
Start: 2022-11-29 | End: 2022-12-29

## 2022-11-29 RX ORDER — TRAMADOL HYDROCHLORIDE 50 MG/1
50 TABLET ORAL NIGHTLY
Qty: 30 TABLET | Refills: 0 | OUTPATIENT
Start: 2022-12-01 | End: 2022-12-31

## 2022-11-29 RX ORDER — TRAMADOL HYDROCHLORIDE 50 MG/1
50 TABLET ORAL NIGHTLY
Qty: 30 TABLET | Refills: 0 | Status: SHIPPED | OUTPATIENT
Start: 2022-12-01 | End: 2022-12-31

## 2022-12-22 DIAGNOSIS — M79.7 FIBROMYALGIA: ICD-10-CM

## 2022-12-23 ENCOUNTER — TELEPHONE (OUTPATIENT)
Dept: INTERNAL MEDICINE CLINIC | Age: 32
End: 2022-12-23

## 2022-12-23 RX ORDER — GABAPENTIN 300 MG/1
CAPSULE ORAL
Qty: 180 CAPSULE | Refills: 0 | OUTPATIENT
Start: 2022-12-23 | End: 2023-01-22

## 2022-12-23 RX ORDER — TRAMADOL HYDROCHLORIDE 50 MG/1
50 TABLET ORAL NIGHTLY
Qty: 30 TABLET | Refills: 0 | Status: SHIPPED | OUTPATIENT
Start: 2022-12-23 | End: 2023-01-22

## 2022-12-23 RX ORDER — TRAMADOL HYDROCHLORIDE 50 MG/1
50 TABLET ORAL NIGHTLY
Qty: 30 TABLET | Refills: 0 | OUTPATIENT
Start: 2022-12-23 | End: 2023-01-22

## 2022-12-23 RX ORDER — GABAPENTIN 300 MG/1
600 CAPSULE ORAL 3 TIMES DAILY
Qty: 180 CAPSULE | Refills: 0 | Status: SHIPPED | OUTPATIENT
Start: 2022-12-23 | End: 2023-01-22

## 2022-12-23 NOTE — TELEPHONE ENCOUNTER
----- Message from Melanie Marie MD sent at 12/23/2022  1:57 PM EST -----  Ok to do    ----- Message -----  From: Selvin Vasquez  Sent: 12/23/2022  11:55 AM EST  To: Melanie Marie MD    Pt is leaving for [facility] on Sunday, requesting Tramadol and Gabapentin be filled today. Refills due on 12/29. Okay to fill today?

## 2023-01-17 DIAGNOSIS — M79.7 FIBROMYALGIA: ICD-10-CM

## 2023-01-18 DIAGNOSIS — M79.7 FIBROMYALGIA: ICD-10-CM

## 2023-01-18 RX ORDER — TRAMADOL HYDROCHLORIDE 50 MG/1
TABLET ORAL
Qty: 30 TABLET | Refills: 0 | Status: SHIPPED | OUTPATIENT
Start: 2023-01-22 | End: 2023-02-21

## 2023-01-18 RX ORDER — TRAMADOL HYDROCHLORIDE 50 MG/1
50 TABLET ORAL NIGHTLY
Qty: 30 TABLET | Refills: 0 | OUTPATIENT
Start: 2023-01-18 | End: 2023-02-17

## 2023-01-31 RX ORDER — GABAPENTIN 300 MG/1
CAPSULE ORAL
Qty: 180 CAPSULE | Refills: 0 | Status: SHIPPED | OUTPATIENT
Start: 2023-01-31 | End: 2023-03-02

## 2023-02-01 ENCOUNTER — TELEPHONE (OUTPATIENT)
Dept: CARDIOLOGY CLINIC | Age: 33
End: 2023-02-01

## 2023-02-01 NOTE — TELEPHONE ENCOUNTER
Pt stated that on 01/31 she started to feel pains on the right side of her chest which is not pts typical side that she feels chest pain. Pt stated that on 01/31 her heart rate went to 140 with little to no exertion. On 02/01 pts heart rate was 120. Pt stated that she feels like she ran a mile and sob. PT stated that she feels pain in neck that goes to her head and has right arm pain. Pt did mention that she was suppose to have an ov with npnr on 01/24 but could not come due to starting a new job. Pt would like to know [facility] recommendations. Please advise.

## 2023-02-01 NOTE — TELEPHONE ENCOUNTER
02/01/23-Per (verbal) RN EW, routing to NP's to provide an overbook date & time. Please & thank you.

## 2023-02-01 NOTE — TELEPHONE ENCOUNTER
NPNR has an available slot 02/08/23 at 2:30pm, if appropriate date for pt to be seen please give permission to use this time slot, NPNR already has 12 pts this day. Please advise.

## 2023-02-01 NOTE — TELEPHONE ENCOUNTER
She needs to be seen by NP or myself. I'm extremely booked else I would see her. Please ask her to come in with any data she has to share about rates in the 140s like apple watch or [employer], thanks.

## 2023-02-03 ENCOUNTER — TELEPHONE (OUTPATIENT)
Dept: CARDIOLOGY CLINIC | Age: 33
End: 2023-02-03

## 2023-02-03 NOTE — TELEPHONE ENCOUNTER
Pt calling in to [facility] to discuss a possible work note, prior to her appt for 02/08/23 be made on her behalf. Stating that she may have to have a flexible work from home availability due to her condition. Please advise & thank you.

## 2023-02-03 NOTE — TELEPHONE ENCOUNTER
Attempted to call pt back. Pt answered but call was shortly disconnected. We cannot provide work letter in advance. Can provide letter in office at the time of visit.

## 2023-02-08 ENCOUNTER — OFFICE VISIT (OUTPATIENT)
Dept: CARDIOLOGY CLINIC | Age: 33
End: 2023-02-08
Payer: COMMERCIAL

## 2023-02-08 VITALS
BODY MASS INDEX: 41.22 KG/M2 | DIASTOLIC BLOOD PRESSURE: 84 MMHG | SYSTOLIC BLOOD PRESSURE: 114 MMHG | OXYGEN SATURATION: 98 % | HEART RATE: 84 BPM | HEIGHT: 62 IN | WEIGHT: 224 LBS

## 2023-02-08 DIAGNOSIS — R00.0 INAPPROPRIATE SINUS TACHYCARDIA: ICD-10-CM

## 2023-02-08 DIAGNOSIS — R00.2 PALPITATION: Primary | ICD-10-CM

## 2023-02-08 DIAGNOSIS — R00.0 TACHYCARDIA: ICD-10-CM

## 2023-02-08 PROCEDURE — 99214 OFFICE O/P EST MOD 30 MIN: CPT

## 2023-02-08 PROCEDURE — 93000 ELECTROCARDIOGRAM COMPLETE: CPT

## 2023-02-08 RX ORDER — MAGNESIUM OXIDE 400 MG/1
400 TABLET ORAL DAILY
Qty: 90 TABLET | Refills: 3 | Status: SHIPPED | OUTPATIENT
Start: 2023-02-08

## 2023-02-08 RX ORDER — ESTRADIOL 1 MG/1
1 TABLET ORAL DAILY
COMMUNITY

## 2023-02-08 RX ORDER — MAGNESIUM OXIDE 400 MG/1
400 TABLET ORAL DAILY
Qty: 90 TABLET | Refills: 1
Start: 2023-02-08 | End: 2023-02-08 | Stop reason: SDUPTHER

## 2023-02-08 NOTE — PATIENT INSTRUCTIONS
Restart magnesium oxide 400 mg once a day    No other changes today    Continue to monitor your heart rates at home. Use EKG on apple watch to document rhythm of episodes     Monitor your blood pressure at home a couple times a week.  Keep a log and bring to next visit     Follow up in 3 months

## 2023-02-08 NOTE — PROGRESS NOTES
[address]   Electrophysiology Outpatient Note              Date:  February 8, 2023  Patient name: Dede Fisher  YOB: 1990    Primary Care physician: Romana Just, MD    HISTORY OF PRESENT ILLNESS: The patient is a 34 y.o.  female with a history of syncope, tachycardia, migraines, fibromyalgia, adenomyosis s/p hysterectomy and salpingo-oophorectomy. In 3/2021 she went to the ED for concerns of fatigue. She reported syncopal episodes at home. She was unable to tolerate metoprolol due to fatigue and bradycardia. In 3/2021 cardiac monitor showed predominately SR with tachycardia, rare PAC's/PVC's. In 2021 she was referred to neurology. In 5/2021 stress test showed ischemia. She underwent LHC that did not show any signs of CAD. In 5/2021 she underwent a tilt table test (normal physiologic response) and ILR removal. ILR removal was complicated by migration of device and required sedation. She developed post procedure cellulitis. In 11/2021 cardiac monitor showed predominately SR with 0.1% PVC/PAC. In 5/2022 she was started on flecainide, Cardizem and magnesium. Today she is being seen for inappropriate sinus tachycardia. EKG shows SR with a HR of 80. She presents to the office today with her spouse. After her appointment in May she felt her medicine was working well. The past few weeks have been rough. She started a new job and has noticed her heart rates increasing. At the end of January from the 21st-27th her HR was up to the 140's-160's. She was standing in the elevator and her HR was 120's. The past week her heart rate has been anywhere from  bpm. This week it has ranged from  bpm. She does not use the EKG feature to assess the heart rhythm. She has heart spasms that are now spreading to her right side. She has these in the middle of the night. When she has these spasms, she has trouble moving her left arm. The pain travels down the back of her arm. She denies recent stressors. She has hot flashes since her salpingo-oophorectomy. She is taking her flecainide and Cardizem as prescribed. She has not been taking her magnesium. She has gained 15 lbs despite trying to lose weight. She gets 27,000 steps most days. She drinks 1 cup of coffee in the morning and over 80 oz of water a day. It is not uncommon for her to feel short of breath. When she does she will check her heart rate and it will be normal. She has dizziness with position changes. 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 She currently works from home twice a week, and is interested in getting a work letter that will allow her to work from home more often if needed. Past Medical History:   has a past medical history of Anemia, Anxiety, Heart abnormalities, Miscarriage, Ovarian cyst, and Tricuspid regurgitation. Past Surgical History:   has a past surgical history that includes Appendectomy (2006); Ovarian cyst surgery; Endometrial ablation (02/01/2017); Insertable Cardiac Monitor (05/24/2017); Hysterectomy; Cardiac catheterization (05/07/2021); and Hysterectomy, total abdominal.     Home Medications:    Prior to Admission medications    Medication Sig Start Date End Date Taking?  Authorizing Provider   estradiol (ESTRACE) 1 MG tablet Take 1 mg by mouth daily   Yes Historical Provider, MD   gabapentin (NEURONTIN) 300 MG capsule TAKE TWO CAPSULES BY MOUTH THREE TIMES A DAY 1/31/23 3/2/23 Yes Angela Santana MD   traMADol (ULTRAM) 50 MG tablet TAKE ONE TABLET BY MOUTH AT BEDTIME 1/22/23 2/21/23 Yes Angela Santana MD   dilTIAZem (CARDIZEM CD) 120 MG extended release capsule Take 1 capsule by mouth daily 11/28/22  Yes NIKKO Calvin CNP   flecainide (TAMBOCOR) 50 MG tablet Take 1 tablet by mouth 2 times daily 11/28/22  Yes NIKKO Su CNP   dilTIAZem (CARDIZEM CD) 120 MG extended release capsule Take 1 capsule by mouth daily 11/28/22  Yes Alia Mays APRN - CNP   furosemide (LASIX) 20 MG tablet Take 1 tablet by mouth daily as needed (edema) 6/28/22  Yes Summer Cintron MD   estrogens, conjugated, (PREMARIN) 1.25 MG tablet Take 1 tablet by mouth daily  Patient not taking: Reported on 2/8/2023 1/18/23   Summer Cintron MD   magnesium oxide (MAG-OX) 400 MG tablet Take 1 tablet by mouth daily  Patient not taking: Reported on 2/8/2023 10/31/22   Doug Francisco MD   nitroGLYCERIN (NITROSTAT) 0.3 MG SL tablet Place 1 tablet under the tongue every 5 minutes as needed for Chest pain up to max of 3 total doses. If no relief after 1 dose, call 911. 5/14/21   Doug Francisco MD     Allergies:  Demerol, Adhesive tape, and Procardia [nifedipine]    Social History:   reports that she has never smoked. She has never used smokeless tobacco. She reports that she does not drink alcohol and does not use drugs. Family History: family history includes Arthritis in her mother; Breast Cancer in her maternal grandmother; Cancer in her maternal grandmother and paternal grandmother; Mental Retardation in her maternal uncle. All 14 point review of systems are completed and pertinent positives are mentioned in the history of present illness. Other systems are reviewed and are negative.      PHYSICAL EXAM:    Vital signs:    /84   Pulse 84   Ht 5' 2\" (1.575 m)   Wt 224 lb (101.6 kg)   LMP 08/07/2017 Comment: hyst 08/2017  SpO2 98%   BMI 40.97 kg/m²      Constitutional and general appearance: alert, cooperative, no distress, and appears stated age  [de-identified]: Normal oral mucosa  Respiratory:  Normal excursion and expansion without use of accessory muscles  Resp auscultation: Normal breath sounds without wheezing, rhonchi, and rales  Cardiovascular:  Heart tones are crisp and normal. regular S1 and S2.  Jugular venous pulsation Normal  Peripheral pulses are symmetrical and full   Abdomen:  No masses or tenderness  Bowel sounds present  Extremities:   No cyanosis or clubbing   No lower extremity edema   Skin: warm and dry  Neurological:  Alert and oriented  Moves all extremities well  No abnormalities of mood, affect, memory, mentation, or behavior are noted    DATA:    ECG 2/08/2023:  SR, HR 80    Stress test 5/5/2021  Summary There is a large area, moderate to severe, reversible perfusion defect  involving the anterior/anterolateral wall. FIndings are consistent with  inducible ischemia of the LAD territory. Hyperdynamic LV function with LVEF  > 70%. Overall findings represent a high risk scan. [facility] 5/7/2021  Impression:   1. Normal coronary arteries. 2. LVEDP 15 mmHg. Plan:   1. Optimal medical therapy for CAD risk factors. 2. Consider PFTs to evaluate for both obstructive and restrictive   lung disease. 3. It was recommend that she stop her keto diet and focus on   weight through a well-balanced heart healthy diet and regular   physical exercise for at least 30 minutes 3-5 times per week. 4. Follow-up with [address] in one month. Echo 4/9/2021  Summary   Limited study. Left ventricular cavity size is normal. Normal left   ventricular wall thickness. Overall left ventricular systolic function   appears normal with an ejection fraction of 55%. No regional wall motion   abnormalities are noted. Estimated pulmonary artery systolic pressure is at   31 mmHg assuming a right atrial pressure of 3 mmHg. All labs and testing reviewed. CARDIOLOGY LABS:   CBC: No results for input(s): WBC, HGB, HCT, PLT in the last 72 hours. BMP: No results for input(s): NA, K, CO2, BUN, CREATININE, LABGLOM, GLUCOSE in the last 72 hours. PT/INR: No results for input(s): PROTIME, INR in the last 72 hours. APTT:No results for input(s): APTT in the last 72 hours.   FASTING LIPID PANEL:  Lab Results   Component Value Date/Time    HDL 62 05/07/2021 07:45 AM    LDLCALC 121 05/07/2021 07:45 AM    TRIG 78 05/07/2021 07:45 AM LIVER PROFILE:No results for input(s): AST, ALT, ALB in the last 72 hours. IMPRESSION:    Patient Active Problem List   Diagnosis     in first trimester    Palpitation    Tachycardia    Syncope    Patellofemoral stress syndrome of both knees    POTS (postural orthostatic tachycardia syndrome)    H/O: hysterectomy    Fibromyalgia    Anxiety    Blood pressure elevated without history of HTN    Pedal edema    Chest pain    Abnormal stress test     Assessment:   Inappropriate sinus tachycardia/palpitations: ongoing    -normal tilt table 2021   -started on flecainide and Cardizem 2022  PAC/PVC's:   -0.1% on cardiac monitor 2021  Fibromyalgia   Anxiety   PTSD  Migraines   Hx of abnormal stress test- followed by normal [facility] 2021      Plan:   1. Restart magnesium oxide 400 mg once a day for heart spasms  2. We discussed increasing Cardizem to 180 mg, but hesitant given borderline heart rates in the 50's seen on apple watch. Will continue Cardizem 120 mg daily  3. Continue flecainide    4. Continue to monitor your heart rates at home. Use EKG on apple watch to document rhythm of episodes   5. Monitor your blood pressure at home a couple times a week. Keep a log and bring to next visit   6. Will discuss medical necessity to work from home with Dr. Orion Amezquita   7.  Follow up in 3 months       [facility] NIKKO Merchant-SANDRO [facility]  [phone]

## 2023-02-15 DIAGNOSIS — M79.7 FIBROMYALGIA: ICD-10-CM

## 2023-02-15 RX ORDER — TRAMADOL HYDROCHLORIDE 50 MG/1
50 TABLET ORAL NIGHTLY
Qty: 30 TABLET | Refills: 0 | Status: SHIPPED | OUTPATIENT
Start: 2023-02-19 | End: 2023-03-21

## 2023-02-15 RX ORDER — TRAMADOL HYDROCHLORIDE 50 MG/1
TABLET ORAL
Qty: 30 TABLET | Refills: 0 | OUTPATIENT
Start: 2023-02-19 | End: 2023-03-21

## 2023-02-23 ENCOUNTER — TELEPHONE (OUTPATIENT)
Dept: CARDIOLOGY CLINIC | Age: 33
End: 2023-02-23

## 2023-02-23 RX ORDER — GABAPENTIN 300 MG/1
300 CAPSULE ORAL 3 TIMES DAILY
Qty: 180 CAPSULE | Refills: 0 | Status: SHIPPED | OUTPATIENT
Start: 2023-03-02 | End: 2023-04-01

## 2023-02-23 NOTE — LETTER
[address] [facility], [address] [facility] [address] [address]  Phone: [phone]  Fax: [phone]    Dr Claudell Sine      March 1, 2023    Risa Quiles  1990  Per Dr Claudell Sine,   It is medically necessary for this patient to work from home when she is symptomatic from her inappropriate sinus tachycardia.      Sincerely,    [facility]  Dr Fred March

## 2023-02-23 NOTE — TELEPHONE ENCOUNTER
I reviewed her case with Dr. Anival Mccoy. He is very familiar with her and has followed with her for years. He is comfortable writing a letter for her current employer that states it is medically necessary that she work from home on days where her inappropriate sinus tachycardia is worse and causing her to feel poorly (short of breath, dizzy). Thank you!       NIKKO Yun-CNP  [facility]   [phone]

## 2023-02-24 ENCOUNTER — TELEPHONE (OUTPATIENT)
Dept: INTERNAL MEDICINE CLINIC | Age: 33
End: 2023-02-24

## 2023-02-24 RX ORDER — GABAPENTIN 300 MG/1
CAPSULE ORAL
Qty: 180 CAPSULE | Refills: 0 | OUTPATIENT
Start: 2023-03-02 | End: 2023-04-01

## 2023-02-24 NOTE — TELEPHONE ENCOUNTER
----- Message from Mark Jon MD sent at 2/24/2023  2:04 PM EST -----  2 tid    ----- Message -----  From: Ambreen Gibson  Sent: 2/24/2023   9:05 AM EST  To: Mark Jon MD    Pharmacy needing clarification as Gabapentin has 2 sets of directions: Take 1 capsule by mouth 3 times daily for 30 days. TAKE TWO CAPSULES BY MOUTH THREE TIMES A DAY    Please advise. Carole Banks.  Riut Calero

## 2023-03-15 DIAGNOSIS — M79.7 FIBROMYALGIA: ICD-10-CM

## 2023-03-15 RX ORDER — TRAMADOL HYDROCHLORIDE 50 MG/1
TABLET ORAL
Qty: 30 TABLET | Refills: 0 | Status: SHIPPED | OUTPATIENT
Start: 2023-03-17 | End: 2023-04-16

## 2023-03-15 RX ORDER — TRAMADOL HYDROCHLORIDE 50 MG/1
50 TABLET ORAL NIGHTLY
Qty: 30 TABLET | Refills: 0 | OUTPATIENT
Start: 2023-03-17 | End: 2023-04-16

## 2023-03-28 ENCOUNTER — TELEPHONE (OUTPATIENT)
Dept: INTERNAL MEDICINE CLINIC | Age: 33
End: 2023-03-28

## 2023-03-28 RX ORDER — GABAPENTIN 300 MG/1
CAPSULE ORAL
Qty: 180 CAPSULE | Refills: 0 | OUTPATIENT
Start: 2023-03-30 | End: 2023-04-29

## 2023-03-28 RX ORDER — GABAPENTIN 300 MG/1
300 CAPSULE ORAL 3 TIMES DAILY
Qty: 90 CAPSULE | Refills: 0 | Status: SHIPPED | OUTPATIENT
Start: 2023-03-30 | End: 2023-04-29

## 2023-03-28 RX ORDER — GABAPENTIN 300 MG/1
300 CAPSULE ORAL 3 TIMES DAILY
Qty: 180 CAPSULE | Refills: 0 | Status: SHIPPED
Start: 2023-03-30 | End: 2023-03-28 | Stop reason: CLARIF

## 2023-03-28 RX ORDER — GABAPENTIN 300 MG/1
300 CAPSULE ORAL 3 TIMES DAILY
Qty: 180 CAPSULE | Refills: 0 | OUTPATIENT
Start: 2023-03-30 | End: 2023-04-29

## 2023-03-28 NOTE — TELEPHONE ENCOUNTER
----- Message from NIKKO Dewitt CNP sent at 3/28/2023 12:49 PM EDT -----  Contact: Sarwat Toledo [phone]  It looks like Dr. Kiki Gomez wants 1 capsule 3 times daily for 30 days    ----- Message -----  From: Jaden Jimenez  Sent: 3/28/2023  11:53 AM EDT  To: NIKKO Dewitt CNP    Pharmacy needs clarification on directions for below medication. Please advise     gabapentin (NEURONTIN) 300 MG capsule     Take 1 capsule by mouth 3 times daily for 30 days.  TAKE TWO CAPSULES BY MOUTH THREE TIMES A DAY      [facility] [id number] - [place], [place] - [address] [phone] [facility] [address] [phone]   [address] [address] [place] [address] [address], [address]   Phone:  [phone]  Fax:  [phone]

## 2023-04-06 ENCOUNTER — OFFICE VISIT (OUTPATIENT)
Dept: INTERNAL MEDICINE CLINIC | Age: 33
End: 2023-04-06

## 2023-04-06 VITALS
RESPIRATION RATE: 18 BRPM | HEIGHT: 62 IN | BODY MASS INDEX: 41.22 KG/M2 | SYSTOLIC BLOOD PRESSURE: 125 MMHG | HEART RATE: 65 BPM | WEIGHT: 224 LBS | DIASTOLIC BLOOD PRESSURE: 75 MMHG

## 2023-04-06 DIAGNOSIS — E66.01 CLASS 3 SEVERE OBESITY WITH SERIOUS COMORBIDITY AND BODY MASS INDEX (BMI) OF 45.0 TO 49.9 IN ADULT, UNSPECIFIED OBESITY TYPE (HCC): ICD-10-CM

## 2023-04-06 DIAGNOSIS — M79.7 FIBROMYALGIA: ICD-10-CM

## 2023-04-06 DIAGNOSIS — F41.9 ANXIETY: ICD-10-CM

## 2023-04-06 DIAGNOSIS — G90.A POTS (POSTURAL ORTHOSTATIC TACHYCARDIA SYNDROME): Primary | ICD-10-CM

## 2023-04-06 DIAGNOSIS — R94.39 ABNORMAL STRESS TEST: ICD-10-CM

## 2023-04-06 LAB
CHOLEST SERPL-MCNC: 239 MG/DL (ref 0–199)
HDLC SERPL-MCNC: 54 MG/DL (ref 40–60)
LDL CHOLESTEROL CALCULATED: 133 MG/DL
MAGNESIUM SERPL-MCNC: 1.7 MG/DL (ref 1.8–2.4)
T4 FREE SERPL-MCNC: 1.1 NG/DL (ref 0.9–1.8)
TRIGL SERPL-MCNC: 261 MG/DL (ref 0–150)
TSH SERPL DL<=0.005 MIU/L-ACNC: 4.64 UIU/ML (ref 0.27–4.2)
VLDLC SERPL CALC-MCNC: 52 MG/DL

## 2023-04-06 PROCEDURE — 99213 OFFICE O/P EST LOW 20 MIN: CPT | Performed by: INTERNAL MEDICINE

## 2023-04-06 RX ORDER — BACLOFEN 10 MG/1
10 TABLET ORAL NIGHTLY
Qty: 30 TABLET | Refills: 2 | Status: SHIPPED | OUTPATIENT
Start: 2023-04-06

## 2023-04-06 ASSESSMENT — PATIENT HEALTH QUESTIONNAIRE - PHQ9
SUM OF ALL RESPONSES TO PHQ QUESTIONS 1-9: 0
2. FEELING DOWN, DEPRESSED OR HOPELESS: 0
SUM OF ALL RESPONSES TO PHQ QUESTIONS 1-9: 0
1. LITTLE INTEREST OR PLEASURE IN DOING THINGS: 0
SUM OF ALL RESPONSES TO PHQ9 QUESTIONS 1 & 2: 0

## 2023-04-06 ASSESSMENT — ENCOUNTER SYMPTOMS: BACK PAIN: 1

## 2023-04-07 DIAGNOSIS — M79.7 FIBROMYALGIA: ICD-10-CM

## 2023-04-07 LAB
EST. AVERAGE GLUCOSE BLD GHB EST-MCNC: 102.5 MG/DL
HBA1C MFR BLD: 5.2 %

## 2023-04-07 RX ORDER — TRAMADOL HYDROCHLORIDE 50 MG/1
50 TABLET ORAL NIGHTLY
Qty: 30 TABLET | Refills: 0 | Status: SHIPPED | OUTPATIENT
Start: 2023-04-15 | End: 2023-05-15

## 2023-04-24 ENCOUNTER — TELEPHONE (OUTPATIENT)
Dept: INTERNAL MEDICINE CLINIC | Age: 33
End: 2023-04-24

## 2023-04-24 RX ORDER — SEMAGLUTIDE 0.25 MG/.5ML
0.25 INJECTION, SOLUTION SUBCUTANEOUS
Qty: 2 ML | Refills: 0 | Status: SHIPPED | OUTPATIENT
Start: 2023-04-24

## 2023-04-24 NOTE — TELEPHONE ENCOUNTER
----- Message from Roberto Cohen sent at 4/24/2023  4:02 PM EDT -----  Contact: [phone]  Patient said her weight loss medication did not go through on [facility] end. Could you resend it. Please advise.       Semaglutide-Weight Management (WEGOVY) 0.25 MG/0.5ML [facility]       Pharmacy    [facility] [id number] - [address], [address]   Phone:  [phone]  Fax:  [phone]

## 2023-04-27 DIAGNOSIS — M79.7 FIBROMYALGIA: ICD-10-CM

## 2023-04-29 RX ORDER — TRAMADOL HYDROCHLORIDE 50 MG/1
50 TABLET ORAL NIGHTLY
Qty: 30 TABLET | Refills: 0 | Status: SHIPPED | OUTPATIENT
Start: 2023-05-07 | End: 2023-06-06

## 2023-04-29 RX ORDER — GABAPENTIN 300 MG/1
300 CAPSULE ORAL 3 TIMES DAILY
Qty: 90 CAPSULE | Refills: 0 | Status: SHIPPED | OUTPATIENT
Start: 2023-04-29 | End: 2023-05-29

## 2023-05-11 RX ORDER — SEMAGLUTIDE 1 MG/.5ML
1 INJECTION, SOLUTION SUBCUTANEOUS
Qty: 2 ML | Refills: 0 | Status: SHIPPED | OUTPATIENT
Start: 2023-05-11

## 2023-05-15 RX ORDER — SEMAGLUTIDE 0.5 MG/.5ML
INJECTION, SOLUTION SUBCUTANEOUS
Qty: 2 ML | Refills: 0 | Status: SHIPPED | OUTPATIENT
Start: 2023-05-15

## 2023-05-15 NOTE — TELEPHONE ENCOUNTER
----- Message from Mark Jon MD sent at 5/14/2023  9:33 PM EDT -----  Change ot 0.5 mg weekly x 4 weeks, then 1 mg weekly    ----- Message -----  From: Mary Kim  Sent: 5/12/2023   9:05 AM EDT  To: Mark Jon MD    Pharmacy wanting to verify that you do not want patient to take 0.5 mg of Wegovy, that you want her to go to 1 mg from 0.25?     Thomas Parrish

## 2023-05-21 DIAGNOSIS — R00.2 PALPITATION: ICD-10-CM

## 2023-05-21 DIAGNOSIS — R00.0 TACHYCARDIA: ICD-10-CM

## 2023-05-21 DIAGNOSIS — G90.A POTS (POSTURAL ORTHOSTATIC TACHYCARDIA SYNDROME): ICD-10-CM

## 2023-05-22 RX ORDER — DILTIAZEM HYDROCHLORIDE 120 MG/1
120 CAPSULE, COATED, EXTENDED RELEASE ORAL DAILY
Qty: 90 CAPSULE | Refills: 1 | Status: SHIPPED | OUTPATIENT
Start: 2023-05-22

## 2023-05-22 RX ORDER — LEVOTHYROXINE SODIUM 0.03 MG/1
25 TABLET ORAL DAILY
Qty: 90 TABLET | Refills: 0 | Status: SHIPPED | OUTPATIENT
Start: 2023-05-22

## 2023-05-29 DIAGNOSIS — M79.7 FIBROMYALGIA: ICD-10-CM

## 2023-05-30 RX ORDER — TRAMADOL HYDROCHLORIDE 50 MG/1
50 TABLET ORAL NIGHTLY
Qty: 30 TABLET | Refills: 0 | Status: SHIPPED | OUTPATIENT
Start: 2023-05-30 | End: 2023-06-29

## 2023-05-30 RX ORDER — GABAPENTIN 300 MG/1
300 CAPSULE ORAL 3 TIMES DAILY
Qty: 90 CAPSULE | Refills: 0 | Status: SHIPPED | OUTPATIENT
Start: 2023-05-30 | End: 2023-06-29

## 2023-06-06 ENCOUNTER — TELEPHONE (OUTPATIENT)
Dept: INTERNAL MEDICINE CLINIC | Age: 33
End: 2023-06-06

## 2023-06-06 NOTE — TELEPHONE ENCOUNTER
----- Message from Froy Mcrae MD sent at 6/6/2023  2:10 PM EDT -----  Contact: [phone]  Refill    ----- Message -----  From: Guvera Query  Sent: 6/6/2023  10:20 AM EDT  To: Froy Mcrae MD    Pt stated that years ago she took a lower dose but cannot remember what it was. She has been taking 1.25 mg recently.   ----- Message -----  From: Froy Mcrae MD  Sent: 6/6/2023   9:42 AM EDT  To: Jackie Felt    How about the other dose  Is she taking    ----- Message -----  From: Guvera Query  Sent: 6/5/2023   2:15 PM EDT  To: Froy Mcrae MD    This was sent in march for 1.25mg. . please advise.   ----- Message -----  From: Froy Mcrae MD  Sent: 6/5/2023   1:59 PM EDT  To: Jackie Felt    She is taking a different dose    ----- Message -----  From: Radha Dominguez  Sent: 6/5/2023   1:25 PM EDT  To: Froy Mcrae MD    Patient asking why below script was denied?  Please advise     estrogens, conjugated, (PREMARIN) 1.25 MG table        [id number] [phone]

## 2023-06-19 RX ORDER — SEMAGLUTIDE 1.7 MG/.75ML
1.7 INJECTION, SOLUTION SUBCUTANEOUS
Qty: 3 ML | Refills: 0 | Status: SHIPPED | OUTPATIENT
Start: 2023-06-19

## 2023-06-19 NOTE — TELEPHONE ENCOUNTER
----- Message from Melvin Singh MD sent at 6/19/2023  3:48 PM EDT -----  Contact: [phone]  Yes but can cause more nausea    ----- Message -----  From: Eddy Franklin  Sent: 6/19/2023  10:55 AM EDT  To: Melvin Singh MD    Patient states she cannot find the 1 mg anywhere, and also cannot find 0.5 mg. Is it ok for patient to take 1.75 mg? Please advise.  ----- Message -----  From: Melvin Singh MD  Sent: 6/19/2023  10:44 AM EDT  To: Eddy Franklin    I believe she is supposed to take 1 mg weekly ?    ----- Message -----  From: Patricia Saravia  Sent: 6/19/2023   9:49 AM EDT  To: Melvin Singh MD    Patient states she has called multiple pharmacy's to find below medication dosage. She has not found it any where. She has found 1.75 dosage and called her insurance to see if she would get approved. Insurance done a trial pre authorization and it went through. Patient requesting below medication sent to below pharmacy with the dosage changed to 1.75. Please advise         Semaglutide-Weight Management (WEGOVY) 1 MG/0.5ML SOAJ SC injection [[id number]      [facility].  [address] [facility]

## 2023-07-05 DIAGNOSIS — M79.7 FIBROMYALGIA: ICD-10-CM

## 2023-07-05 RX ORDER — FUROSEMIDE 20 MG/1
20 TABLET ORAL DAILY PRN
Qty: 90 TABLET | Refills: 1 | Status: SHIPPED | OUTPATIENT
Start: 2023-07-05

## 2023-07-05 RX ORDER — LEVOTHYROXINE SODIUM 0.03 MG/1
TABLET ORAL
Qty: 90 TABLET | Refills: 0 | Status: SHIPPED | OUTPATIENT
Start: 2023-07-05

## 2023-07-06 RX ORDER — TRAMADOL HYDROCHLORIDE 50 MG/1
TABLET ORAL
Qty: 30 TABLET | Refills: 0 | Status: SHIPPED | OUTPATIENT
Start: 2023-07-06 | End: 2023-08-05

## 2023-07-06 RX ORDER — GABAPENTIN 300 MG/1
CAPSULE ORAL
Qty: 90 CAPSULE | Refills: 0 | Status: SHIPPED | OUTPATIENT
Start: 2023-07-06 | End: 2023-08-05

## 2023-07-11 RX ORDER — SEMAGLUTIDE 1.7 MG/.75ML
1.7 INJECTION, SOLUTION SUBCUTANEOUS
Qty: 3 ML | Refills: 2 | Status: SHIPPED | OUTPATIENT
Start: 2023-07-11 | End: 2023-08-13 | Stop reason: SDUPTHER

## 2023-08-01 DIAGNOSIS — M79.7 FIBROMYALGIA: ICD-10-CM

## 2023-08-02 RX ORDER — TRAMADOL HYDROCHLORIDE 50 MG/1
50 TABLET ORAL NIGHTLY PRN
Qty: 30 TABLET | Refills: 0 | Status: SHIPPED | OUTPATIENT
Start: 2023-08-05 | End: 2023-09-04

## 2023-08-10 ENCOUNTER — TELEPHONE (OUTPATIENT)
Dept: INTERNAL MEDICINE CLINIC | Age: 33
End: 2023-08-10

## 2023-08-14 RX ORDER — SEMAGLUTIDE 1.7 MG/.75ML
1.7 INJECTION, SOLUTION SUBCUTANEOUS
Qty: 3 ML | Refills: 1 | Status: SHIPPED
Start: 2023-08-14 | End: 2023-09-14 | Stop reason: DRUGHIGH

## 2023-08-15 ENCOUNTER — TELEPHONE (OUTPATIENT)
Dept: INTERNAL MEDICINE CLINIC | Age: 33
End: 2023-08-15

## 2023-09-03 DIAGNOSIS — M79.7 FIBROMYALGIA: ICD-10-CM

## 2023-09-05 RX ORDER — TRAMADOL HYDROCHLORIDE 50 MG/1
TABLET ORAL
Qty: 30 TABLET | Refills: 0 | Status: SHIPPED | OUTPATIENT
Start: 2023-09-05 | End: 2023-10-05

## 2023-09-13 ENCOUNTER — TELEPHONE (OUTPATIENT)
Dept: CARDIOLOGY CLINIC | Age: 33
End: 2023-09-13

## 2023-09-13 DIAGNOSIS — R00.2 PALPITATION: Primary | ICD-10-CM

## 2023-09-14 ENCOUNTER — TELEPHONE (OUTPATIENT)
Dept: INTERNAL MEDICINE CLINIC | Age: 33
End: 2023-09-14

## 2023-09-14 RX ORDER — SEMAGLUTIDE 2.4 MG/.75ML
2.4 INJECTION, SOLUTION SUBCUTANEOUS
Qty: 3 ML | Refills: 0 | Status: SHIPPED | OUTPATIENT
Start: 2023-09-14

## 2023-09-29 DIAGNOSIS — R00.2 PALPITATION: ICD-10-CM

## 2023-09-29 DIAGNOSIS — M79.7 FIBROMYALGIA: ICD-10-CM

## 2023-09-29 DIAGNOSIS — R00.0 TACHYCARDIA: ICD-10-CM

## 2023-09-29 DIAGNOSIS — G90.A POTS (POSTURAL ORTHOSTATIC TACHYCARDIA SYNDROME): ICD-10-CM

## 2023-09-29 RX ORDER — GABAPENTIN 300 MG/1
300 CAPSULE ORAL 3 TIMES DAILY
Qty: 90 CAPSULE | Refills: 0 | Status: SHIPPED | OUTPATIENT
Start: 2023-09-29 | End: 2023-10-29

## 2023-09-29 RX ORDER — TRAMADOL HYDROCHLORIDE 50 MG/1
50 TABLET ORAL NIGHTLY
Qty: 30 TABLET | Refills: 0 | Status: SHIPPED | OUTPATIENT
Start: 2023-10-05 | End: 2023-11-04

## 2023-09-29 NOTE — TELEPHONE ENCOUNTER
gabapentin (NEURONTIN) 300 MG capsule [Dr. Samir Matos MD]         traMADol Bhavya Piggs) 50 MG tablet [Dr. Samir Matos MD]     Preferred pharmacy: [address] [phone] - F [phone]       Medication renewals requested in this message routed separately:         magnesium oxide (MAG-OX) 400 MG tablet [Rody Shelton, APRN - CNP]           dilTIAZem (CARDIZEM CD) 120 MG extended release capsule [Dr. Glory Calzada MD

## 2023-10-02 ENCOUNTER — TELEPHONE (OUTPATIENT)
Dept: CARDIOLOGY CLINIC | Age: 33
End: 2023-10-02

## 2023-10-02 DIAGNOSIS — R55 SYNCOPE, UNSPECIFIED SYNCOPE TYPE: Primary | ICD-10-CM

## 2023-10-02 DIAGNOSIS — M79.7 FIBROMYALGIA: ICD-10-CM

## 2023-10-02 RX ORDER — MAGNESIUM OXIDE 400 MG/1
400 TABLET ORAL DAILY
Qty: 90 TABLET | Refills: 3 | Status: SHIPPED | OUTPATIENT
Start: 2023-10-02

## 2023-10-02 RX ORDER — TRAMADOL HYDROCHLORIDE 50 MG/1
TABLET ORAL
Qty: 30 TABLET | Refills: 0 | OUTPATIENT
Start: 2023-10-02

## 2023-10-02 RX ORDER — DILTIAZEM HYDROCHLORIDE 120 MG/1
120 CAPSULE, COATED, EXTENDED RELEASE ORAL DAILY
Qty: 90 CAPSULE | Refills: 1 | Status: SHIPPED | OUTPATIENT
Start: 2023-10-02

## 2023-10-02 NOTE — TELEPHONE ENCOUNTER
I spoke with the patient and provided her with central scheduling number ([phone]). Echocardiogram ordered.

## 2023-10-02 NOTE — TELEPHONE ENCOUNTER
Pt wants to know if [facility] will order her an ECHO. She said has been awhile since she had one. Pt is moving to [facility] 11/1/2023. Please advise.

## 2023-10-03 DIAGNOSIS — M79.7 FIBROMYALGIA: ICD-10-CM

## 2023-10-03 RX ORDER — TRAMADOL HYDROCHLORIDE 50 MG/1
TABLET ORAL
Qty: 30 TABLET | Refills: 0 | Status: SHIPPED | OUTPATIENT
Start: 2023-10-03 | End: 2023-11-02

## 2023-10-05 ENCOUNTER — HOSPITAL ENCOUNTER (OUTPATIENT)
Dept: NON INVASIVE DIAGNOSTICS | Age: 33
Discharge: HOME | End: 2023-10-05
Payer: COMMERCIAL

## 2023-10-05 DIAGNOSIS — R55 SYNCOPE, UNSPECIFIED SYNCOPE TYPE: ICD-10-CM

## 2023-10-05 PROCEDURE — 93306 TTE W/DOPPLER COMPLETE: CPT

## 2023-10-09 ENCOUNTER — TELEPHONE (OUTPATIENT)
Dept: CARDIOLOGY CLINIC | Age: 33
End: 2023-10-09

## 2023-10-09 DIAGNOSIS — M79.7 FIBROMYALGIA: ICD-10-CM

## 2023-10-09 RX ORDER — TRAMADOL HYDROCHLORIDE 50 MG/1
TABLET ORAL
Qty: 30 TABLET | Refills: 0 | OUTPATIENT
Start: 2023-10-09 | End: 2023-11-08

## 2023-10-09 NOTE — TELEPHONE ENCOUNTER
----- Message from Samson Prieto MD sent at 10/9/2023  4:30 PM EDT -----  Reviewed. Please let patient know cardiac function within normal limits. Unchanged.

## 2023-10-12 ENCOUNTER — OFFICE VISIT (OUTPATIENT)
Dept: INTERNAL MEDICINE CLINIC | Age: 33
End: 2023-10-12

## 2023-10-12 VITALS
BODY MASS INDEX: 32.39 KG/M2 | SYSTOLIC BLOOD PRESSURE: 115 MMHG | HEIGHT: 62 IN | DIASTOLIC BLOOD PRESSURE: 75 MMHG | WEIGHT: 176 LBS | HEART RATE: 65 BPM | RESPIRATION RATE: 18 BRPM

## 2023-10-12 DIAGNOSIS — Z00.00 ANNUAL PHYSICAL EXAM: ICD-10-CM

## 2023-10-12 DIAGNOSIS — Z00.00 ENCOUNTER FOR WELL ADULT EXAM WITHOUT ABNORMAL FINDINGS: ICD-10-CM

## 2023-10-12 DIAGNOSIS — R00.2 PALPITATION: ICD-10-CM

## 2023-10-12 DIAGNOSIS — I10 BENIGN ESSENTIAL HTN: ICD-10-CM

## 2023-10-12 DIAGNOSIS — F51.01 PRIMARY INSOMNIA: ICD-10-CM

## 2023-10-12 DIAGNOSIS — M79.7 FIBROMYALGIA: ICD-10-CM

## 2023-10-12 DIAGNOSIS — N60.02 CYST, BREAST, LEFT: ICD-10-CM

## 2023-10-12 DIAGNOSIS — Z00.00 ANNUAL PHYSICAL EXAM: Primary | ICD-10-CM

## 2023-10-12 DIAGNOSIS — R00.0 TACHYCARDIA: ICD-10-CM

## 2023-10-12 LAB
ALBUMIN SERPL-MCNC: 4.6 G/DL (ref 3.4–5)
ALBUMIN/GLOB SERPL: 1.9 {RATIO} (ref 1.1–2.2)
ALP SERPL-CCNC: 82 U/L (ref 40–129)
ALT SERPL-CCNC: 16 U/L (ref 10–40)
ANION GAP SERPL CALCULATED.3IONS-SCNC: 15 MMOL/L (ref 3–16)
AST SERPL-CCNC: 15 U/L (ref 15–37)
BASOPHILS # BLD: 0.1 K/UL (ref 0–0.2)
BASOPHILS NFR BLD: 0.8 %
BILIRUB SERPL-MCNC: 0.3 MG/DL (ref 0–1)
BUN SERPL-MCNC: 10 MG/DL (ref 7–20)
CALCIUM SERPL-MCNC: 9.6 MG/DL (ref 8.3–10.6)
CHLORIDE SERPL-SCNC: 99 MMOL/L (ref 99–110)
CHOLEST SERPL-MCNC: 225 MG/DL (ref 0–199)
CO2 SERPL-SCNC: 27 MMOL/L (ref 21–32)
CREAT SERPL-MCNC: 0.7 MG/DL (ref 0.6–1.1)
DEPRECATED RDW RBC AUTO: 13.3 % (ref 12.4–15.4)
EOSINOPHIL # BLD: 0 K/UL (ref 0–0.6)
EOSINOPHIL NFR BLD: 0.7 %
GFR SERPLBLD CREATININE-BSD FMLA CKD-EPI: >60 ML/MIN/{1.73_M2}
GLUCOSE SERPL-MCNC: 83 MG/DL (ref 70–99)
HCT VFR BLD AUTO: 40.4 % (ref 36–48)
HCV AB SERPL QL IA: NORMAL
HDLC SERPL-MCNC: 64 MG/DL (ref 40–60)
HGB BLD-MCNC: 13.7 G/DL (ref 12–16)
LDL CHOLESTEROL CALCULATED: 125 MG/DL
LYMPHOCYTES # BLD: 2.1 K/UL (ref 1–5.1)
LYMPHOCYTES NFR BLD: 32.5 %
MCH RBC QN AUTO: 32 PG (ref 26–34)
MCHC RBC AUTO-ENTMCNC: 33.9 G/DL (ref 31–36)
MCV RBC AUTO: 94.4 FL (ref 80–100)
MONOCYTES # BLD: 0.4 K/UL (ref 0–1.3)
MONOCYTES NFR BLD: 6.7 %
NEUTROPHILS # BLD: 3.9 K/UL (ref 1.7–7.7)
NEUTROPHILS NFR BLD: 59.3 %
PLATELET # BLD AUTO: 332 K/UL (ref 135–450)
PMV BLD AUTO: 9.8 FL (ref 5–10.5)
POTASSIUM SERPL-SCNC: 3.4 MMOL/L (ref 3.5–5.1)
PROT SERPL-MCNC: 7 G/DL (ref 6.4–8.2)
RBC # BLD AUTO: 4.27 M/UL (ref 4–5.2)
SODIUM SERPL-SCNC: 141 MMOL/L (ref 136–145)
TRIGL SERPL-MCNC: 182 MG/DL (ref 0–150)
TSH SERPL DL<=0.005 MIU/L-ACNC: 0.67 UIU/ML (ref 0.27–4.2)
URATE SERPL-MCNC: 6.2 MG/DL (ref 2.6–6)
VLDLC SERPL CALC-MCNC: 36 MG/DL
WBC # BLD AUTO: 6.6 K/UL (ref 4–11)

## 2023-10-12 PROCEDURE — 81002 URINALYSIS NONAUTO W/O SCOPE: CPT | Performed by: INTERNAL MEDICINE

## 2023-10-12 PROCEDURE — 3078F DIAST BP <80 MM HG: CPT | Performed by: INTERNAL MEDICINE

## 2023-10-12 PROCEDURE — 99395 PREV VISIT EST AGE 18-39: CPT | Performed by: INTERNAL MEDICINE

## 2023-10-12 PROCEDURE — 3074F SYST BP LT 130 MM HG: CPT | Performed by: INTERNAL MEDICINE

## 2023-10-12 RX ORDER — FUROSEMIDE 20 MG/1
20 TABLET ORAL DAILY PRN
Qty: 90 TABLET | Refills: 1 | Status: SHIPPED | OUTPATIENT
Start: 2023-10-12

## 2023-10-12 RX ORDER — GABAPENTIN 300 MG/1
300 CAPSULE ORAL 3 TIMES DAILY
Qty: 90 CAPSULE | Refills: 0 | Status: SHIPPED | OUTPATIENT
Start: 2023-10-12 | End: 2023-11-11

## 2023-10-12 RX ORDER — LEVOTHYROXINE SODIUM 0.03 MG/1
25 TABLET ORAL DAILY
Qty: 90 TABLET | Refills: 1 | Status: SHIPPED | OUTPATIENT
Start: 2023-10-12

## 2023-10-12 RX ORDER — SEMAGLUTIDE 2.4 MG/.75ML
2.4 INJECTION, SOLUTION SUBCUTANEOUS
Qty: 3 ML | Refills: 2 | Status: SHIPPED | OUTPATIENT
Start: 2023-10-12

## 2023-10-12 ASSESSMENT — PATIENT HEALTH QUESTIONNAIRE - PHQ9
SUM OF ALL RESPONSES TO PHQ QUESTIONS 1-9: 1
1. LITTLE INTEREST OR PLEASURE IN DOING THINGS: 0
SUM OF ALL RESPONSES TO PHQ QUESTIONS 1-9: 1
SUM OF ALL RESPONSES TO PHQ QUESTIONS 1-9: 0
1. LITTLE INTEREST OR PLEASURE IN DOING THINGS: 0
2. FEELING DOWN, DEPRESSED OR HOPELESS: 0
2. FEELING DOWN, DEPRESSED OR HOPELESS: 1
SUM OF ALL RESPONSES TO PHQ QUESTIONS 1-9: 0
SUM OF ALL RESPONSES TO PHQ QUESTIONS 1-9: 1
SUM OF ALL RESPONSES TO PHQ QUESTIONS 1-9: 0
SUM OF ALL RESPONSES TO PHQ9 QUESTIONS 1 & 2: 1
SUM OF ALL RESPONSES TO PHQ9 QUESTIONS 1 & 2: 0
SUM OF ALL RESPONSES TO PHQ QUESTIONS 1-9: 0
SUM OF ALL RESPONSES TO PHQ QUESTIONS 1-9: 1

## 2023-10-12 NOTE — PROGRESS NOTES
helping her symptoms    Left breast cyst - pt reports increase in size of her previous benign cyst. Exam not done  Previous US noted with benign cyst  Repeat ordered       Obesity - weight loss and life style modification along with dietary changes, increased physical activity encouraged    Currently on ozempic and doing well , lost about 48 lbs so far   Continue same        Pedal edema- not noted today. Has lasix to take prn     menpausal symptoms - post RAMSEY, on estrogen supplements      Recommend covid and flu vaccine -refused    Pt reports she is moving to [place] soon with her family   Can follow up with new PCP in next 6 months  Meds refilled  Labs done      An electronic signature was used to authenticate this note.     --Lizbeth Curiel MD

## 2023-10-13 ENCOUNTER — TELEPHONE (OUTPATIENT)
Dept: CARDIOLOGY CLINIC | Age: 33
End: 2023-10-13

## 2023-10-13 NOTE — TELEPHONE ENCOUNTER
Pt would like to know results of heart monitor. Pt states she saw it in [facility] but would like to know what it means. Please advise.

## 2023-10-13 NOTE — TELEPHONE ENCOUNTER
Spoke with patient and relayed monitor results. She would like to see Karian Wheat prior to her move to [place]. Scheduled with Karina Wheat as overbook 10/26.

## 2023-10-24 ENCOUNTER — TELEPHONE (OUTPATIENT)
Dept: INTERNAL MEDICINE CLINIC | Age: 33
End: 2023-10-24

## 2023-10-24 DIAGNOSIS — R00.2 PALPITATION: ICD-10-CM

## 2023-10-24 DIAGNOSIS — R00.0 TACHYCARDIA: ICD-10-CM

## 2023-10-24 DIAGNOSIS — G90.A POTS (POSTURAL ORTHOSTATIC TACHYCARDIA SYNDROME): ICD-10-CM

## 2023-10-24 RX ORDER — FLECAINIDE ACETATE 50 MG/1
50 TABLET ORAL 2 TIMES DAILY
Qty: 180 TABLET | Refills: 0 | Status: SHIPPED | OUTPATIENT
Start: 2023-10-24

## 2023-10-25 ENCOUNTER — TELEPHONE (OUTPATIENT)
Dept: INTERNAL MEDICINE CLINIC | Age: 33
End: 2023-10-25

## 2023-10-25 DIAGNOSIS — M79.7 FIBROMYALGIA: ICD-10-CM

## 2023-10-25 RX ORDER — TRAMADOL HYDROCHLORIDE 50 MG/1
TABLET ORAL
Qty: 30 TABLET | Refills: 0 | OUTPATIENT
Start: 2023-10-25 | End: 2023-11-24

## 2023-10-26 ENCOUNTER — OFFICE VISIT (OUTPATIENT)
Dept: CARDIOLOGY CLINIC | Age: 33
End: 2023-10-26
Payer: COMMERCIAL

## 2023-10-26 VITALS
BODY MASS INDEX: 32.2 KG/M2 | OXYGEN SATURATION: 99 % | SYSTOLIC BLOOD PRESSURE: 120 MMHG | HEIGHT: 62 IN | HEART RATE: 79 BPM | DIASTOLIC BLOOD PRESSURE: 60 MMHG | WEIGHT: 175 LBS

## 2023-10-26 DIAGNOSIS — R00.0 TACHYCARDIA: Primary | ICD-10-CM

## 2023-10-26 DIAGNOSIS — G90.A POTS (POSTURAL ORTHOSTATIC TACHYCARDIA SYNDROME): ICD-10-CM

## 2023-10-26 PROCEDURE — 99214 OFFICE O/P EST MOD 30 MIN: CPT | Performed by: INTERNAL MEDICINE

## 2023-10-26 PROCEDURE — 93000 ELECTROCARDIOGRAM COMPLETE: CPT | Performed by: INTERNAL MEDICINE

## 2023-10-26 NOTE — PATIENT INSTRUCTIONS
RECOMMENDATIONS:   Reach out to Dr Ayse Giraldo once moved. Encourage aerobic exercise. Hold lasix for now. See if this helps with symptoms. Can consider fludrocortisone. Follow up with us as needed.

## 2023-10-30 DIAGNOSIS — M79.7 FIBROMYALGIA: ICD-10-CM

## 2023-10-31 ENCOUNTER — TELEPHONE (OUTPATIENT)
Dept: CARDIOLOGY CLINIC | Age: 33
End: 2023-10-31

## 2023-10-31 DIAGNOSIS — R00.0 TACHYCARDIA: ICD-10-CM

## 2023-10-31 DIAGNOSIS — G90.A POTS (POSTURAL ORTHOSTATIC TACHYCARDIA SYNDROME): Primary | ICD-10-CM

## 2023-10-31 DIAGNOSIS — M79.7 FIBROMYALGIA: ICD-10-CM

## 2023-10-31 NOTE — TELEPHONE ENCOUNTER
Pt stated that she spoke with Dr. Evangelina Mccall office and they need a referral prior to scheduling. There ph is [phone].  Fx is [phone]

## 2023-11-01 RX ORDER — TRAMADOL HYDROCHLORIDE 50 MG/1
50 TABLET ORAL NIGHTLY PRN
Qty: 30 TABLET | Refills: 0 | OUTPATIENT
Start: 2024-11-04 | End: 2024-12-04

## 2023-11-01 RX ORDER — TRAMADOL HYDROCHLORIDE 50 MG/1
50 TABLET ORAL NIGHTLY PRN
Qty: 30 TABLET | Refills: 0 | Status: SHIPPED | OUTPATIENT
Start: 2023-11-03 | End: 2023-12-03

## 2023-11-08 ENCOUNTER — TELEPHONE (OUTPATIENT)
Dept: INTERNAL MEDICINE CLINIC | Age: 33
End: 2023-11-08

## 2023-11-08 RX ORDER — SEMAGLUTIDE 2.4 MG/.75ML
2.4 INJECTION, SOLUTION SUBCUTANEOUS
Qty: 3 ML | Refills: 2 | Status: SHIPPED | OUTPATIENT
Start: 2023-11-08

## 2023-11-08 NOTE — TELEPHONE ENCOUNTER
----- Message from [address] sent at 11/8/2023 12:41 PM EST -----  Contact: Pt [phone]  Pt is requesting a refill of Semaglutide-Weight Management (WEGOVY) 2.4 MG/0.75ML SOAJ SC injection                    [facility] at [facility] at the [address] [place] [address]  Phone: [phone]

## 2023-11-16 ENCOUNTER — TELEPHONE (OUTPATIENT)
Dept: INTERNAL MEDICINE CLINIC | Age: 33
End: 2023-11-16

## 2023-11-16 RX ORDER — SEMAGLUTIDE 2.4 MG/.75ML
2.4 INJECTION, SOLUTION SUBCUTANEOUS
Qty: 3 ML | Refills: 0 | Status: SHIPPED | OUTPATIENT
Start: 2023-11-16

## 2023-11-16 NOTE — TELEPHONE ENCOUNTER
----- Message from Barbi Reeves sent at 2023  3:06 PM EST -----  Contact: patient [phone]  Patient requesting prior authorization be sent for her Semaglutide-Weight Management (WEGOVY) 2.4 MG/0.75ML SOAJ SC injection. Patient states [facility] told her it  10/16/23. Patient would like for you to notify her when this is sent in.   Please advise

## 2023-11-29 ENCOUNTER — TELEPHONE (OUTPATIENT)
Dept: INTERNAL MEDICINE CLINIC | Age: 33
End: 2023-11-29

## 2023-11-29 DIAGNOSIS — M79.7 FIBROMYALGIA: ICD-10-CM

## 2023-11-29 NOTE — TELEPHONE ENCOUNTER
----- Message from Rosalie Adames sent at 11/29/2023  4:13 PM EST -----  Contact: [phone]  Pt needs refill.  Pt will see her new PCP next month         traMADol (ULTRAM) 50 MG tablet        Pharmacy    [id number], [address] [place] [address]  Phone: [phone]  Fax: [phone]

## 2023-11-29 NOTE — TELEPHONE ENCOUNTER
----- Message from Alexandru Main MD sent at 11/27/2023  4:36 PM EST -----  Contact: Yanira Zhang spouse [phone]  Let her know about denial    ----- Message -----  From: Bibi Rehman  Sent: 11/27/2023  12:03 PM EST  To: Alexandru Main MD    PA was denied. Looks like you did an appeal letter in the past, which I added all that information to the most recent PA and they still denied it. Please advise.   ----- Message -----  From: Louise Martin MA  Sent: 11/27/2023  12:00 PM EST  To: Natalia Krause    Spouse called and wanted to check the status of the [facility].

## 2023-11-30 RX ORDER — TRAMADOL HYDROCHLORIDE 50 MG/1
50 TABLET ORAL NIGHTLY PRN
Qty: 30 TABLET | Refills: 0 | OUTPATIENT
Start: 2023-12-01 | End: 2023-12-31

## 2023-11-30 RX ORDER — TRAMADOL HYDROCHLORIDE 50 MG/1
50 TABLET ORAL NIGHTLY PRN
Qty: 30 TABLET | Refills: 0 | OUTPATIENT
Start: 2023-11-30 | End: 2023-12-30

## 2023-12-01 DIAGNOSIS — M79.7 FIBROMYALGIA: ICD-10-CM

## 2023-12-04 RX ORDER — TRAMADOL HYDROCHLORIDE 50 MG/1
50 TABLET ORAL NIGHTLY PRN
Qty: 30 TABLET | Refills: 0 | Status: SHIPPED | OUTPATIENT
Start: 2023-12-04 | End: 2024-01-03

## 2024-08-06 NOTE — PROGRESS NOTES
[address] [facility]   Cardiovascular Evaluation    PATIENT: Roderick Gonzalez  DATE: 2021  MRN: [id number]  CSN: [id number]  : 1990      Primary Care Doctor: Ariana Paul MD  Reason for evaluation:   Follow-up for chest pain    Subjective:   Roderick Gonzalez is a 25-year-old female with a history of recurrent syncope, anxiety, and fibromyalgia who presents for follow-up for chest pain. The patient follows closely with Dr. Cely Espinoza in our EP clinic for recurrent syncopal events which appear to be due to vasovagal reactions and are often exacerbated by anxiety. She previously had an ILR placed which did not reveal any significant arrhythmias. She also recently had a tilt table test and had a normal physiologic response. She had previously reported atypical chest pain to Dr. Cely Espinoza who ordered GXT nuclear SPECT stress test which showed a large area of anterior and anterolateral ischemia. She subsequently underwent invasive coronary angiography which demonstrated normal coronary arteries. During her procedure there was some mild catheter-induced spasm of the ostial and proximal RCA at which time the patient reported chest pressure. She initially thought that this pain was different from the chest pain she had experienced in the past, but then later felt like she was having similar chest pain. She was started on verapamil and feels like her chest pain has improved since. She has had some recent lightheadedness that she says is typically most notable when she is tired and feeling \"wiped out. \"  She has not had any recent syncope.       Patient Active Problem List   Diagnosis     in first trimester    Palpitation    Tachycardia    Syncope and collapse    Patellofemoral stress syndrome of both knees    POTS (postural orthostatic tachycardia syndrome)    H/O: hysterectomy    Fibromyalgia    Anxiety    Blood pressure elevated without history of HTN    Pedal edema  Chest pain    Abnormal stress test         Past Medical History:   has a past medical history of Anemia, Anxiety, Heart abnormalities, Miscarriage, Ovarian cyst, and Tricuspid regurgitation. Surgical History:   has a past surgical history that includes Appendectomy (2006); Ovarian cyst surgery; Endometrial ablation (02/01/2017); Insertable Cardiac Monitor (05/24/2017); Hysterectomy; and Cardiac catheterization (05/07/2021). Social History:   reports that she has never smoked. She has never used smokeless tobacco. She reports that she does not drink alcohol and does not use drugs. Family History:  No evidence for sudden cardiac death or premature CAD    Home Medications:  Reviewed and are listed in nursing record. and/or listed below  Current Outpatient Medications   Medication Sig Dispense Refill    verapamil (CALAN SR) 120 MG extended release tablet Take 1 tablet by mouth daily 90 tablet 1    doxycycline hyclate (VIBRA-TABS) 100 MG tablet Take 1 tablet by mouth 2 times daily for 5 days 10 tablet 0    ondansetron (ZOFRAN ODT) 4 MG disintegrating tablet Take 1 tablet by mouth every 8 hours as needed for Nausea or Vomiting 30 tablet 0    acetaminophen (TYLENOL) 500 MG tablet Take 500 mg by mouth every 6 hours as needed for Pain      nitroGLYCERIN (NITROSTAT) 0.3 MG SL tablet Place 1 tablet under the tongue every 5 minutes as needed for Chest pain up to max of 3 total doses. If no relief after 1 dose, call 911. 30 tablet 3    HYDROcodone-acetaminophen (NORCO) 5-325 MG per tablet Take 1 tablet by mouth every 6 hours as needed for Pain for up to 7 days.  (Patient not taking: Reported on 6/1/2021) 21 tablet 0    ketorolac (TORADOL) 10 MG tablet Take 1 tablet by mouth every 8 hours as needed for Pain (Patient not taking: Reported on 6/1/2021) 15 tablet 0    gabapentin (NEURONTIN) 100 MG capsule TAKE FIVE CAPSULES BY MOUTH DAILY 150 capsule 0     No current facility-administered medications for this visit. Allergies:  Demerol and Procardia [nifedipine]     Review of Systems:   A 14 point review of symptoms completed. Pertinent positives identified in the HPI, all other review of symptoms negative as below. Review of Systems   Constitutional: Negative for chills and fever. HENT: Negative for congestion, rhinorrhea and sore throat. Eyes: Negative for photophobia, pain and visual disturbance. Respiratory: Negative for cough and shortness of breath. Cardiovascular: Positive for chest pain and palpitations. Negative for leg swelling. Gastrointestinal: Negative for abdominal pain, blood in stool, constipation, diarrhea, nausea and vomiting. Endocrine: Negative for cold intolerance. Genitourinary: Negative for difficulty urinating, dysuria and hematuria. Musculoskeletal: Positive for arthralgias and myalgias. Skin: Negative for rash and wound. Allergic/Immunologic: Negative for environmental allergies and food allergies. Neurological: Positive for dizziness and light-headedness. Negative for syncope. Hematological: Negative for adenopathy. Does not bruise/bleed easily. Psychiatric/Behavioral: Negative for dysphoric mood. The patient is nervous/anxious. Objective:   PHYSICAL EXAM:    Vitals:    06/01/21 1420   BP: 112/64   Pulse: 68   SpO2: 96%    Weight: 208 lb 8 oz (94.6 kg)     Wt Readings from Last 3 Encounters:   06/01/21 208 lb 8 oz (94.6 kg)   05/27/21 208 lb (94.3 kg)   05/17/21 208 lb (94.3 kg)     General: Obese adult female in no acute distress. Pleasant and interactive on exam.  HEENT: Normocephalic, atraumatic, non-icteric, hearing intact, nares normal, mucous membranes moist.  Neck: No JVD. Heart: Regular rate and rhythm. Normal S1 and S2. No murmurs, gallops or rubs. Lungs: Normal respiratory effort. Clear to auscultation bilaterally. No wheezes, rales, or rhonchi. Abdomen: Soft, non-tender. Normoactive bowel sounds. No masses or organomegaly.   Skin: No rashes, Gyn Progress Note     SUBJECTIVE:   Pt seen and examined at bedside. No events overnight. Pain well controlled. Patient ambulating. Passing flatus. Tolerating regular diet. Pt denies fever, chills, chest pain, SOB, nausea, vomiting, lightheadedness, dizziness.      PHYSICAL EXAM:   T(F): 98.2 (08-06-24 @ 05:17), Max: 99 (08-05-24 @ 16:00)  HR: 81 (08-06-24 @ 05:17) (74 - 88)  BP: 135/81 (08-06-24 @ 05:17) (101/68 - 140/87)  RR: 18 (08-06-24 @ 05:17) (12 - 20)  SpO2: 100% (08-06-24 @ 05:17) (96% - 100%)  Wt(kg): --    Constitutional: NAD, A+O x3  CV: RRR  Lungs: Clear to auscultation bilaterally  Abdomen: Soft, nondistended, no guarding or rebound tenderness. Normal bowel sounds  Incision: Clean, dry, intact  : No bleeding on pad  Extremities: No lower extremity edema or calf tenderness bilaterally; venodynes in place    LABS:                CAPILLARY BLOOD GLUCOSE          I&O's Summary    05 Aug 2024 07:01  -  06 Aug 2024 07:00  --------------------------------------------------------  IN: 2505 mL / OUT: 2135 mL / NET: 370 mL        MEDICATIONS  (STANDING):  acetaminophen   IVPB .. 1000 milliGRAM(s) IV Intermittent once  heparin   Injectable 5000 Unit(s) SubCutaneous every 8 hours  ketorolac   Injectable 15 milliGRAM(s) IV Push every 6 hours  lactated ringers. 1000 milliLiter(s) (125 mL/Hr) IV Continuous <Continuous>  senna 2 Tablet(s) Oral at bedtime  simethicone 80 milliGRAM(s) Chew every 6 hours    MEDICATIONS  (PRN):  ondansetron Injectable 4 milliGRAM(s) IV Push every 4 hours PRN Nausea and/or Vomiting  oxyCODONE    IR 5 milliGRAM(s) Oral every 6 hours PRN Moderate Pain (4 - 6)  oxyCODONE    IR 10 milliGRAM(s) Oral every 6 hours PRN Severe Pain (7 - 10)       wounds, or lesions. Pulses: 2+ and symmetric. Extremities: No clubbing, cyanosis, or edema. Psych: Normal mood and affect. Neuro: Alert and oriented to person, place, and time. No focal deficits noted. LABS   CBC:      Lab Results   Component Value Date    WBC 5.9 05/17/2021    RBC 4.31 05/17/2021    HGB 13.3 05/17/2021    HCT 39.9 05/17/2021    MCV 92.6 05/17/2021    RDW 13.0 05/17/2021     05/17/2021     CMP:  Lab Results   Component Value Date     05/17/2021    K 4.1 05/17/2021    K 3.8 03/02/2021     05/17/2021    CO2 27 05/17/2021    BUN 21 05/17/2021    CREATININE 0.8 05/17/2021    GFRAA >60 05/17/2021    GFRAA >60 05/16/2012    AGRATIO 1.3 05/17/2021    LABGLOM >60 05/17/2021    GLUCOSE 97 05/17/2021    PROT 7.3 05/17/2021    PROT 7.5 05/16/2012    CALCIUM 9.8 05/17/2021    BILITOT 0.3 05/17/2021    ALKPHOS 82 05/17/2021    AST 31 05/17/2021    ALT 39 05/17/2021     PT/INR:   No results found for: PTINR  Liver:  No components found for: CHLPL  Lab Results   Component Value Date    ALT 39 05/17/2021    AST 31 05/17/2021    ALKPHOS 82 05/17/2021    BILITOT 0.3 05/17/2021     Lab Results   Component Value Date    LABA1C 5.6 05/31/2016     Lipids:         Lab Results   Component Value Date    TRIG 78 05/07/2021            Lab Results   Component Value Date    HDL 62 (H) 05/07/2021            Lab Results   Component Value Date    LDLCALC 121 (H) 05/07/2021            Lab Results   Component Value Date    LABVLDL 16 05/07/2021         CARDIAC DATA     ECHO:  TTE 4/9/21:   Summary   Limited study. Left ventricular cavity size is normal. Normal left   ventricular wall thickness. Overall left ventricular systolic function   appears normal with an ejection fraction of 55%. No regional wall motion   abnormalities are noted. Estimated pulmonary artery systolic pressure is at   31 mmHg assuming a right atrial pressure of 3 mmHg.       STRESS TEST:   GXT Nuclear SPECT Stress 5/5/21:  Stress Gyn Progress Note     SUBJECTIVE:   Pt seen and examined at bedside. No events overnight. Pain well controlled. Patient not yet ambulating. Passing flatus. Tolerating regular diet. Pt denies fever, chills, chest pain, SOB, nausea, vomiting, lightheadedness, dizziness.      PHYSICAL EXAM:   T(F): 98.2 (08-06-24 @ 05:17), Max: 99 (08-05-24 @ 16:00)  HR: 81 (08-06-24 @ 05:17) (74 - 88)  BP: 135/81 (08-06-24 @ 05:17) (101/68 - 140/87)  RR: 18 (08-06-24 @ 05:17) (12 - 20)  SpO2: 100% (08-06-24 @ 05:17) (96% - 100%)  Wt(kg): --    Constitutional: NAD, A+O x3  Lungs: normal respirations   Abdomen: Soft, nondistended, no guarding or rebound tenderness.   Incision: Pfannenstiel incision clean, dry, intact. Abdominal binder in place.  : Spotting on pad  Extremities: No lower extremity edema or calf tenderness bilaterally; venodynes in place      LABS:  AM labs pending           I&O's Summary    05 Aug 2024 07:01  -  06 Aug 2024 07:00  --------------------------------------------------------  IN: 2505 mL / OUT: 2135 mL / NET: 370 mL        MEDICATIONS  (STANDING):  acetaminophen   IVPB .. 1000 milliGRAM(s) IV Intermittent once  heparin   Injectable 5000 Unit(s) SubCutaneous every 8 hours  ketorolac   Injectable 15 milliGRAM(s) IV Push every 6 hours  lactated ringers. 1000 milliLiter(s) (125 mL/Hr) IV Continuous <Continuous>  senna 2 Tablet(s) Oral at bedtime  simethicone 80 milliGRAM(s) Chew every 6 hours    MEDICATIONS  (PRN):  ondansetron Injectable 4 milliGRAM(s) IV Push every 4 hours PRN Nausea and/or Vomiting  oxyCODONE    IR 5 milliGRAM(s) Oral every 6 hours PRN Moderate Pain (4 - 6)  oxyCODONE    IR 10 milliGRAM(s) Oral every 6 hours PRN Severe Pain (7 - 10)       for 5 minutes and then increased to 2 micrograms finally Isuprel increased to 3 micrograms/min. The BP and heart rate measurements were as follows     Baseline   Blood Pressure: 145/75  Baseline Heart rate: 84     During tilt off Isuprel  Blood pressure: 152/92  Heart rate: 88     During tilt on Isuprel  Blood pressure: 179/76  Heart rate: 147           Conclusion:  1. Normal physiologic response. Assessment:     1. Chest pain - Suspect may be mostly secondary to anxiety and/or fibromyalgia or chest wall pain. She underwent invasive coronary angiography in May following a false positive nuclear SPECT stress test and was found to have normal coronary arteries. During her procedure there was some mild catheter-induced spasm of the ostial and proximal RCA at which time she reported chest pressure. She initially thought that this pain was different from the chest pain she had experienced in the past, but then later felt like she was having similar chest pain. She was started on verapamil and feels like her chest pain has improved since, and thus cannot rule out coronary vasospasm as a potential source of her pain either. Many of her symptoms seem to be exacerbated by anxiety and her response to verapamil could be due to placebo effect. 2. Recurrent syncope - Follows with EP. Again seems to be exacerbated by anxiety and history is suggestive of vasovagal reactions. Had ILR placed in the past which did not reveal any significant arrhythmias. Had normal physiologic response during recent tilt table test.  3. Anxiety  4. Fibromyalgia  5. Morbid obesity    Plan:     1. Continue verapamil 120 mg daily and other medications as previously prescribed. 2. Educated on counter-pressure maneuvers to help abort future vasovagal reactions.   3. Advised to patient to make sure she is maintaining adequate PO fluid intake and to always rise slowly from lying and sitting positions and to sit or lie down immediately whenever she begins to feel lightheaded. 4. Encouraged weight loss throughout dietary modifications and regular physical exercise. 5. Regular physical exercise may also be an effective means of helping to reduce her stress and anxiety. 6. She will continue to follow-up with EP and can follow-up with me in the future on an as needed basis. This note was scribed in the presence of Antione Pulido DO by Edwin Yu RN. It is a pleasure to assist in the care of Aguila Husbands. Please call with any questions. The scribes documentation has been prepared under my direction and personally reviewed by me in its entirety. I confirm that the note above accurately reflects all work, treatment, procedures, and medical decision making performed by me. I, Dr. Crystal Lara, personally performed the services described in this documentation as scribed by Edwin Yu RN in my presence, and it is both accurate and complete to the best of our ability.        Crystal Lara DO   Interventional Cardiologist  [address]  [phone] [facility]  [phone] [address] [place] [address]

## 2024-11-25 NOTE — TELEPHONE ENCOUNTER
Last ov 10/26/2023  NO UPCOMING OV   Mag lvl 04/2023    Attempted to contact regarding needing appt and lab work done unable to leave message phone keep ringing.     FRONT- pt needs appt and mag lvl done.

## 2024-11-27 RX ORDER — LANOLIN ALCOHOL/MO/W.PET/CERES
400 CREAM (GRAM) TOPICAL DAILY
Qty: 90 TABLET | Refills: 0 | OUTPATIENT
Start: 2024-11-27

## 2024-11-27 NOTE — TELEPHONE ENCOUNTER
11/27- called pt @[phone] [facility] informing pt to call back to schedule f/u appt w [facility] for continuation of med refills. Pt also needs lab work done per JOEY.

## 2024-11-27 NOTE — TELEPHONE ENCOUNTER
Pt returned call. She has moved to [place] and has a new cardiologist that farrukh recommended.